# Patient Record
Sex: FEMALE | Race: WHITE | NOT HISPANIC OR LATINO | Employment: PART TIME | URBAN - METROPOLITAN AREA
[De-identification: names, ages, dates, MRNs, and addresses within clinical notes are randomized per-mention and may not be internally consistent; named-entity substitution may affect disease eponyms.]

---

## 2017-02-23 ENCOUNTER — OFFICE VISIT (OUTPATIENT)
Dept: FAMILY MEDICINE | Facility: CLINIC | Age: 32
End: 2017-02-23
Payer: COMMERCIAL

## 2017-02-23 VITALS
HEIGHT: 63 IN | BODY MASS INDEX: 29.02 KG/M2 | HEART RATE: 98 BPM | WEIGHT: 163.8 LBS | SYSTOLIC BLOOD PRESSURE: 100 MMHG | DIASTOLIC BLOOD PRESSURE: 60 MMHG | TEMPERATURE: 98.1 F | OXYGEN SATURATION: 98 %

## 2017-02-23 DIAGNOSIS — Z30.42 DEPO-PROVERA CONTRACEPTIVE STATUS: Primary | ICD-10-CM

## 2017-02-23 PROCEDURE — 96372 THER/PROPH/DIAG INJ SC/IM: CPT | Performed by: NURSE PRACTITIONER

## 2017-02-23 PROCEDURE — 99202 OFFICE O/P NEW SF 15 MIN: CPT | Mod: 25 | Performed by: NURSE PRACTITIONER

## 2017-02-23 NOTE — NURSING NOTE
"Chief Complaint   Patient presents with     Imm/Inj     Depo        Initial /60 (BP Location: Right arm, Patient Position: Chair, Cuff Size: Adult Regular)  Pulse 98  Temp 98.1  F (36.7  C) (Oral)  Ht 5' 2.5\" (1.588 m)  Wt 163 lb 12.8 oz (74.3 kg)  SpO2 98%  Breastfeeding? No  BMI 29.48 kg/m2 Estimated body mass index is 29.48 kg/(m^2) as calculated from the following:    Height as of this encounter: 5' 2.5\" (1.588 m).    Weight as of this encounter: 163 lb 12.8 oz (74.3 kg).  Medication Reconciliation: complete   KENAN Arvizu      "

## 2017-02-23 NOTE — MR AVS SNAPSHOT
"              After Visit Summary   2017    Mary Lerma    MRN: [id number]           Patient Information     Date Of Birth          1985        Visit Information        Provider Department      2017 11:00 AM Elena Mcdonough NP [facility]        Today's Diagnoses     Depo-Provera contraceptive status    -  1       Follow-ups after your visit        Who to contact     If you have questions or need follow up information about today's clinic visit or your schedule please contact [facility] directly at [phone].  Normal or non-critical lab and imaging results will be communicated to you by PingMDhart, letter or phone within 4 business days after the clinic has received the results. If you do not hear from us within 7 days, please contact the clinic through ViaViewt or phone. If you have a critical or abnormal lab result, we will notify you by phone as soon as possible.  Submit refill requests through [employer] or call your pharmacy and they will forward the refill request to us. Please allow 3 business days for your refill to be completed.          Additional Information About Your Visit        MyChart Information     [employer] lets you send messages to your doctor, view your test results, renew your prescriptions, schedule appointments and more. To sign up, go to [email or web address] . Click on \"Log in\" on the left side of the screen, which will take you to the Welcome page. Then click on \"Sign up Now\" on the right side of the page.     You will be asked to enter the access code listed below, as well as some personal information. Please follow the directions to create your username and password.     Your access code is: [email or web address]  Expires: 2017  5:22 PM     Your access code will  in 90 days. If you need help or a new code, please call your [facility] or [phone].        Care EveryWhere ID     This is your Care EveryWhere ID. This could be used by " "other organizations to access your [facility] medical records  [id number]        Your Vitals Were     Pulse Temperature Height Pulse Oximetry Breastfeeding? BMI (Body Mass Index)    98 98.1  F (36.7  C) (Oral) 5' 2.5\" (1.588 m) 98% No 29.48 kg/m2       Blood Pressure from Last 3 Encounters:   02/23/17 100/60    Weight from Last 3 Encounters:   02/23/17 163 lb 12.8 oz (74.3 kg)              We Performed the Following     INJECTION INTRAMUSCULAR OR SUB-Q     Medroxyprogesterone inj  1mg   (Depo Provera J-Code)        Primary Care Provider Office Phone # Fax #    Elena Mcdonough [phone]       [facility] [address]  [facility] [address]        Thank you!     Thank you for choosing [facility]  for your care. Our goal is always to provide you with excellent care. Hearing back from our patients is one way we can continue to improve our services. Please take a few minutes to complete the written survey that you may receive in the mail after your visit with us. Thank you!             Your Updated Medication List - Protect others around you: Learn how to safely use, store and throw away your medicines at www.disposemymeds.org.          This list is accurate as of: 2/23/17  5:22 PM.  Always use your most recent med list.                   Brand Name Dispense Instructions for use    MEDROXYPROGESTERONE ACETATE PO            "

## 2017-02-23 NOTE — PROGRESS NOTES
"  SUBJECTIVE:                                                    Mary Lerma is a 31 year old female who presents to clinic today for the following health issues:      New Patient/Transfer of Care  Mary recently moved here from [place], [place] with her  and two children. 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 Has been on Depo for the past three years since the birth of her second child. Feels like it is a good fit for her as OCP she would forget to take and does not want \"anything inside of her\". Last injection was on December 7th, 2016. Due for another injection and would like to establish care at this clinic.            Problem list and histories reviewed & adjusted, as indicated.  Additional history: none    Problem list, Medication list, Allergies, and Medical/Social/Surgical histories reviewed in EPIC and updated as appropriate.    ROS:  Constitutional, HEENT, cardiovascular, pulmonary, gi and gu systems are negative, except as otherwise noted.    OBJECTIVE:                                                    /60 (BP Location: Right arm, Patient Position: Chair, Cuff Size: Adult Regular)  Pulse 98  Temp 98.1  F (36.7  C) (Oral)  Ht 5' 2.5\" (1.588 m)  Wt 163 lb 12.8 oz (74.3 kg)  SpO2 98%  Breastfeeding? No  BMI 29.48 kg/m2  Body mass index is 29.48 kg/(m^2).  GENERAL: healthy, alert and no distress  RESP: lungs clear to auscultation - no rales, rhonchi or wheezes  CV: regular rate and rhythm, normal S1 S2, no S3 or S4, no murmur, click or rub, no peripheral edema and peripheral pulses strong  PSYCH: mentation appears normal, affect normal/bright    none      ASSESSMENT/PLAN:                                                            1. Depo-Provera contraceptive status  Due for Depo and is within her window so given today.   - Medroxyprogesterone inj  1mg   (Depo Provera J-Code)  - INJECTION INTRAMUSCULAR OR SUB-Q    Recommended that she follow up for a complete physical in the near " future. I have explained that the clinic can provide excellent care for her entire family. She will schedule her children as well.     Elena Mcdonough NP  [facility]

## 2017-05-03 ENCOUNTER — TELEPHONE (OUTPATIENT)
Dept: FAMILY MEDICINE | Facility: CLINIC | Age: 32
End: 2017-05-03

## 2017-05-03 NOTE — TELEPHONE ENCOUNTER
Panel Management Review      Patient has the following on her problem list: None      Composite cancer screening  Chart review shows that this patient is due/due soon for the following Pap Smear  Summary:    Patient is due/failing the following:   PAP    Action needed:   Patient needs office visit for pap.    Type of outreach:    Phone, spoke to patient.  pt has a few appt. at other clinics and she wants to see how those go before she makes another appointment with us.    Questions for provider review:    None                                                                                                                                    KENAN Arvizu       Chart routed to  .

## 2017-05-04 ENCOUNTER — PRE VISIT (OUTPATIENT)
Dept: OTOLARYNGOLOGY | Facility: CLINIC | Age: 32
End: 2017-05-04

## 2017-05-04 NOTE — TELEPHONE ENCOUNTER
1.  Date/reason for appt: 6/26/17 11:30AM Bilateral Hearing Loss  2.  Referring provider: Self   3.  Call to patient (Yes / No - short description): Yes, call was connected from CC  4.  Previous care at / records requested from:  [facility] E[facility] - Emailed MARGUERITE to [email or web address]

## 2017-05-07 ENCOUNTER — APPOINTMENT (OUTPATIENT)
Dept: GENERAL RADIOLOGY | Facility: CLINIC | Age: 32
End: 2017-05-07
Attending: EMERGENCY MEDICINE
Payer: OTHER MISCELLANEOUS

## 2017-05-07 ENCOUNTER — HOSPITAL ENCOUNTER (EMERGENCY)
Facility: CLINIC | Age: 32
Discharge: HOME | End: 2017-05-07
Attending: EMERGENCY MEDICINE | Admitting: EMERGENCY MEDICINE
Payer: OTHER MISCELLANEOUS

## 2017-05-07 VITALS
DIASTOLIC BLOOD PRESSURE: 92 MMHG | BODY MASS INDEX: 28.8 KG/M2 | TEMPERATURE: 98.8 F | OXYGEN SATURATION: 100 % | SYSTOLIC BLOOD PRESSURE: 153 MMHG | HEART RATE: 96 BPM | RESPIRATION RATE: 16 BRPM | WEIGHT: 160 LBS

## 2017-05-07 DIAGNOSIS — S60.221A CONTUSION OF RIGHT HAND, INITIAL ENCOUNTER: ICD-10-CM

## 2017-05-07 PROCEDURE — 99283 EMERGENCY DEPT VISIT LOW MDM: CPT

## 2017-05-07 PROCEDURE — 73130 X-RAY EXAM OF HAND: CPT | Mod: RT

## 2017-05-07 NOTE — ED AVS SNAPSHOT
[facility] Emergency Department    [address]    [facility] [address]    Phone:  [phone]    Fax:  [phone]                                       Mary Lerma   MRN: [id number]    Department:  [facility] Emergency Department   Date of Visit:  5/7/2017           Patient Information     Date Of Birth          1985        Your diagnoses for this visit were:     Contusion of right hand, initial encounter        You were seen by Charly Thomas MD.      Follow-up Information     Follow up with Elena Mcdonough NP. Schedule an appointment as soon as possible for a visit in 1 week.    Specialty:  Nurse Practitioner - Family    Why:  As needed    Contact information:    [facility]  [address]  [facility] [address]  [phone]          Discharge Instructions         Hand Contusion  You have a contusion. This is also called a bruise. There is swelling and some bleeding under the skin, but no broken bones. This injury generally takes a few days to a few weeks to heal.  During that time, the bruise will typically change in color from reddish, to purple-blue, to greenish-yellow, then to yellow-brown.  Home care    Elevate the hand to reduce pain and swelling. As much as possible, sit or lie down with the hand raised about the level of your heart. This is especially important during the first 48 hours.    Ice the hand to help reduce pain and swelling. Wrap a cold source (ice pack or ice cubes in a plastic bag) in a thin towel. Apply to the bruised area for 20 minutes every 1 to 2 hours the first day. Continue this 3 to 4 times a day until the pain and swelling goes away.    Unless another medication was prescribed, you can take acetaminophen, ibuprofen, or naproxen to control pain. (If you have chronic liver or kidney disease or ever had a stomach ulcer or GI bleeding, talk with your doctor before using these medicines.)  Follow up  Follow up with  your health care provider or our staff as advised. Call if you are not improving within 1 to 2 weeks.  When to seek medical advice   Call your health care provider right away if you have any of the following:    Increased pain or swelling    Arm becomes cold, blue, numb or tingly    Signs of infection: Warmth, drainage, or increased redness or pain around the bruise    Inability to move the injured hand     Frequent bruising for unknown reasons    6221-8123 [employer]. [address] [place] [address]. All rights reserved. This information is not intended as a substitute for professional medical care. Always follow your healthcare professional's instructions.          Future Appointments        Provider Department Dept Phone Center    6/26/2017 10:30 AM Kate Valverde [facility] Audiology [phone] [facility]    6/26/2017 11:30 AM MD KARL Espinosa [facility] Ear Nose and Throat [phone] [facility]      24 Hour Appointment Hotline       To make an appointment at any [facility] call [phone] ([phone]). If you don't have a family doctor or clinic, we will help you find one. [facility] are conveniently located to serve the needs of you and your family.             Review of your medicines      Our records show that you are taking the medicines listed below. If these are incorrect, please call your family doctor or clinic.        Dose / Directions Last dose taken    MEDROXYPROGESTERONE ACETATE PO        Refills:  0                Procedures and tests performed during your visit     Hand XR, G/E 3 views, right      Orders Needing Specimen Collection     None      Pending Results     No orders found from 5/5/2017 to 5/8/2017.            Pending Culture Results     No orders found from 5/5/2017 to 5/8/2017.            Pending Results Instructions     If you had any lab results that were not finalized at the time of your Discharge, you can call the ED Lab Result RN at  [phone]. You will be contacted by this team for any positive Lab results or changes in treatment. The nurses are available 7 days a week from 10A to 6:30P.  You can leave a message 24 hours per day and they will return your call.        Test Results From Your Hospital Stay        5/7/2017 10:14 PM      Narrative     HAND THREE  VIEWS RIGHT 5/7/2017 9:49 PM     HISTORY: Blunt trauma to 3rd metacarpal.    COMPARISON: None.    FINDINGS: There is no significant degenerative change. There is no  acute fracture or dislocation. There are no worrisome bony lesions.        Impression     IMPRESSION:  No acute osseous abnormality demonstrated.    OLIVIA AWAN MD                Clinical Quality Measure: Blood Pressure Screening     Your blood pressure was checked while you were in the emergency department today. The last reading we obtained was  BP: (!) 153/92 . Please read the guidelines below about what these numbers mean and what you should do about them.  If your systolic blood pressure (the top number) is less than 120 and your diastolic blood pressure (the bottom number) is less than 80, then your blood pressure is normal. There is nothing more that you need to do about it.  If your systolic blood pressure (the top number) is 120-139 or your diastolic blood pressure (the bottom number) is 80-89, your blood pressure may be higher than it should be. You should have your blood pressure rechecked within a year by a primary care provider.  If your systolic blood pressure (the top number) is 140 or greater or your diastolic blood pressure (the bottom number) is 90 or greater, you may have high blood pressure. High blood pressure is treatable, but if left untreated over time it can put you at risk for heart attack, stroke, or kidney failure. You should have your blood pressure rechecked by a primary care provider within the next 4 weeks.  If your provider in the emergency department today gave you specific instructions to  "follow-up with your doctor or provider even sooner than that, you should follow that instruction and not wait for up to 4 weeks for your follow-up visit.        Thank you for choosing [facility]       Thank you for choosing [facility] for your care. Our goal is always to provide you with excellent care. Hearing back from our patients is one way we can continue to improve our services. Please take a few minutes to complete the written survey that you may receive in the mail after you visit with us. Thank you!        Cartilixhart Information     [employer] lets you send messages to your doctor, view your test results, renew your prescriptions, schedule appointments and more. To sign up, go to [email or web address] . Click on \"Log in\" on the left side of the screen, which will take you to the Welcome page. Then click on \"Sign up Now\" on the right side of the page.     You will be asked to enter the access code listed below, as well as some personal information. Please follow the directions to create your username and password.     Your access code is: [email or web address]  Expires: 2017  6:22 PM     Your access code will  in 90 days. If you need help or a new code, please call your [facility] clinic or [phone].        Care EveryWhere ID     This is your Care EveryWhere ID. This could be used by other organizations to access your [facility] medical records  [id number]        After Visit Summary       This is your record. Keep this with you and show to your community pharmacist(s) and doctor(s) at your next visit.                  "

## 2017-05-07 NOTE — ED AVS SNAPSHOT
[facility] Emergency Department    [address]    [facility] [address]    Phone:  [phone]    Fax:  [phone]                                       Mary Lerma   MRN: [id number]    Department:  [facility] Emergency Department   Date of Visit:  5/7/2017           After Visit Summary Signature Page     I have received my discharge instructions, and my questions have been answered. I have discussed any challenges I see with this plan with the nurse or doctor.    ..........................................................................................................................................  Patient/Patient Representative Signature      ..........................................................................................................................................  Patient Representative Print Name and Relationship to Patient    ..................................................               ................................................  Date                                            Time    ..........................................................................................................................................  Reviewed by Signature/Title    ...................................................              ..............................................  Date                                                            Time

## 2017-05-08 NOTE — ED PROVIDER NOTES
CHIEF COMPLAINT:  A 31-year-old female with right hand pain.      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  She was transferring a patient when she struck her hand between another cart.  She had immediate onset of pain over the dorsal aspect of the third metacarpal.  The pain is constant, nonradiating and relatively mild at this time.  She denies any other traumatic injury.  No associated numbness or weakness.      ALLERGIES:  Strattera.       HOME MEDICATIONS:  Medroxyprogesterone acetate.      PAST MEDICAL HISTORY:  None.      SOCIAL HISTORY:  The patient is a former tobacco user.      REVIEW OF SYSTEMS:    MUSCULOSKELETAL:  Positive for right hand pain.   NEUROLOGIC:  Negative for numbness or weakness.   SKIN:  No new rash.      PHYSICAL EXAMINATION:   VITAL SIGNS:  Blood pressure 153/92, pulse 96, respirations 16, O2 saturation 100% on room air, temperature 98.8.     General:   Pleasant, age appropriate.  EYES:   Conjunctiva normal.  NECK:    Supple, no meningismus.   CV:     2+ radial pulses bilateral.  MSK:     Right upper extremity:      No pain with palpation or ROM of the wrist.      No scaphoid tenderness.      Tenderness to proximal 3rd metacarpal       Mild overlying edema.       No overlying laceration or tenting of the skin.  NEURO:   Right upper extremity:      Median, radial and ulnar nerve intact to motor and sensation.  SKIN:    Warm, dry and intact.    PSYCH:    Mood is good and affect is appropriate.         RADIOGRAPHIC STUDIES:  X-ray right hand, no acute osseous abnormality demonstrated.      MEDICAL DECISION MAKING:  A 31-year-old female seen in the ED with acute traumatic right hand pain.  Her pain is isolated to the third metacarpal with no tenderness over the scaphoid.  Plain films are unremarkable.  Findings are consistent with soft tissue contusion.  The patient was given a cock-up wrist  splint for comfort.  Ibuprofen and Tylenol as needed for pain.      DIAGNOSIS:  Right hand contusion.         EASTON ALICIA MD             D: 2017 23:28   T: 2017 11:12   MT: [id number]      Name:     CHELA WOMACK   MRN:      [id number]        Account:      [id number]   :      1985           Visit Date:   2017      Document: [id number]

## 2017-05-08 NOTE — DISCHARGE INSTRUCTIONS
Hand Contusion  You have a contusion. This is also called a bruise. There is swelling and some bleeding under the skin, but no broken bones. This injury generally takes a few days to a few weeks to heal.  During that time, the bruise will typically change in color from reddish, to purple-blue, to greenish-yellow, then to yellow-brown.  Home care    Elevate the hand to reduce pain and swelling. As much as possible, sit or lie down with the hand raised about the level of your heart. This is especially important during the first 48 hours.    Ice the hand to help reduce pain and swelling. Wrap a cold source (ice pack or ice cubes in a plastic bag) in a thin towel. Apply to the bruised area for 20 minutes every 1 to 2 hours the first day. Continue this 3 to 4 times a day until the pain and swelling goes away.    Unless another medication was prescribed, you can take acetaminophen, ibuprofen, or naproxen to control pain. (If you have chronic liver or kidney disease or ever had a stomach ulcer or GI bleeding, talk with your doctor before using these medicines.)  Follow up  Follow up with your health care provider or our staff as advised. Call if you are not improving within 1 to 2 weeks.  When to seek medical advice   Call your health care provider right away if you have any of the following:    Increased pain or swelling    Arm becomes cold, blue, numb or tingly    Signs of infection: Warmth, drainage, or increased redness or pain around the bruise    Inability to move the injured hand     Frequent bruising for unknown reasons    5663-9845 [employer]. [address] [place] [address], [place] [address]. All rights reserved. This information is not intended as a substitute for professional medical care. Always follow your healthcare professional's instructions.

## 2017-05-08 NOTE — ED NOTES
Pt injured right hand while at work, states hand was caught and smashed in metal linen cart, swelling noted to hand. ABC's intact, alert and oriented X3.

## 2017-05-11 ENCOUNTER — OFFICE VISIT (OUTPATIENT)
Dept: URGENT CARE | Facility: URGENT CARE | Age: 32
End: 2017-05-11
Payer: COMMERCIAL

## 2017-05-11 ENCOUNTER — RADIANT APPOINTMENT (OUTPATIENT)
Dept: GENERAL RADIOLOGY | Facility: CLINIC | Age: 32
End: 2017-05-11
Attending: FAMILY MEDICINE
Payer: OTHER MISCELLANEOUS

## 2017-05-11 VITALS
DIASTOLIC BLOOD PRESSURE: 60 MMHG | HEART RATE: 87 BPM | TEMPERATURE: 98.4 F | SYSTOLIC BLOOD PRESSURE: 110 MMHG | OXYGEN SATURATION: 100 %

## 2017-05-11 DIAGNOSIS — M54.6 PAIN IN THORACIC SPINE: ICD-10-CM

## 2017-05-11 DIAGNOSIS — M54.6 PAIN IN THORACIC SPINE: Primary | ICD-10-CM

## 2017-05-11 PROCEDURE — 99214 OFFICE O/P EST MOD 30 MIN: CPT | Performed by: FAMILY MEDICINE

## 2017-05-11 PROCEDURE — 72072 X-RAY EXAM THORAC SPINE 3VWS: CPT

## 2017-05-11 RX ORDER — CYCLOBENZAPRINE HCL 5 MG
5 TABLET ORAL 3 TIMES DAILY PRN
Qty: 30 TABLET | Refills: 0 | Status: SHIPPED | OUTPATIENT
Start: 2017-05-11 | End: 2017-07-06

## 2017-05-11 RX ORDER — TRAMADOL HYDROCHLORIDE 50 MG/1
50-100 TABLET ORAL EVERY 6 HOURS PRN
Qty: 12 TABLET | Refills: 0 | Status: SHIPPED | OUTPATIENT
Start: 2017-05-11 | End: 2017-05-26

## 2017-05-11 NOTE — NURSING NOTE
"Chief Complaint   Patient presents with     Urgent Care     Musculoskeletal Problem       Initial /60 (BP Location: Right arm, Patient Position: Chair, Cuff Size: Adult Regular)  Pulse 87  Temp 98.4  F (36.9  C) (Oral)  SpO2 100% Estimated body mass index is 28.8 kg/(m^2) as calculated from the following:    Height as of 2/23/17: 5' 2.5\" (1.588 m).    Weight as of 5/7/17: 160 lb (72.6 kg).  Medication Reconciliation: complete     Diana Del Castillo CMA (AAMA)        "

## 2017-05-11 NOTE — PROGRESS NOTES
SUBJECTIVE:                                                    Mary Lerma is a 31 year old female who presents to clinic today for the following health issues:      Back Pain      Duration: x1 day        Specific cause: fall, slipped on dog leash and fell down stairs last night     Description:   Location of pain: low back left and upper back middle area  Character of pain: sharp and constant  Pain radiation:none  New numbness or weakness in legs, not attributed to pain:  no     Intensity: Currently 9/10, worst when moving and breathing    History:   Pain interferes with job: YES  History of back problems: recurrent self limited episodes of low back pain in the past  Any previous MRI or X-rays: None  Sees a specialist for back pain:  No  Therapies tried without relief: Heat and Ice, tylenol     Alleviating factors:   Improved by: nothing helps      Precipitating factors:  Worsened by: activities and breathing    Functional and Psychosocial Screen (Dinorah STarT Back):      Not performed today       Accompanying Signs & Symptoms:  Risk of Fracture:  Recent history of trauma or blunt force  Risk of Cauda Equina:  None  Risk of Infection:  None  Risk of Cancer:  Unknown   Risk of Ankylosing Spondylitis:  Onset at age <35, male, AND morning back stiffness. no          ROS:  C: NEGATIVE for fever, chills, change in weight  INTEGUMENTARY/SKIN: NEGATIVE for worrisome rashes, moles or lesions and NEGATIVE for bruising on back   R: NEGATIVE for significant cough or SOB  CV: NEGATIVE for chest pain, palpitations or peripheral edema  MUSCULOSKELETAL: POSITIVE  for back pain thoracic, NEGATIVE for other muscle pain   NEURO: NEGATIVE for weakness, dizziness or paresthesias and NEGATIVE for dizziness/lightheadedness, loss of consciousness, numbness or tingling, visual change and bowel or bladder incontinence     OBJECTIVE:                                                    /60 (BP Location: Right arm, Patient Position:  Chair, Cuff Size: Adult Regular)  Pulse 87  Temp 98.4  F (36.9  C) (Oral)  SpO2 100%  There is no height or weight on file to calculate BMI.  GENERAL: healthy, alert and moderate distress  MS: decreased range of motion secondary to pain, no edema, tenderness to palpation over thoracic spine  SKIN: no suspicious lesions or rashes  NEURO: Normal strength and tone, mentation intact and sensation grossly intact     Diagnostic Test Results:  Xray - negative      ASSESSMENT/PLAN:                                                          ICD-10-CM    1. Pain in thoracic spine M54.6 XR Thoracic Spine 3 Views     cyclobenzaprine (FLEXERIL) 5 MG tablet     traMADol (ULTRAM) 50 MG tablet       Plan:   1) will treat muscle pain with above medications  2) follow up next week to check on improvement in symptoms     25 minutes Spent inexam and counseling. 50% of time in counseling in regards to back pain, selection of medication and follow-up    Luis Hardin MD  [facility]

## 2017-05-12 ENCOUNTER — TELEPHONE (OUTPATIENT)
Dept: NURSING | Facility: CLINIC | Age: 32
End: 2017-05-12

## 2017-05-13 NOTE — TELEPHONE ENCOUNTER
"Call Type: Triage Call    Presenting Problem: \"I was in for back injury, and my Tramadol isn't  working at all. Pain 8-9/10.\" Requesting stromger pain meds.  Triage Note:  Guideline Title: Back Symptoms ; Back Symptoms  Recommended Disposition: See Provider within 24 hours  Original Inclination: Wanted to speak with a nurse  Override Disposition: See ED Immediately  Intended Action: Patient does not know  Physician Contacted: No  Pain intensifies with coughing, sneezing or straining ?  YES  New or worsening signs and symptoms that may indicate shock ? NO  Pregnant,  less than 20 weeks gestation ? NO  Severe back pain AND history of IV drug use, alcoholism, or previous spinal trauma  ? NO  Pregnant and gestation greater than 37 weeks AND low backache/pain that is  constant or increasing in intensity ? NO  Pregnant and gestation 20-37 weeks AND low backache/pain that is constant or  increasing in intensity ? NO  Following significant trauma or injury to neck or back AND immobile since event ?  NO  Pregnant, back symptoms AND no signs of labor ? NO  Burning or tingling feeling, itchiness or stabbing pain in a localized area on one  side of body followed by reddened fluid-filled blisters that break and crust ? NO  Back pain associated with any breathing symptoms (such as noisy breathing,  struggling to breathe, sudden change in respiratory rate) ? NO  New paralysis (unable to move); new weakness (not due to pain); new loss of  coordination (purposeful action) OR new unexplained numbness/tingling involving  arm and leg on same side of body ? NO  Following significant trauma AND has been mobile since injury but is now having  back or neck pain ? NO  Age 50 years or older with sudden onset of deep boring or tearing pain in back OR  abdomen; may radiate to groin, hips or lower extremities ? NO  Pain predominately in flank area ? NO  Urinary tract symptoms are primary symptoms ? NO  Urinary tract symptoms are primary " symptoms ? NO  New onset back pain associated with numbness or weakness in both legs ? NO  Fever of 100.5 F (38.1 C) or higher associated with back symptoms ? NO  Low back pain AND urinary tract symptoms ? NO  Back pain radiates into thigh or below knee ? NO  New onset or unexplained change in bowel or bladder control (unable to urinate and  full feeling or loss of control of bowel or bladder) ? NO  Numbness in the groin and saddle area of pelvis AND lower extremity weakness OR  change in bowel or bladder control (loss of control, retention, overflow) ? NO  Painful spasms or cramping of large muscle groups (back, legs or abdomen) AND  recent heat exposure ? NO  Any other cardiac signs/symptoms for more than 5 minutes, now or within last hour.  Pain is NOT associated with taking a deep breath or a productive cough, movement,  or touch to a localized area on the chest or upper body. ? NO  Pain predominately in the neck ? NO  New onset of severe disabling back pain (unable to stand upright; pain wakens you  from sleep; constant or intense pain when lying down) ? NO  Any temperature elevation in an immunocompromised individual OR frail elderly with  signs of dehydration ? NO  Physician Instructions:  Care Advice: Call provider if symptoms worsen or new symptoms develop.  Go to the ED if you have worsening pain, numbness or weakness of arms or  legs, cannot walk, or have new unexplained changes in bladder or bowel  function. Another adult should drive.

## 2017-05-17 ENCOUNTER — OFFICE VISIT (OUTPATIENT)
Dept: FAMILY MEDICINE | Facility: CLINIC | Age: 32
End: 2017-05-17
Payer: COMMERCIAL

## 2017-05-17 VITALS
HEART RATE: 91 BPM | OXYGEN SATURATION: 98 % | TEMPERATURE: 99 F | BODY MASS INDEX: 29.48 KG/M2 | WEIGHT: 166.4 LBS | DIASTOLIC BLOOD PRESSURE: 60 MMHG | HEIGHT: 63 IN | SYSTOLIC BLOOD PRESSURE: 110 MMHG

## 2017-05-17 DIAGNOSIS — Z98.84 BARIATRIC SURGERY STATUS: ICD-10-CM

## 2017-05-17 DIAGNOSIS — R50.9 FEVER, UNSPECIFIED: Primary | ICD-10-CM

## 2017-05-17 DIAGNOSIS — Z30.013 ENCOUNTER FOR INITIAL PRESCRIPTION OF INJECTABLE CONTRACEPTIVE: ICD-10-CM

## 2017-05-17 DIAGNOSIS — M54.6 ACUTE THORACIC BACK PAIN, UNSPECIFIED BACK PAIN LATERALITY: ICD-10-CM

## 2017-05-17 DIAGNOSIS — Z12.4 SCREENING FOR MALIGNANT NEOPLASM OF CERVIX: ICD-10-CM

## 2017-05-17 DIAGNOSIS — Z23 NEED FOR PROPHYLACTIC VACCINATION WITH TETANUS-DIPHTHERIA (TD): ICD-10-CM

## 2017-05-17 DIAGNOSIS — Z00.00 ENCOUNTER FOR ROUTINE ADULT HEALTH EXAMINATION WITHOUT ABNORMAL FINDINGS: ICD-10-CM

## 2017-05-17 DIAGNOSIS — Z23 ENCOUNTER FOR IMMUNIZATION: ICD-10-CM

## 2017-05-17 PROBLEM — E66.3 OVERWEIGHT: Status: ACTIVE | Noted: 2017-05-17

## 2017-05-17 LAB
DEPRECATED S PYO AG THROAT QL EIA: NORMAL
ERYTHROCYTE [DISTWIDTH] IN BLOOD BY AUTOMATED COUNT: 13 % (ref 10–15)
HCT VFR BLD AUTO: 43.2 % (ref 35–47)
HGB BLD-MCNC: 14.6 G/DL (ref 11.7–15.7)
MCH RBC QN AUTO: 30.2 PG (ref 26.5–33)
MCHC RBC AUTO-ENTMCNC: 33.8 G/DL (ref 31.5–36.5)
MCV RBC AUTO: 89 FL (ref 78–100)
MICRO REPORT STATUS: NORMAL
PLATELET # BLD AUTO: 254 10E9/L (ref 150–450)
RBC # BLD AUTO: 4.83 10E12/L (ref 3.8–5.2)
SPECIMEN SOURCE: NORMAL
VIT B12 SERPL-MCNC: 427 PG/ML (ref 193–986)
WBC # BLD AUTO: 8.4 10E9/L (ref 4–11)

## 2017-05-17 PROCEDURE — 87081 CULTURE SCREEN ONLY: CPT | Performed by: NURSE PRACTITIONER

## 2017-05-17 PROCEDURE — 82728 ASSAY OF FERRITIN: CPT | Performed by: NURSE PRACTITIONER

## 2017-05-17 PROCEDURE — 82607 VITAMIN B-12: CPT | Performed by: NURSE PRACTITIONER

## 2017-05-17 PROCEDURE — 80053 COMPREHEN METABOLIC PANEL: CPT | Performed by: NURSE PRACTITIONER

## 2017-05-17 PROCEDURE — G0145 SCR C/V CYTO,THINLAYER,RESCR: HCPCS | Performed by: NURSE PRACTITIONER

## 2017-05-17 PROCEDURE — 80061 LIPID PANEL: CPT | Performed by: NURSE PRACTITIONER

## 2017-05-17 PROCEDURE — 99395 PREV VISIT EST AGE 18-39: CPT | Mod: 25 | Performed by: NURSE PRACTITIONER

## 2017-05-17 PROCEDURE — 87880 STREP A ASSAY W/OPTIC: CPT | Performed by: NURSE PRACTITIONER

## 2017-05-17 PROCEDURE — 85027 COMPLETE CBC AUTOMATED: CPT | Performed by: NURSE PRACTITIONER

## 2017-05-17 PROCEDURE — 90471 IMMUNIZATION ADMIN: CPT | Performed by: NURSE PRACTITIONER

## 2017-05-17 PROCEDURE — 96372 THER/PROPH/DIAG INJ SC/IM: CPT | Performed by: NURSE PRACTITIONER

## 2017-05-17 PROCEDURE — 90715 TDAP VACCINE 7 YRS/> IM: CPT | Performed by: NURSE PRACTITIONER

## 2017-05-17 PROCEDURE — 99213 OFFICE O/P EST LOW 20 MIN: CPT | Mod: 25 | Performed by: NURSE PRACTITIONER

## 2017-05-17 PROCEDURE — 36415 COLL VENOUS BLD VENIPUNCTURE: CPT | Performed by: NURSE PRACTITIONER

## 2017-05-17 RX ORDER — HYDROCODONE BITARTRATE AND ACETAMINOPHEN 5; 325 MG/1; MG/1
1 TABLET ORAL EVERY 4 HOURS PRN
Qty: 10 TABLET | Refills: 0 | Status: SHIPPED | OUTPATIENT
Start: 2017-05-17 | End: 2017-05-26

## 2017-05-17 RX ORDER — MEDROXYPROGESTERONE ACETATE 150 MG/ML
150 INJECTION, SUSPENSION INTRAMUSCULAR
Qty: 1 ML | Refills: 3 | OUTPATIENT
Start: 2017-05-17

## 2017-05-17 NOTE — NURSING NOTE
"Chief Complaint   Patient presents with     Physical       Initial /60 (BP Location: Right arm, Patient Position: Chair, Cuff Size: Adult Regular)  Pulse 91  Temp 99  F (37.2  C) (Oral)  Ht 5' 2.5\" (1.588 m)  Wt 166 lb 6.4 oz (75.5 kg)  SpO2 98%  Breastfeeding? No  BMI 29.95 kg/m2 Estimated body mass index is 29.95 kg/(m^2) as calculated from the following:    Height as of this encounter: 5' 2.5\" (1.588 m).    Weight as of this encounter: 166 lb 6.4 oz (75.5 kg).  Medication Reconciliation: complete   KENAN Arvizu      "

## 2017-05-17 NOTE — PROGRESS NOTES
SUBJECTIVE:     CC: Mary Lerma is an 31 year old woman who presents for preventive health visit.     Healthy Habits:    Do you get at least three servings of calcium containing foods daily (dairy, green leafy vegetables, etc.)? yes    Amount of exercise or daily activities, outside of work: walks 30,000 a day at work     Problems taking medications regularly No    Medication side effects: No    Have you had an eye exam in the past two years? no    Do you see a dentist twice per year? no  Do you have sleep apnea, excessive snoring or daytime drowsiness?no, but feels fatigued a lot       Patient is here for complete physical exam including a Pap.  with 2 children. Currently using Depo-Provera for birth control and feels it's a good fit based on her lifestyle. History includes gastric bypass surgery and is interested in having labs drawn today. Patient relocated from [place] and has been without health insurance for some time.    In addition, has concerns about possible strep throat. Sore throat for the past 2 days. Exposure at work since working at [employer]    Complaints of back pain after recent fall while at home. Patient was seen in the urgent care by Dr. Hamlin's are given Ultram and Flexeril. Patient is still concerned about level of muscular soreness. Patient does not feel that after one week she should be sore.      Today's PHQ-2 Score:   PHQ-2 ( 1999 Pfizer) 2/23/2017   Q1: Little interest or pleasure in doing things 0   Q2: Feeling down, depressed or hopeless 0   PHQ-2 Score 0       Abuse: Current or Past(Physical, Sexual or Emotional)- No  Do you feel safe in your environment - Yes    Social History   Substance Use Topics     Smoking status: Former Smoker     Types: Cigarettes     Quit date: 2/9/2017     Smokeless tobacco: Never Used     Alcohol use No     The patient does not drink >3 drinks per day nor >7 drinks per week.    No results for input(s): CHOL, HDL, LDL, TRIG,  "KRIS DOMINGUEZ in the last [id number] hours.    Reviewed orders with patient.  Reviewed health maintenance and updated orders accordingly - Yes    Mammo Decision Support:  Mammogram not appropriate for this patient based on age.    Pertinent mammograms are reviewed under the imaging tab.  History of abnormal Pap smear: NO - age 21-29 PAP every 3 years recommended    Reviewed and updated as needed this visit by clinical staff  Tobacco  Allergies  Meds  Problems  Med Hx  Surg Hx  Fam Hx  Soc Hx          Reviewed and updated as needed this visit by Provider  Allergies  Meds  Problems            ROS:  C: NEGATIVE for fever, chills, change in weight  I: NEGATIVE for worrisome rashes, moles or lesions  E: NEGATIVE for vision changes or irritation  ENT: NEGATIVE for ear, mouth and throat problems  R: NEGATIVE for significant cough or SOB  B: NEGATIVE for masses, tenderness or discharge  CV: NEGATIVE for chest pain, palpitations or peripheral edema  GI: NEGATIVE for nausea, abdominal pain, heartburn, or change in bowel habits  : NEGATIVE for unusual urinary or vaginal symptoms. Periods are regular.  M: NEGATIVE for significant arthralgias or myalgia  N: NEGATIVE for weakness, dizziness or paresthesias  P: NEGATIVE for changes in mood or affect    Problem list, Medication list, Allergies, and Medical/Social/Surgical histories reviewed in [facility] and updated as appropriate.  OBJECTIVE:     /60 (BP Location: Right arm, Patient Position: Chair, Cuff Size: Adult Regular)  Pulse 91  Temp 99  F (37.2  C) (Oral)  Ht 5' 2.5\" (1.588 m)  Wt 166 lb 6.4 oz (75.5 kg)  SpO2 98%  Breastfeeding? No  BMI 29.95 kg/m2  EXAM:  GENERAL: healthy, alert and over weight  EYES: Eyes grossly normal to inspection, PERRL and conjunctivae and sclerae normal  HENT: ear canals and TM's normal, nose and mouth without ulcers or lesions  NECK: no adenopathy, no asymmetry, masses, or scars and thyroid normal to palpation  RESP: lungs " clear to auscultation - no rales, rhonchi or wheezes  BREAST: normal without masses, tenderness or nipple discharge and no palpable axillary masses or adenopathy  CV: regular rate and rhythm, normal S1 S2, no S3 or S4, no murmur, click or rub, no peripheral edema and peripheral pulses strong  ABDOMEN: soft, nontender, no hepatosplenomegaly, no masses and bowel sounds normal   (male): normal male genitalia without lesions or urethral discharge, no hernia  SKIN: Multiple tattoos present,  PSYCH: mentation appears normal, affect normal/bright    ASSESSMENT/PLAN:     1. Encounter for routine adult health examination without abnormal findings  Clinical breast exam is within normal limits. Fasting labs drawn today.  - Comprehensive metabolic panel  - Lipid panel reflex to direct LDL    2. Screening for malignant neoplasm of cervix   Pap collected and Chlamydia swab collected since sexually active.   - Pap imaged thin layer screen reflex to HPV if ASCUS - recommend age 25 - 29    3. Need for prophylactic vaccination with tetanus-diphtheria (TD)   TDap given today.     4. Fever, unspecified   Rapid strep is negative. Encourage fluids and rest.   - Strep, Rapid Screen  - Beta strep group A culture    5. Bariatric surgery status   History of bariatric surgery including gastric bypass. No recent labs drawn. CBC, ferritin and vitamin B12 levels collected today.  - Lipid panel reflex to direct LDL  - CBC with platelets  - Ferritin  - Vitamin B12    6. Encounter for initial prescription of injectable contraceptive   Depo-Provera injection given today and ordered for 1 year  - medroxyPROGESTERone (DEPO-PROVERA) 150 MG/ML injection; Inject 1 mL (150 mg) into the muscle every 3 months  Dispense: 1 mL; Refill: 3    7. Acute thoracic back pain, unspecified back pain laterality   Reviewed with patient treatment for ongoing back pain including ibuprofen. Offered tramadol patient declined. Emphasis on good body mechanics, weight loss  "and regular exercise routine to prevent further complications. No refills on narcotics.   - HYDROcodone-acetaminophen (NORCO) 5-325 MG per tablet; Take 1 tablet by mouth every 4 hours as needed for pain maximum 2 tablet(s) per day  Dispense: 10 tablet; Refill: 0    COUNSELING:   Reviewed preventive health counseling, as reflected in patient instructions       Regular exercise       Healthy diet/nutrition       Contraception         reports that she quit smoking about 3 months ago. Her smoking use included Cigarettes. She has never used smokeless tobacco.    Estimated body mass index is 29.95 kg/(m^2) as calculated from the following:    Height as of this encounter: 5' 2.5\" (1.588 m).    Weight as of this encounter: 166 lb 6.4 oz (75.5 kg).   Weight management plan: Discussed healthy diet and exercise guidelines and patient will follow up in 12 months in clinic to re-evaluate.    Counseling Resources:  ATP IV Guidelines  Pooled Cohorts Equation Calculator  Breast Cancer Risk Calculator  FRAX Risk Assessment  ICSI Preventive Guidelines  Dietary Guidelines for Americans, 2010  USDA's MyPlate  ASA Prophylaxis  Lung CA Screening    Elena Mcdonough NP  [facility]  "

## 2017-05-17 NOTE — MR AVS SNAPSHOT
After Visit Summary   5/17/2017    Mary Lerma    MRN: [id number]           Patient Information     Date Of Birth          1985        Visit Information        Provider Department      5/17/2017 11:00 AM Elena Mcdonough NP [facility]        Today's Diagnoses     Fever, unspecified    -  1    Screening for malignant neoplasm of cervix        Need for prophylactic vaccination with tetanus-diphtheria (TD)        Encounter for routine adult health examination without abnormal findings        Bariatric surgery status        Encounter for initial prescription of injectable contraceptive        Acute thoracic back pain, unspecified back pain laterality          Care Instructions      Preventive Health Recommendations  Female Ages 26 - 39  Yearly exam:   See your health care provider every year in order to    Review health changes.     Discuss preventive care.      Review your medicines if you your doctor has prescribed any.    Until age 30: Get a Pap test every three years (more often if you have had an abnormal result).    After age 30: Talk to your doctor about whether you should have a Pap test every 3 years or have a Pap test with HPV screening every 5 years.   You do not need a Pap test if your uterus was removed (hysterectomy) and you have not had cancer.  You should be tested each year for STDs (sexually transmitted diseases), if you're at risk.   Talk to your provider about how often to have your cholesterol checked.  If you are at risk for diabetes, you should have a diabetes test (fasting glucose).  Shots: Get a flu shot each year. Get a tetanus shot every 10 years.   Nutrition:     Eat at least 5 servings of fruits and vegetables each day.    Eat whole-grain bread, whole-wheat pasta and brown rice instead of white grains and rice.    Talk to your provider about Calcium and Vitamin D.     Lifestyle    Exercise at least 150 minutes a week (30 minutes a day, 5 days of the  "week). This will help you control your weight and prevent disease.    Limit alcohol to one drink per day.    No smoking.     Wear sunscreen to prevent skin cancer.    See your dentist every six months for an exam and cleaning.          Follow-ups after your visit        Your next 10 appointments already scheduled     Jun 26, 2017 10:30 AM CDT   Walk In From ENT with Kate Mitchell [facility] ([facility])    [address] [place] [id number] [phone]            Jun 26, 2017 11:30 AM CDT   (Arrive by 11:15 AM)   New Patient Visit with MD KARL Kaplan [facility]ose [facility] ([facility])    [address] [place] [id number] [phone]              Who to contact     If you have questions or need follow up information about today's clinic visit or your schedule please contact [facility] directly at [phone].  Normal or non-critical lab and imaging results will be communicated to you by [employer], letter or phone within 4 business days after the clinic has received the results. If you do not hear from us within 7 days, please contact the clinic through [employer]t or phone. If you have a critical or abnormal lab result, we will notify you by phone as soon as possible.  Submit refill requests through Testt or call your pharmacy and they will forward the refill request to us. Please allow 3 business days for your refill to be completed.          Additional Information About Your Visit        Testt Information     Testt lets you send messages to your doctor, view your test results, renew your prescriptions, schedule appointments and more. To sign up, go to [email or web address] . Click on \"Log in\" on the left side of the screen, which will take you to the Welcome page. Then click on \"Sign up Now\" on the right side of the page.     You will be asked to enter the access " "code listed below, as well as some personal information. Please follow the directions to create your username and password.     Your access code is: [email or web address]  Expires: 2017  6:22 PM     Your access code will  in 90 days. If you need help or a new code, please call your [facility] clinic or [phone].        Care EveryWhere ID     This is your Care EveryWhere ID. This could be used by other organizations to access your [facility] medical records  [id number]        Your Vitals Were     Pulse Temperature Height Pulse Oximetry Breastfeeding? BMI (Body Mass Index)    91 99  F (37.2  C) (Oral) 5' 2.5\" (1.588 m) 98% No 29.95 kg/m2       Blood Pressure from Last 3 Encounters:   17 110/60   17 110/60   17 (!) 153/92    Weight from Last 3 Encounters:   17 166 lb 6.4 oz (75.5 kg)   17 160 lb (72.6 kg)   17 163 lb 12.8 oz (74.3 kg)              We Performed the Following     Beta strep group A culture     CBC with platelets     Comprehensive metabolic panel     Ferritin     Lipid panel reflex to direct LDL     Pap imaged thin layer screen reflex to HPV if ASCUS - recommend age 25 - 29     Strep, Rapid Screen     Vitamin B12          Today's Medication Changes          These changes are accurate as of: 17 11:43 AM.  If you have any questions, ask your nurse or doctor.               Start taking these medicines.        Dose/Directions    HYDROcodone-acetaminophen 5-325 MG per tablet   Commonly known as:  NORCO   Used for:  Acute thoracic back pain, unspecified back pain laterality   Started by:  Elena Mcdonough NP        Dose:  1 tablet   Take 1 tablet by mouth every 4 hours as needed for pain maximum 2 tablet(s) per day   Quantity:  10 tablet   Refills:  0       medroxyPROGESTERone 150 MG/ML injection   Commonly known as:  DEPO-PROVERA   Used for:  Encounter for initial prescription of injectable contraceptive   Started by:  Elena Mcdonough NP        Dose:  150 mg "   Inject 1 mL (150 mg) into the muscle every 3 months   Quantity:  1 mL   Refills:  3            Where to get your medicines      Some of these will need a paper prescription and others can be bought over the counter.  Ask your nurse if you have questions.     Bring a paper prescription for each of these medications     HYDROcodone-acetaminophen 5-325 MG per tablet       You don't need a prescription for these medications     medroxyPROGESTERone 150 MG/ML injection                Primary Care Provider Office Phone # Fax #    Elena Mcdonough [phone]       [facility] [address] [address] [address]        Thank you!     Thank you for choosing [facility]  for your care. Our goal is always to provide you with excellent care. Hearing back from our patients is one way we can continue to improve our services. Please take a few minutes to complete the written survey that you may receive in the mail after your visit with us. Thank you!             Your Updated Medication List - Protect others around you: Learn how to safely use, store and throw away your medicines at www.disposemymeds.org.          This list is accurate as of: 5/17/17 11:43 AM.  Always use your most recent med list.                   Brand Name Dispense Instructions for use    cyclobenzaprine 5 MG tablet    FLEXERIL    30 tablet    Take 1 tablet (5 mg) by mouth 3 times daily as needed for muscle spasms       HYDROcodone-acetaminophen 5-325 MG per tablet    NORCO    10 tablet    Take 1 tablet by mouth every 4 hours as needed for pain maximum 2 tablet(s) per day       medroxyPROGESTERone 150 MG/ML injection    DEPO-PROVERA    1 mL    Inject 1 mL (150 mg) into the muscle every 3 months       MEDROXYPROGESTERONE ACETATE PO          traMADol 50 MG tablet    ULTRAM    12 tablet    Take 1-2 tablets ( mg) by mouth every 6 hours as needed for pain maximum 3 tablet(s) per day

## 2017-05-18 LAB
ALBUMIN SERPL-MCNC: 3.8 G/DL (ref 3.4–5)
ALP SERPL-CCNC: 66 U/L (ref 40–150)
ALT SERPL W P-5'-P-CCNC: 18 U/L (ref 0–50)
ANION GAP SERPL CALCULATED.3IONS-SCNC: 7 MMOL/L (ref 3–14)
AST SERPL W P-5'-P-CCNC: 14 U/L (ref 0–45)
BACTERIA SPEC CULT: NORMAL
BILIRUB SERPL-MCNC: 0.2 MG/DL (ref 0.2–1.3)
BUN SERPL-MCNC: 12 MG/DL (ref 7–30)
CALCIUM SERPL-MCNC: 8.7 MG/DL (ref 8.5–10.1)
CHLORIDE SERPL-SCNC: 110 MMOL/L (ref 94–109)
CHOLEST SERPL-MCNC: 130 MG/DL
CO2 SERPL-SCNC: 26 MMOL/L (ref 20–32)
CREAT SERPL-MCNC: 0.6 MG/DL (ref 0.52–1.04)
FERRITIN SERPL-MCNC: 57 NG/ML (ref 12–150)
GFR SERPL CREATININE-BSD FRML MDRD: ABNORMAL ML/MIN/1.7M2
GLUCOSE SERPL-MCNC: 125 MG/DL (ref 70–99)
HDLC SERPL-MCNC: 34 MG/DL
LDLC SERPL CALC-MCNC: 76 MG/DL
MICRO REPORT STATUS: NORMAL
NONHDLC SERPL-MCNC: 96 MG/DL
POTASSIUM SERPL-SCNC: 4.2 MMOL/L (ref 3.4–5.3)
PROT SERPL-MCNC: 7.1 G/DL (ref 6.8–8.8)
SODIUM SERPL-SCNC: 143 MMOL/L (ref 133–144)
SPECIMEN SOURCE: NORMAL
TRIGL SERPL-MCNC: 100 MG/DL

## 2017-05-19 LAB
COPATH REPORT: NORMAL
PAP: NORMAL

## 2017-05-26 ENCOUNTER — OFFICE VISIT (OUTPATIENT)
Dept: FAMILY MEDICINE | Facility: CLINIC | Age: 32
End: 2017-05-26
Payer: COMMERCIAL

## 2017-05-26 VITALS
RESPIRATION RATE: 16 BRPM | BODY MASS INDEX: 29.41 KG/M2 | HEIGHT: 63 IN | HEART RATE: 87 BPM | OXYGEN SATURATION: 98 % | WEIGHT: 166 LBS | DIASTOLIC BLOOD PRESSURE: 66 MMHG | TEMPERATURE: 98.7 F | SYSTOLIC BLOOD PRESSURE: 118 MMHG

## 2017-05-26 DIAGNOSIS — M54.6 ACUTE THORACIC BACK PAIN, UNSPECIFIED BACK PAIN LATERALITY: ICD-10-CM

## 2017-05-26 PROCEDURE — 99213 OFFICE O/P EST LOW 20 MIN: CPT | Performed by: NURSE PRACTITIONER

## 2017-05-26 RX ORDER — HYDROCODONE BITARTRATE AND ACETAMINOPHEN 5; 325 MG/1; MG/1
1 TABLET ORAL EVERY 4 HOURS PRN
Qty: 15 TABLET | Refills: 0 | Status: SHIPPED | OUTPATIENT
Start: 2017-05-26 | End: 2017-06-26

## 2017-05-26 NOTE — MR AVS SNAPSHOT
After Visit Summary   5/26/2017    Mary Lerma    MRN: [id number]           Patient Information     Date Of Birth          1985        Visit Information        Provider Department      5/26/2017 8:45 AM Elena Mcdonough NP [facility]        Today's Diagnoses     Acute thoracic back pain, unspecified back pain laterality           Follow-ups after your visit        Additional Services     ORTHOPEDICS ADULT REFERRAL       Your provider has referred you to: FM[facility]: [facility]  [phone]   [email or web address]    Please be aware that coverage of these services is subject to the terms and limitations of your health insurance plan.  Call member services at your health plan with any benefit or coverage questions.      Please bring the following to your appointment:    >>   Any x-rays, CTs or MRIs which have been performed.  Contact the facility where they were done to arrange for  prior to your scheduled appointment.    >>   List of current medications   >>   This referral request   >>   Any documents/labs given to you for this referral                  Your next 10 appointments already scheduled     Jun 26, 2017 10:30 AM CDT   Walk In From ENT with Kate Mitchell   [facility] ([facility])    [address] [place] [id number] [phone]            Jun 26, 2017 11:30 AM CDT   (Arrive by 11:15 AM)   New Patient Visit with Jacques Lawson MD   [facility] ([facility])    [address] [place] [address] [phone]              Who to contact     If you have questions or need follow up information about today's clinic visit or your schedule please contact [facility] directly at [phone].  Normal  "or non-critical lab and imaging results will be communicated to you by MyChart, letter or phone within 4 business days after the clinic has received the results. If you do not hear from us within 7 days, please contact the clinic through Cumulocity or phone. If you have a critical or abnormal lab result, we will notify you by phone as soon as possible.  Submit refill requests through Cumulocity or call your pharmacy and they will forward the refill request to us. Please allow 3 business days for your refill to be completed.          Additional Information About Your Visit        CostPrizeharAppota Information     Cumulocity gives you secure access to your electronic health record. If you see a primary care provider, you can also send messages to your care team and make appointments. If you have questions, please call your primary care clinic.  If you do not have a primary care provider, please call [phone] and they will assist you.        Care EveryWhere ID     This is your Care EveryWhere ID. This could be used by other organizations to access your [place] medical records  [id number]        Your Vitals Were     Pulse Temperature Respirations Height Pulse Oximetry Breastfeeding?    87 98.7  F (37.1  C) (Oral) 16 5' 2.5\" (1.588 m) 98% No    BMI (Body Mass Index)                   29.88 kg/m2            Blood Pressure from Last 3 Encounters:   05/26/17 118/66   05/17/17 110/60   05/11/17 110/60    Weight from Last 3 Encounters:   05/26/17 166 lb (75.3 kg)   05/17/17 166 lb 6.4 oz (75.5 kg)   05/07/17 160 lb (72.6 kg)              We Performed the Following     ORTHOPEDICS ADULT REFERRAL          Where to get your medicines      Some of these will need a paper prescription and others can be bought over the counter.  Ask your nurse if you have questions.     Bring a paper prescription for each of these medications     HYDROcodone-acetaminophen 5-325 MG per tablet          Primary Care Provider Office Phone # Fax #    Elena BARAJAS " EDENILSON Mcdonough [phone]       [facility] [address]  [facility] [address]        Thank you!     Thank you for choosing [facility]  for your care. Our goal is always to provide you with excellent care. Hearing back from our patients is one way we can continue to improve our services. Please take a few minutes to complete the written survey that you may receive in the mail after your visit with us. Thank you!             Your Updated Medication List - Protect others around you: Learn how to safely use, store and throw away your medicines at www.disposemymeds.org.          This list is accurate as of: 5/26/17  9:17 AM.  Always use your most recent med list.                   Brand Name Dispense Instructions for use    cyclobenzaprine 5 MG tablet    FLEXERIL    30 tablet    Take 1 tablet (5 mg) by mouth 3 times daily as needed for muscle spasms       HYDROcodone-acetaminophen 5-325 MG per tablet    NORCO    15 tablet    Take 1 tablet by mouth every 4 hours as needed for pain maximum 2 tablet(s) per day       medroxyPROGESTERone 150 MG/ML injection    DEPO-PROVERA    1 mL    Inject 1 mL (150 mg) into the muscle every 3 months

## 2017-05-26 NOTE — NURSING NOTE
"Chief Complaint   Patient presents with     Back Pain       Initial /66 (BP Location: Right arm, Patient Position: Chair, Cuff Size: Adult Regular)  Pulse 87  Temp 98.7  F (37.1  C) (Oral)  Resp 16  Ht 5' 2.5\" (1.588 m)  Wt 166 lb (75.3 kg)  SpO2 98%  Breastfeeding? No  BMI 29.88 kg/m2 Estimated body mass index is 29.88 kg/(m^2) as calculated from the following:    Height as of this encounter: 5' 2.5\" (1.588 m).    Weight as of this encounter: 166 lb (75.3 kg).  Medication Reconciliation: complete     Wilber Hyatt CMA      "

## 2017-05-26 NOTE — PROGRESS NOTES
SUBJECTIVE:                                                    Mary Lerma is a 31 year old female who presents to clinic today for the following health issues:      Back Pain -tingling in toes and fingers     Duration: x ongoing for several weeks        Specific cause: fall     Description:   Location of pain: low and mid back pain  Character of pain: sharp  Pain radiation:none  New numbness or weakness in legs, not attributed to pain:  no     Intensity: Currently 5/10, moderate, varies    History:   Pain interferes with job: YES,   History of back problems: low back is from 10 years ago  Any previous MRI or X-rays: was done here at urgent care  Sees a specialist for back pain:  No  Therapies tried without relief: heat    Alleviating factors:   Improved by: heat      Precipitating factors:  Worsened by: Lifting, Bending, Standing, Sitting, Lying Flat and Walking     Accompanying Signs & Symptoms:  Risk of Fracture:  None  Risk of Cauda Equina:  None  Risk of Infection:  None  Risk of Cancer:  None  Risk of Ankylosing Spondylitis:  Onset at age <35, male, AND morning back stiffness. no            Patient is here for thoracic back pain that is worsening over the past month. Fell directly onto her back and has had pain. Issues with numbness and tingling in bilateral upper extremities. Xrays taken when she was originally seen in the ER and negative. Given pain medications at that time and told to follow up.           Problem list and histories reviewed & adjusted, as indicated.  Additional history: none    Patient Active Problem List   Diagnosis     Overweight     Encounter for routine adult health examination without abnormal findings     Acute thoracic back pain, unspecified back pain laterality     Past Surgical History:   Procedure Laterality Date     CHOLECYSTECTOMY       GI SURGERY  2015    gastric sleeve     ORTHOPEDIC SURGERY         Social History   Substance Use Topics     Smoking status: Former Smoker  "    Types: Cigarettes     Quit date: 2/9/2017     Smokeless tobacco: Never Used     Alcohol use No     Family History   Problem Relation Age of Onset     DIABETES Father      Hypertension Father            Reviewed and updated as needed this visit by clinical staff       Reviewed and updated as needed this visit by Provider         ROS:  Constitutional, HEENT, cardiovascular, pulmonary, gi and gu systems are negative, except as otherwise noted.    OBJECTIVE:                                                    /66 (BP Location: Right arm, Patient Position: Chair, Cuff Size: Adult Regular)  Pulse 87  Temp 98.7  F (37.1  C) (Oral)  Resp 16  Ht 5' 2.5\" (1.588 m)  Wt 166 lb (75.3 kg)  SpO2 98%  Breastfeeding? No  BMI 29.88 kg/m2  Body mass index is 29.88 kg/(m^2).  GENERAL: healthy, alert and no distress  RESP: lungs clear to auscultation - no rales, rhonchi or wheezes  CV: regular rate and rhythm, normal S1 S2, no S3 or S4, no murmur, click or rub, no peripheral edema and peripheral pulses strong  MS: tenderness of the thoracic spine in two separate places. Tenderness at base of cervical spine as well. Numbness of thumb and 3rd finger on bilateral hands.          ASSESSMENT/PLAN:                                                            1. Acute thoracic back pain, unspecified back pain laterality  Pain medications renewed but only until she can be seen by orthopedic specialist. Not a work related injury. Encouraged use of ibuprofen for pain during the day.   - HYDROcodone-acetaminophen (NORCO) 5-325 MG per tablet; Take 1 tablet by mouth every 4 hours as needed for pain maximum 2 tablet(s) per day  Dispense: 15 tablet; Refill: 0  - ORTHOPEDICS ADULT REFERRAL    Follow up as needed.     Elena Mcdonough, NP  [facility]    "

## 2017-06-01 ENCOUNTER — OFFICE VISIT (OUTPATIENT)
Dept: ORTHOPEDICS | Facility: CLINIC | Age: 32
End: 2017-06-01
Payer: COMMERCIAL

## 2017-06-01 ENCOUNTER — HOSPITAL ENCOUNTER (OUTPATIENT)
Dept: MRI IMAGING | Facility: CLINIC | Age: 32
Discharge: HOME | End: 2017-06-01
Attending: FAMILY MEDICINE | Admitting: FAMILY MEDICINE
Payer: COMMERCIAL

## 2017-06-01 ENCOUNTER — RADIANT APPOINTMENT (OUTPATIENT)
Dept: GENERAL RADIOLOGY | Facility: CLINIC | Age: 32
End: 2017-06-01
Attending: FAMILY MEDICINE
Payer: COMMERCIAL

## 2017-06-01 VITALS
HEIGHT: 62 IN | SYSTOLIC BLOOD PRESSURE: 132 MMHG | BODY MASS INDEX: 30.55 KG/M2 | WEIGHT: 166 LBS | DIASTOLIC BLOOD PRESSURE: 72 MMHG

## 2017-06-01 DIAGNOSIS — M54.6 MIDLINE THORACIC BACK PAIN, UNSPECIFIED CHRONICITY: ICD-10-CM

## 2017-06-01 DIAGNOSIS — M54.41 ACUTE MIDLINE LOW BACK PAIN WITH RIGHT-SIDED SCIATICA: Primary | ICD-10-CM

## 2017-06-01 DIAGNOSIS — M54.6 ACUTE MIDLINE THORACIC BACK PAIN: ICD-10-CM

## 2017-06-01 DIAGNOSIS — M54.41 MIDLINE LOW BACK PAIN WITH BILATERAL SCIATICA, UNSPECIFIED CHRONICITY: ICD-10-CM

## 2017-06-01 DIAGNOSIS — F40.240 CLAUSTROPHOBIA: ICD-10-CM

## 2017-06-01 DIAGNOSIS — M54.42 MIDLINE LOW BACK PAIN WITH BILATERAL SCIATICA, UNSPECIFIED CHRONICITY: ICD-10-CM

## 2017-06-01 DIAGNOSIS — M54.41 ACUTE MIDLINE LOW BACK PAIN WITH RIGHT-SIDED SCIATICA: ICD-10-CM

## 2017-06-01 PROCEDURE — 72148 MRI LUMBAR SPINE W/O DYE: CPT

## 2017-06-01 PROCEDURE — 72100 X-RAY EXAM L-S SPINE 2/3 VWS: CPT

## 2017-06-01 PROCEDURE — 99204 OFFICE O/P NEW MOD 45 MIN: CPT | Performed by: FAMILY MEDICINE

## 2017-06-01 RX ORDER — DIAZEPAM 10 MG
10 TABLET ORAL ONCE
Qty: 1 TABLET | Refills: 0 | Status: SHIPPED | OUTPATIENT
Start: 2017-06-01 | End: 2017-06-01

## 2017-06-01 RX ORDER — METHYLPREDNISOLONE 4 MG
TABLET, DOSE PACK ORAL
Qty: 21 TABLET | Refills: 0 | Status: SHIPPED | OUTPATIENT
Start: 2017-06-01 | End: 2017-06-26

## 2017-06-01 NOTE — MR AVS SNAPSHOT
After Visit Summary   6/1/2017    Mary Lerma    MRN: [id number]           Patient Information     Date Of Birth          1985        Visit Information        Provider Department      6/1/2017 9:20 AM Stacy Zapata DO [facility]        Today's Diagnoses     Acute midline low back pain with right-sided sciatica    -  1    Acute midline thoracic back pain          Care Instructions    Thank you for allowing us to participate in your care today.  Please find below your visit diagnosis and the plan going forward.    1. Acute midline low back pain with right-sided sciatica    2. Acute midline thoracic back pain      Discussed xray  Rx for medrol dose theodora. Take prior to your shift and with food.  Physical therapy: [facility] - [phone]  MRI - MRI of your low back has been ordered. Check for same day, but later in the day to allow you to take Valium prior.  Schedule with [facility] ([phone]).     [facility] results of [facility]. Call direct clinic number [[phone]] at any time with questions or concerns.    Stacy Zapata DO [facility]  Website: [email or web address]  Twitter: [email or web address]            Follow-ups after your visit        Additional Services     [facility] PT, HAND, AND CHIROPRACTIC REFERRAL       **This order will print in the [facility] Scheduling Office**    Physical Therapy, Hand Therapy and Chiropractic Care are available through:    *[facility]  *[facility]  *[facility]    Call one number to schedule at any of the above locations: [phone].    Your provider has referred you to: Physical Therapy at [facility] or [facility]    Indication/Reason for Referral: Low Back Pain - R lumbar radic, L5 sensory changes  Onset of Illness: see chart  Therapy Orders: Evaluate and Treat  Special Programs: None  Special Request: MDT PT gwendolyn Ahn  Jesenia Young, Veda Graham or Jesenia Copeland      Additional Comments for the Therapist or Chiropractor: Formal physical therapy - specific exercises to include centralization with flexion/extension activities with mobilization/manipulation and core stabilization with use of modalities (ie ice, ultrasound, electrical stimulation, etc.) as needed with home exercise prescription.    Please be aware that coverage of these services is subject to the terms and limitations of your health insurance plan.  Call member services at your health plan with any benefit or coverage questions.      Please bring the following to your appointment:    *Your personal calendar for scheduling future appointments  *Comfortable clothing                  Your next 10 appointments already scheduled     Jun 26, 2017 10:30 AM CDT   Walk In From ENT with Kate Mitchell [facility] ([facility])    [address] [place] [id number] [phone]            Jun 26, 2017 11:30 AM CDT   (Arrive by 11:15 AM)   New Patient Visit with Jacques Lawson MD   [facility] ([facility])    [address] [place] [id number] [phone]              Future tests that were ordered for you today     Open Future Orders        Priority Expected Expires Ordered    MR Lumbar Spine w/o Contrast Routine  6/2/2018 6/1/2017            Who to contact     If you have questions or need follow up information about today's clinic visit or your schedule please contact [facility] directly at [phone].  Normal or non-critical lab and imaging results will be communicated to you by MyChart, letter or phone within 4 business days after the clinic has received the results. If you do not hear from us within 7 days, please contact the clinic through MyChart or phone. If you have a critical or abnormal lab  "result, we will notify you by phone as soon as possible.  Submit refill requests through Flock or call your pharmacy and they will forward the refill request to us. Please allow 3 business days for your refill to be completed.          Additional Information About Your Visit        Creabilishart Information     Flock gives you secure access to your electronic health record. If you see a primary care provider, you can also send messages to your care team and make appointments. If you have questions, please call your primary care clinic.  If you do not have a primary care provider, please call [phone] and they will assist you.        Care EveryWhere ID     This is your Care EveryWhere ID. This could be used by other organizations to access your [place] medical records  [id number]        Your Vitals Were     Height BMI (Body Mass Index)                5' 2\" (1.575 m) 30.36 kg/m2           Blood Pressure from Last 3 Encounters:   06/01/17 132/72   05/26/17 118/66   05/17/17 110/60    Weight from Last 3 Encounters:   06/01/17 166 lb (75.3 kg)   05/26/17 166 lb (75.3 kg)   05/17/17 166 lb 6.4 oz (75.5 kg)              We Performed the Following     [facility] PT, HAND, AND CHIROPRACTIC REFERRAL          Today's Medication Changes          These changes are accurate as of: 6/1/17 10:24 AM.  If you have any questions, ask your nurse or doctor.               Start taking these medicines.        Dose/Directions    diazepam 10 MG tablet   Commonly known as:  VALIUM   Used for:  Acute midline low back pain with right-sided sciatica   Started by:  Stacy Zapata DO        Dose:  10 mg   Take 1 tablet (10 mg) by mouth once for 1 dose Take 30-60 minutes before procedure.  Do not operate a vehicle after taking this medication.   Quantity:  1 tablet   Refills:  0       methylPREDNISolone 4 MG tablet   Commonly known as:  MEDROL DOSEPAK   Used for:  Acute midline low back pain with right-sided sciatica, Acute midline thoracic " back pain   Started by:  Stacy Zapata, DO        Follow package instructions   Quantity:  21 tablet   Refills:  0            Where to get your medicines      These medications were sent to [place], [place] - [address] [address]  [address] [facility] [address]     Phone:  [phone]     methylPREDNISolone 4 MG tablet         Some of these will need a paper prescription and others can be bought over the counter.  Ask your nurse if you have questions.     Bring a paper prescription for each of these medications     diazepam 10 MG tablet                Primary Care Provider Office Phone # Fax #    Elena Mcdonough -[phone]       [facility] [place] [address] [address]  [facility] [address]        Thank you!     Thank you for choosing [facility]  for your care. Our goal is always to provide you with excellent care. Hearing back from our patients is one way we can continue to improve our services. Please take a few minutes to complete the written survey that you may receive in the mail after your visit with us. Thank you!             Your Updated Medication List - Protect others around you: Learn how to safely use, store and throw away your medicines at www.disposemymeds.org.          This list is accurate as of: 6/1/17 10:24 AM.  Always use your most recent med list.                   Brand Name Dispense Instructions for use    cyclobenzaprine 5 MG tablet    FLEXERIL    30 tablet    Take 1 tablet (5 mg) by mouth 3 times daily as needed for muscle spasms       diazepam 10 MG tablet    VALIUM    1 tablet    Take 1 tablet (10 mg) by mouth once for 1 dose Take 30-60 minutes before procedure.  Do not operate a vehicle after taking this medication.       HYDROcodone-acetaminophen 5-325 MG per tablet    NORCO    15 tablet    Take 1 tablet by mouth every 4 hours as needed for pain maximum 2 tablet(s) per day       medroxyPROGESTERone 150  MG/ML injection    DEPO-PROVERA    1 mL    Inject 1 mL (150 mg) into the muscle every 3 months       methylPREDNISolone 4 MG tablet    MEDROL DOSEPAK    21 tablet    Follow package instructions

## 2017-06-01 NOTE — PROGRESS NOTES
ASSESSMENT & PLAN    ICD-10-CM    1. Acute midline low back pain with right-sided sciatica M54.41 MR Lumbar Spine w/o Contrast     XR Lumbar Spine 2/3 Views     [facility] PT, HAND, AND CHIROPRACTIC REFERRAL     methylPREDNISolone (MEDROL DOSEPAK) 4 MG tablet     diazepam (VALIUM) 10 MG tablet   2. Acute midline thoracic back pain M54.6 MR Lumbar Spine w/o Contrast     XR Lumbar Spine 2/3 Views     [facility] PT, HAND, AND CHIROPRACTIC REFERRAL     methylPREDNISolone (MEDROL DOSEPAK) 4 MG tablet   3. Claustrophobia F40.240    Reviewed previous thoracic and new lumbar xray  Low back is acute/chronic.   Sensory changes, subtle weakness but non dermatomal  Recommend MRI  Rx for valium and medrol  Referral to PT  Limit direct patient / bed transfer for last 3 shifts until transitioning to [facility]    Follow up on Mychart with MRI results. Call direct clinic number [phone] at any time with questions or concerns. Instructed to call the office if the condition evolves or worsens.    -----    SUBJECTIVE  Mary Lerma is a/an 31 year old female who is seen in consultation at the request of Elena Mcdonough for evaluation of low back and mid back pain. The patient is seen by themselves.    Onset: 4 week(s) ago for the upper back (NEW) and a flare up of lower back pain (10 years duration). Patient describes injury as she fell down some stairs after tripping on her dogs leash. She reports landing on her back against the edge of the stairs. She has had intermittent flare ups of low back pain over the last 10 years. She reports intermittent, 1-2 hour duration, feet and hand numbness/tingling bilateral.  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    REVIEW OF SYSTEMS:  10 point ROS is negative other than symptoms noted above in HPI, Past Medical History or as stated below  Constitutional: NEGATIVE for fever, chills, change in weight  Skin: NEGATIVE for worrisome rashes, moles or lesions  GI/: NEGATIVE for bowel or bladder changes  Neuro: NEGATIVE for weakness, dizziness or paresthesias    OBJECTIVE:  /72  Ht 5' 2\" (1.575 m)  Wt 166 lb (75.3 kg)  BMI 30.36 kg/m2   General: healthy, alert and in no distress  HEENT: no scleral icterus or conjunctival erythema  Skin: no suspicious lesions or rash. No jaundice.  CV: no pedal edema  Resp: normal respiratory effort without conversational dyspnea   Psych: normal mood and affect  Gait: normal steady gait with appropriate coordination and balance  Neuro: decreased sensation over right L5 dermatome, otherwise normal light touch sensory exam of the bilateral lower extremities.  Motor strength as noted below. DTR's 2+ patella and achilles bilaterally.  MSK:  THORACIC/LUMBAR SPINE  Inspection:    No gross deformity/asymmetry  Palpation:    Tender about the lumbar facet joints, left para lumbar muscles and right para lumbar muscles. Nontender over thoracic spinous, facets and parathoracic muscles. Otherwise remainder of landmarks are nontender.  Range of Motion:     Lumbar flexion limited by pain    Lumbar extension limited by pain  Strength:    able to heel walk, able to toe walk, quadriceps 5-/5 (right only), hamstrings 5/5, gastrocsoleus 5/5, tibialis anterior 5/5, extensor hallicus longus 5-/5 (right only)  Special Tests:    Negative: slump test (bilateral)    BILATERAL " HIP  Inspection:    Pelvis level  Active Range of Motion:     Flexion full, IR full, ER  full  Strength:    Flexion 5/5, adduction 5/5, abduction 5/5  Special Tests:    Negative: Logroll, anterior impingement (FADIR)    Independent visualization of the below image:  XR LUMBAR SPINE  Well preserved disc space. No spondylolithesis.  Final radiology read pending     THORACIC SPINE THREE VIEWS  5/11/2017 5:59 PM      HISTORY: Pain in thoracic spine.     COMPARISON: None.     IMPRESSION:  Minimal scoliosis. Mild multilevel degenerative disc  disease. No evidence of fracture or destructive lesion.     JUANITA PALMER MD    Patient's conditions were thoroughly discussed during today's visit with greater than 50% of the visit spent counseling the patient with total time spent face-to-face with the patient being 20 minutes.    Stacy Zapata DO [facility]

## 2017-06-01 NOTE — NURSING NOTE
"Chief Complaint   Patient presents with     Musculoskeletal Problem       Initial /72  Ht 5' 2\" (1.575 m)  Wt 166 lb (75.3 kg)  BMI 30.36 kg/m2 Estimated body mass index is 30.36 kg/(m^2) as calculated from the following:    Height as of this encounter: 5' 2\" (1.575 m).    Weight as of this encounter: 166 lb (75.3 kg).  Medication Reconciliation: complete   Rodriguez Stein ATC    "

## 2017-06-01 NOTE — PATIENT INSTRUCTIONS
Thank you for allowing us to participate in your care today.  Please find below your visit diagnosis and the plan going forward.    1. Acute midline low back pain with right-sided sciatica    2. Acute midline thoracic back pain      Discussed xray  Rx for medrol dose theodora. Take prior to your shift and with food.  Physical therapy: [facility] - [phone]  MRI - MRI of your low back has been ordered. Check for same day, but later in the day to allow you to take Valium prior.  Schedule with Sean ([phone]).     Follow [facility] over [facility] with results of MRI. Call direct clinic number [[phone]] at any time with questions or concerns.    DO LASHONDA DicksonQSM  [facility]  Website: [email or web address]  Twitter: [email or web address]

## 2017-06-01 NOTE — Clinical Note
Mary Choudhary saw me at [facility] on Jun 1, 2017.  Please refer to the visit note at your convenience and feel free to contact me should you have any questions.  Thank you for the consult. Sincerely,  Stacy Zapata DO, [facility]

## 2017-06-23 DIAGNOSIS — H91.90 HL (HEARING LOSS), UNSPECIFIED LATERALITY: Primary | ICD-10-CM

## 2017-06-26 ENCOUNTER — OFFICE VISIT (OUTPATIENT)
Dept: AUDIOLOGY | Facility: CLINIC | Age: 32
End: 2017-06-26

## 2017-06-26 ENCOUNTER — OFFICE VISIT (OUTPATIENT)
Dept: OTOLARYNGOLOGY | Facility: CLINIC | Age: 32
End: 2017-06-26

## 2017-06-26 VITALS — WEIGHT: 170 LBS | HEIGHT: 62 IN | BODY MASS INDEX: 31.28 KG/M2

## 2017-06-26 DIAGNOSIS — H65.193 ACUTE MUCOID OTITIS MEDIA OF BOTH EARS: Primary | ICD-10-CM

## 2017-06-26 DIAGNOSIS — J35.1 TONSILLAR HYPERTROPHY: ICD-10-CM

## 2017-06-26 DIAGNOSIS — H90.12 CONDUCTIVE HEARING LOSS OF LEFT EAR WITH UNRESTRICTED HEARING OF RIGHT EAR: Primary | ICD-10-CM

## 2017-06-26 ASSESSMENT — PAIN SCALES - GENERAL: PAINLEVEL: MODERATE PAIN (5)

## 2017-06-26 NOTE — PATIENT INSTRUCTIONS
Nurse teaching given on bi lateral PE tubes, tonsillectomy  and the patient expresses understanding and acceptance of instructions. Ubaldo Maguire 6/26/2017 12:10 PM

## 2017-06-26 NOTE — MR AVS SNAPSHOT
After Visit Summary   6/26/2017    Mary Lerma    MRN: [id number]           Patient Information     Date Of Birth          1985        Visit Information        Provider Department      6/26/2017 11:30 AM Jacques Lawson MD [facility] Ear Nose and Throat        Today's Diagnoses     Acute mucoid otitis media of both ears    -  1    Tonsillar hypertrophy          Care Instructions    Nurse teaching given on bi lateral PE tubes, tonsillectomy  and the patient expresses understanding and acceptance of instructions. Ubaldo KARL Andrez 6/26/2017 12:10 PM          Follow-ups after your visit        Your next 10 appointments already scheduled     Jul 06, 2017  1:00 PM CDT   (Arrive by 12:45 PM)   PAC PHONE RN ASSESSMENT with  Pac Rn   [facility] Preoperative [facility] [place] ([facility])    [address] [place], [place] [address] [phone]           Note: this is not an onsite visit; there is no need to come to the facility.            Jul 10, 2017   Procedure with Jacques Lawson MD   [facility] ([facility])    [address] [place] [address]  5th [facility] [address] [phone]           Located in the Clinics and Surgery Center at [address] [place] [address].   parking is very convenient and highly recommended.  is a $6 flat rate fee.  Both  and self parkers should enter the main arrival plaza from [facility]; parking attendants will direct you based on your parking preference.            Jul 24, 2017 12:15 PM CDT   (Arrive by 12:00 PM)   Return Visit with Jacques Lawson MD   [facility] Ear Nose and Throat ([facility])    [address] [place] [address] [phone]              Who to contact     Please call your clinic at [phone] to:    Ask questions about your health    Make or cancel appointments    Discuss  "your medicines    Learn about your test results    Speak to your doctor   If you have compliments or concerns about an experience at your clinic, or if you wish to file a complaint, please contact [facility] Patient Relations at [phone] or email us at [email or web address]         Additional Information About Your Visit        CLIPPATEhart Information     [employer] gives you secure access to your electronic health record. If you see a primary care provider, you can also send messages to your care team and make appointments. If you have questions, please call your primary care clinic.  If you do not have a primary care provider, please call [phone] and they will assist you.      [employer] is an electronic gateway that provides easy, online access to your medical records. With [employer], you can request a clinic appointment, read your test results, renew a prescription or communicate with your care team.     To access your existing account, please contact your [facility] Clinic or call [phone] for assistance.        Care EveryWhere ID     This is your Care EveryWhere ID. This could be used by other organizations to access your [place] medical records  [id number]        Your Vitals Were     Height BMI (Body Mass Index)                1.575 m (5' 2\") 31.09 kg/m2           Blood Pressure from Last 3 Encounters:   06/01/17 132/72   05/26/17 118/66   05/17/17 110/60    Weight from Last 3 Encounters:   06/26/17 77.1 kg (170 lb)   06/01/17 75.3 kg (166 lb)   05/26/17 75.3 kg (166 lb)              We Performed the Following     Leann-Operative Worksheet (Head & Neck)        Primary Care Provider Office Phone # Fax #    Elena Mcdonough [phone]       [facility] [address] [address] [facility] [address]        Equal Access to Services     ANDERSON DARNELL AH: Hadii adri kruseo Ju, waaxda luqadaha, qaybta kaalmada adeamyyada, héctor " ivette sanonamy maher'aan ah. So [facility] [phone].    ATENCIÓN: Si rudyla josue, tiene a myers disposición servicios gratuitos de asistencia lingüística. Lulu al [phone].    We comply with applicable federal civil rights laws and [place] laws. We do not discriminate on the basis of race, color, national origin, age, disability sex, sexual orientation or gender identity.            Thank you!     Thank you for choosing [facility] EAR NOSE AND THROAT  for your care. Our goal is always to provide you with excellent care. Hearing back from our patients is one way we can continue to improve our services. Please take a few minutes to complete the written survey that you may receive in the mail after your visit with us. Thank you!             Your Updated Medication List - Protect others around you: Learn how to safely use, store and throw away your medicines at www.disposemymeds.org.          This list is accurate as of: 6/26/17 11:59 PM.  Always use your most recent med list.                   Brand Name Dispense Instructions for use Diagnosis    cyclobenzaprine 5 MG tablet    FLEXERIL    30 tablet    Take 1 tablet (5 mg) by mouth 3 times daily as needed for muscle spasms    Pain in thoracic spine       medroxyPROGESTERone 150 MG/ML injection    DEPO-PROVERA    1 mL    Inject 1 mL (150 mg) into the muscle every 3 months    Encounter for initial prescription of injectable contraceptive

## 2017-06-26 NOTE — PROGRESS NOTES
HISTORY OF PRESENT ILLNESS:  Ms. Lerma is a 31-year-old woman who comes in to see us today for the first time.  She recently moved here from [place], [place].  She has a long history of recurrent tonsillitis and strep pharyngitis.  She states that she generally gets it four or five times a year requiring antibiotics.  She also reports that she gets recurrent otitis media.  She states that she has had numerous spontaneous tympanic membrane perforations in the past.  She reports this happens fairly frequently as well.  She feels at times that her hearing is not as good as it should be.  She reports that before leaving [place] she was getting set up to have a tonsillectomy and PE tubes.      PAST MEDICAL HISTORY:  Significant for cholecystectomy, gastric sleeve procedure.      FAMILY HISTORY:  Noted and reviewed in the chart.      SOCIAL HISTORY:  The patient quit smoking cigarettes recently.  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      REVIEW OF SYSTEMS:  Pertinent positives and negatives as above.  Complete review of systems noted and reviewed in the chart.      PHYSICAL EXAMINATION:  This is a 31-year-old woman in no acute distress.  Normal affect, normal ability to communicate.  Alert and appropriate.  Breathing without difficulty or stridor.  Eyes are anicteric.  Skin and neck appears normal.  Cranial nerve VII is House-Brackmann I out of VI bilaterally.      Examination of the ears shows normal external auditory canals.  Tympanic membranes have some scarring from previous infection and trauma but no retraction or effusion at this time on either side.      Oral cavity shows normal tongue, buccal mucosa and oropharynx.      Anterior nasal exam does show a left to right septal deviation.      Palpation of neck shows no masses, tenderness or lymphadenopathy.      AUDIOGRAM:  Audiogram shows essentially normal hearing bilaterally.      ASSESSMENT AND PLAN:  A 31-year-old woman with report of  recurrent tonsillitis and otitis media.  We discussed options with her including watchful waiting versus surgical intervention.  She is very interested in having her tonsils removed and would like to have PE tubes placed at the same time.  I think this is reasonable based on her history.  We will set it up at her convenience.      NHAN/steve

## 2017-06-26 NOTE — NURSING NOTE
Chief Complaint   Patient presents with     Consult     New Bilateral Hearing loss and pain Pt has outside medical records.      Pt states bilateral ear pain today of 5.    N Anthony CROUCHN

## 2017-06-26 NOTE — PROGRESS NOTES
AUDIOLOGY REPORT    SUMMARY: Audiology visit completed. See audiogram for results.      RECOMMENDATIONS: Follow-up with ENT.    Tresa Jimenez  Licensed Audiologist  [place] License #[id number]

## 2017-06-26 NOTE — MR AVS SNAPSHOT
After Visit Summary   6/26/2017    Mary Lerma    MRN: [id number]           Patient Information     Date Of Birth          1985        Visit Information        Provider Department      6/26/2017 10:30 AM [facility] Audiology        Today's Diagnoses     Conductive hearing loss of left ear with unrestricted hearing of right ear    -  1       Follow-ups after your visit        Your next 10 appointments already scheduled     Jun 26, 2017 11:30 AM CDT   (Arrive by 11:15 AM)   New Patient Visit with MD KARL Kaplan [facility] Ear Nose and Throat ([facility] [place])    [address] [place] [address] [phone]              Who to contact     Please call your clinic at [phone] to:    Ask questions about your health    Make or cancel appointments    Discuss your medicines    Learn about your test results    Speak to your doctor   If you have compliments or concerns about an experience at your clinic, or if you wish to file a complaint, please contact [facility] Patient Relations at [phone] or email us at [email or web address]         Additional Information About Your Visit        MyChart Information     [employer] gives you secure access to your electronic health record. If you see a primary care provider, you can also send messages to your care team and make appointments. If you have questions, please call your primary care clinic.  If you do not have a primary care provider, please call [phone] and they will assist you.      [employer] is an electronic gateway that provides easy, online access to your medical records. With [employer], you can request a clinic appointment, read your test results, renew a prescription or communicate with your care team.     To access your existing account, please contact your [facility] or call [phone] for assistance.         Care EveryWhere ID     This is your Care EveryWhere ID. This could be used by other organizations to access your [place] medical records  [id number]         Blood Pressure from Last 3 Encounters:   06/01/17 132/72   05/26/17 118/66   05/17/17 110/60    Weight from Last 3 Encounters:   06/01/17 75.3 kg (166 lb)   05/26/17 75.3 kg (166 lb)   05/17/17 75.5 kg (166 lb 6.4 oz)              We Performed the Following     [facility] Audiometry Thrshld Eval & Speech Recog ([address])     Tymps / Reflex   ([address])        Primary Care Provider Office Phone # Fax #    Elena KARL Mcdonough [phone]       [facility] [address] [address] [facility] [address]        Equal Access to Services     ANDERSON DARNELL : Hadii aad ku hadasho Soomaali, waaxda luqadaha, qaybta kaalmada adeegyada, waxay idiin hayaan adeamy kharanick steward . So [facility] [phone].    ATENCIÓN: Si habla español, tiene a myers disposición servicios gratuitos de asistencia lingüística. Llame al [phone].    We comply with applicable federal civil rights laws and [place] laws. We do not discriminate on the basis of race, color, national origin, age, disability sex, sexual orientation or gender identity.            Thank you!     Thank you for choosing [facility] AUDIOLOGY  for your care. Our goal is always to provide you with excellent care. Hearing back from our patients is one way we can continue to improve our services. Please take a few minutes to complete the written survey that you may receive in the mail after your visit with us. Thank you!             Your Updated Medication List - Protect others around you: Learn how to safely use, store and throw away your medicines at www.disposemymeds.org.          This list is accurate as of: 6/26/17 10:51 AM.  Always use your most recent med list.                   Brand Name Dispense Instructions for use Diagnosis    cyclobenzaprine 5 MG tablet    FLEXERIL    30 tablet    Take 1 tablet (5 mg) by  mouth 3 times daily as needed for muscle spasms    Pain in thoracic spine       HYDROcodone-acetaminophen 5-325 MG per tablet    NORCO    15 tablet    Take 1 tablet by mouth every 4 hours as needed for pain maximum 2 tablet(s) per day    Acute thoracic back pain, unspecified back pain laterality       medroxyPROGESTERone 150 MG/ML injection    DEPO-PROVERA    1 mL    Inject 1 mL (150 mg) into the muscle every 3 months    Encounter for initial prescription of injectable contraceptive       methylPREDNISolone 4 MG tablet    MEDROL DOSEPAK    21 tablet    Follow package instructions    Acute midline low back pain with right-sided sciatica, Acute midline thoracic back pain

## 2017-06-26 NOTE — LETTER
6/26/2017       RE: Mary Lerma  [address]  [place] [address]     Dear Colleague,    Thank you for referring your patient, Mary Lerma, to the [facility] EAR NOSE AND THROAT at [facility]. Please see a copy of my visit note below.    HISTORY OF PRESENT ILLNESS:  Ms. Lerma is a 31-year-old woman who comes in to see us today for the first time.  She recently moved here from [place], [place].  She has a long history of recurrent tonsillitis and strep pharyngitis.  She states that she generally gets it four or five times a year requiring antibiotics.  She also reports that she gets recurrent otitis media.  She states that she has had numerous spontaneous tympanic membrane perforations in the past.  She reports this happens fairly frequently as well.  She feels at times that her hearing is not as good as it should be.  She reports that before leaving [place] she was getting set up to have a tonsillectomy and PE tubes.      PAST MEDICAL HISTORY:  Significant for cholecystectomy, gastric sleeve procedure.      FAMILY HISTORY:  Noted and reviewed in the chart.      SOCIAL HISTORY:  The patient quit smoking cigarettes recently.  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      REVIEW OF SYSTEMS:  Pertinent positives and negatives as above.  Complete review of systems noted and reviewed in the chart.      PHYSICAL EXAMINATION:  This is a 31-year-old woman in no acute distress.  Normal affect, normal ability to communicate.  Alert and appropriate.  Breathing without difficulty or stridor.  Eyes are anicteric.  Skin and neck appears normal.  Cranial nerve VII is House-Brackmann I out of VI bilaterally.      Examination of the ears shows normal external auditory canals.  Tympanic membranes have some scarring from previous infection and trauma but no retraction or effusion at this time on either side.      Oral cavity shows normal tongue, buccal  mucosa and oropharynx.      Anterior nasal exam does show a left to right septal deviation.      Palpation of neck shows no masses, tenderness or lymphadenopathy.      AUDIOGRAM:  Audiogram shows essentially normal hearing bilaterally.      ASSESSMENT AND PLAN:  A 31-year-old woman with report of recurrent tonsillitis and otitis media.  We discussed options with her including watchful waiting versus surgical intervention.  She is very interested in having her tonsils removed and would like to have PE tubes placed at the same time.  I think this is reasonable based on her history.  We will set it up at her convenience.      SJ/lz         Again, thank you for allowing me to participate in the care of your patient.      Sincerely,    Jacques Lawson MD

## 2017-06-26 NOTE — NURSING NOTE
Relevant Diagnosis: chronic strep, otitis media   Teaching Topic: bi lateral PE tubes, tonsillectomy   Person(s) involved in teaching: Patient     Teaching Concerns Addressed:  Pre op teaching included the need for an H&P, NPO status pre op, hospital routines, expected recovery, activity  restrictions, antimicrobial scrub, s/s of infection, pain control methods and the importance of follow up appointments.  The patient voiced an understanding of all instructions and will call with questions.     Motivation Level:  Asks Questions:   Yes  Eager to Learn:   Yes  Cooperative:   Yes  Receptive (willing/able to accept information):   Yes     Patient  demonstrates understanding of the following:  Reason for the appointment, diagnosis and treatment plan:   Yes  Knowledge of proper use of medications and conditions for which they are ordered (with special attention to potential side effects or drug interactions):   Yes  Which situations necessitate calling provider and whom to contact:   Yes        Proper use and care of  (medical equip, care aids, etc.):   NA  Nutritional needs and diet plan:   Yes  Pain management techniques:   Yes  Patient instructed on hand hygiene:  Yes  How and/when to access community resources:   NA     Infection Prevention:  Patient   demonstrates understanding of the following:  Surgical procedure site care taught   Signs and symptoms of infection taught Yes  Wound care taught Yes     Instructional Materials Used/Given: Pre op booklet.

## 2017-06-29 ENCOUNTER — ANESTHESIA EVENT (OUTPATIENT)
Dept: SURGERY | Facility: AMBULATORY SURGERY CENTER | Age: 32
End: 2017-06-29

## 2017-07-06 ENCOUNTER — APPOINTMENT (OUTPATIENT)
Dept: SURGERY | Facility: CLINIC | Age: 32
End: 2017-07-06

## 2017-07-07 ENCOUNTER — OFFICE VISIT (OUTPATIENT)
Dept: FAMILY MEDICINE | Facility: CLINIC | Age: 32
End: 2017-07-07
Payer: COMMERCIAL

## 2017-07-07 VITALS
BODY MASS INDEX: 30.11 KG/M2 | SYSTOLIC BLOOD PRESSURE: 110 MMHG | HEIGHT: 63 IN | DIASTOLIC BLOOD PRESSURE: 60 MMHG | HEART RATE: 87 BPM | WEIGHT: 169.9 LBS | TEMPERATURE: 99 F | OXYGEN SATURATION: 98 %

## 2017-07-07 DIAGNOSIS — Z01.818 PREOP GENERAL PHYSICAL EXAM: Primary | ICD-10-CM

## 2017-07-07 DIAGNOSIS — J03.01 ACUTE RECURRENT STREPTOCOCCAL TONSILLITIS: ICD-10-CM

## 2017-07-07 LAB — HGB BLD-MCNC: 14.2 G/DL (ref 11.7–15.7)

## 2017-07-07 PROCEDURE — 85018 HEMOGLOBIN: CPT | Performed by: PHYSICIAN ASSISTANT

## 2017-07-07 PROCEDURE — 36415 COLL VENOUS BLD VENIPUNCTURE: CPT | Performed by: PHYSICIAN ASSISTANT

## 2017-07-07 PROCEDURE — 99214 OFFICE O/P EST MOD 30 MIN: CPT | Performed by: PHYSICIAN ASSISTANT

## 2017-07-07 NOTE — PROGRESS NOTES
[facility]  [id number] [place] [address]  [phone]  Dept: [phone]    PRE-OP EVALUATION:  Today's date: 2017    Mary Lerma (: 1985) presents for pre-operative evaluation assessment as requested by  .  She requires evaluation and anesthesia risk assessment prior to undergoing surgery/procedure for treatment of tonsilectomy / tubes in ears .  Proposed procedure: combined myringotomy insert tube bilateral, tonsilectomy    Date of Surgery/ Procedure: July 10  Time of Surgery/ Procedure: 3pm  Hospital/Surgical Facility:  OR  Fax number for surgical facility:   Primary Physician: Elena Mcdonough  Type of Anesthesia Anticipated: General    Patient has a Health Care Directive or Living Will:  NO    1. NO - Do you have a history of heart attack, stroke, stent, bypass or surgery on an artery in the head, neck, heart or legs?  2. NO - Do you ever have any pain or discomfort in your chest?  3. NO - Do you have a history of  Heart Failure?  4. NO - Are you troubled by shortness of breath when: walking on the level, up a slight hill or at night?  5. NO - Do you currently have a cold, bronchitis or other respiratory infection?  6. NO - Do you have a cough, shortness of breath or wheezing?  7. NO - Do you sometimes get pains in the calves of your legs when you walk?  8. NO - Do you or anyone in your family have previous history of blood clots?  9. NO - Do you or does anyone in your family have a serious bleeding problem such as prolonged bleeding following surgeries or cuts?  10. NO - Have you ever had problems with anemia or been told to take iron pills?  11. NO - Have you had any abnormal blood loss such as black, tarry or bloody stools, or abnormal vaginal bleeding?  12. NO - Have you ever had a blood transfusion?  13. NO - Have you or any of your relatives ever had problems with anesthesia?  14. NO - Do you have sleep apnea, excessive snoring or daytime  drowsiness?  15. NO - Do you have any prosthetic heart valves?  16. NO - Do you have prosthetic joints?  17. NO - Is there any chance that you may be pregnant?      HPI:                                                      Brief HPI related to upcoming procedure: multiple strep infections       See problem list for active medical problems.  Problems all longstanding and stable, except as noted/documented.  See ROS for pertinent symptoms related to these conditions.                                                                                                  .    MEDICAL HISTORY:                                                      Patient Active Problem List    Diagnosis Date Noted     Overweight 05/17/2017     Priority: Medium     Encounter for routine adult health examination without abnormal findings 05/17/2017     Priority: Medium     Acute thoracic back pain, unspecified back pain laterality 05/17/2017     Priority: Medium      No past medical history on file.  Past Surgical History:   Procedure Laterality Date     CHOLECYSTECTOMY       GI SURGERY  2015    gastric sleeve     ORTHOPEDIC SURGERY       Current Outpatient Prescriptions   Medication Sig Dispense Refill     medroxyPROGESTERone (DEPO-PROVERA) 150 MG/ML injection Inject 1 mL (150 mg) into the muscle every 3 months 1 mL 3     OTC products: no recent use of OTC ASA, NSAIDS or Steroids    Allergies   Allergen Reactions     Strattera [Atomoxetine] Hives      Latex Allergy: NO    Social History   Substance Use Topics     Smoking status: Former Smoker     Types: Cigarettes     Quit date: 2/9/2017     Smokeless tobacco: Never Used     Alcohol use No     History   Drug Use No       REVIEW OF SYSTEMS:                                                    Constitutional, HEENT, cardiovascular, pulmonary, gi and gu systems are negative, except as otherwise noted.    EXAM:                                                    There were no vitals taken for this  visit.    GENERAL APPEARANCE: healthy, alert and no distress     EYES: EOMI, PERRL     HENT: ear canals and TM's normal and nose and mouth without ulcers or lesions     NECK: no adenopathy, no asymmetry, masses, or scars and thyroid normal to palpation     RESP: lungs clear to auscultation - no rales, rhonchi or wheezes     CV: regular rates and rhythm, normal S1 S2, no S3 or S4 and no murmur, click or rub     ABDOMEN:  soft, nontender, no HSM or masses and bowel sounds normal     MS: extremities normal- no gross deformities noted, no evidence of inflammation in joints, FROM in all extremities.     SKIN: no suspicious lesions or rashes     NEURO: Normal strength and tone, sensory exam grossly normal, mentation intact and speech normal     PSYCH: mentation appears normal. and affect normal/bright     LYMPHATICS: No axillary, cervical, or supraclavicular nodes    DIAGNOSTICS:                                                      Labs Resulted Today:   Results for orders placed or performed in visit on 07/07/17   Hemoglobin   Result Value Ref Range    Hemoglobin 14.2 11.7 - 15.7 g/dL       Recent Labs   Lab Test  05/17/17   1202   HGB  14.6   PLT  254   NA  143   POTASSIUM  4.2   CR  0.60        IMPRESSION:                                                    Reason for surgery/procedure: combined myringotomy insert tube bilateral, tonsilectomy    Diagnosis/reason for consult:  preoperative evaluation for assessment of cardiovascular and respiratory disease as well as overall risk assessment and perioperative medical management.      The proposed surgical procedure is considered INTERMEDIATE risk.    REVISED CARDIAC RISK INDEX  The patient has the following serious cardiovascular risks for perioperative complications such as (MI, PE, VFib and 3  AV Block):  No serious cardiac risks  INTERPRETATION: 0 risks: Class I (very low risk - 0.4% complication rate)    The patient has the following additional risks for perioperative  complications:  No identified additional risks      (Z01.818) Preop general physical exam  (primary encounter diagnosis)  Comment:   Plan: Hemoglobin            (J03.01) Acute recurrent streptococcal tonsillitis  Comment:   Plan:         RECOMMENDATIONS:                                                      --Consult hospital rounder / IM to assist post-op medical management    --Patient is to take all scheduled medications on the day of surgery EXCEPT for modifications listed below.    APPROVAL GIVEN to proceed with proposed procedure, without further diagnostic evaluation       Signed Electronically by: Ramona Ann Aaseby-Aguilera, PA-C    Copy of this evaluation report is provided to requesting physician.    [facility] Preop Guidelines

## 2017-07-07 NOTE — MR AVS SNAPSHOT
After Visit Summary   7/7/2017    Mary Lerma    MRN: [id number]           Patient Information     Date Of Birth          1985        Visit Information        Provider Department      7/7/2017 8:00 AM Aaseby-Aguilera, Ramona Ann, PA-C [facility]        Today's Diagnoses     Preop general physical exam    -  1    Acute recurrent streptococcal tonsillitis          Care Instructions      Before Your Surgery      Call your surgeon if there is any change in your health. This includes signs of a cold or flu (such as a sore throat, runny nose, cough, rash or fever).    Do not smoke, drink alcohol or take over the counter medicine (unless your surgeon or primary care doctor tells you to) for the 24 hours before and after surgery.    If you take prescribed drugs: Follow your doctor s orders about which medicines to take and which to stop until after surgery.    Eating and drinking prior to surgery: follow the instructions from your surgeon    Take a shower or bath the night before surgery. Use the soap your surgeon gave you to gently clean your skin. If you do not have soap from your surgeon, use your regular soap. Do not shave or scrub the surgery site.  Wear clean pajamas and have clean sheets on your bed.           Follow-ups after your visit        Your next 10 appointments already scheduled     Jul 10, 2017   Procedure with Jacques Lawson MD   [facility] ([facility])    [address] [place], [place] [address] [phone]           Located in the Clinics and Surgery Center at [address] [place] [address].   parking is very convenient and highly recommended.  is a $6 flat rate fee.  Both  and self parkers should enter the main arrival plaza from [facility]; parking attendants will direct you based on your parking preference.            Jul 24, 2017 12:15 PM CDT   (Arrive by 12:00  "PM)   Return Visit with Jacques Lawson MD   [facility] ([facility])    [address] [facility]  4th Floor  [facility] [address] [phone]              Who to contact     If you have questions or need follow up information about today's clinic visit or your schedule please contact [facility] directly at [phone].  Normal or non-critical lab and imaging results will be communicated to you by Dynasilhart, letter or phone within 4 business days after the clinic has received the results. If you do not hear from us within 7 days, please contact the clinic through Diarizet or phone. If you have a critical or abnormal lab result, we will notify you by phone as soon as possible.  Submit refill requests through Vivorte or call your pharmacy and they will forward the refill request to us. Please allow 3 business days for your refill to be completed.          Additional Information About Your Visit        DynasilharConspire Information     Vivorte gives you secure access to your electronic health record. If you see a primary care provider, you can also send messages to your care team and make appointments. If you have questions, please call your primary care clinic.  If you do not have a primary care provider, please call [phone] and they will assist you.        Care EveryWhere ID     This is your Care EveryWhere ID. This could be used by other organizations to access your [facility] medical records  [id number]        Your Vitals Were     Pulse Temperature Height Pulse Oximetry BMI (Body Mass Index)       87 99  F (37.2  C) (Oral) 5' 2.5\" (1.588 m) 98% 30.58 kg/m2        Blood Pressure from Last 3 Encounters:   07/07/17 110/60   06/01/17 132/72   05/26/17 118/66    Weight from Last 3 Encounters:   07/07/17 169 lb 14.4 oz (77.1 kg)   06/26/17 170 lb (77.1 kg)   06/01/17 166 lb (75.3 kg)              We Performed the Following     Hemoglobin        Primary Care Provider " Office Phone # Fax #    Elena Mcdonough [phone]       [facility] [address] [address]  [facility] [address]        Equal Access to Services     ANDERSON DARNELL : Hadii adri ku hadasho Soomaali, waaxda luqadaha, qaybta kaalmada adeegyada, waxfaby trippn talitaamy dong nichelle yanez. So [facility] [phone].    ATENCIÓN: Si habla español, tiene a myers disposición servicios gratuitos de asistencia lingüística. Llame al [phone].    We comply with applicable federal civil rights laws and [place] laws. We do not discriminate on the basis of race, color, national origin, age, disability sex, sexual orientation or gender identity.            Thank you!     Thank you for choosing [facility]  for your care. Our goal is always to provide you with excellent care. Hearing back from our patients is one way we can continue to improve our services. Please take a few minutes to complete the written survey that you may receive in the mail after your visit with us. Thank you!             Your Updated Medication List - Protect others around you: Learn how to safely use, store and throw away your medicines at www.disposemymeds.org.          This list is accurate as of: 7/7/17  9:13 AM.  Always use your most recent med list.                   Brand Name Dispense Instructions for use Diagnosis    medroxyPROGESTERone 150 MG/ML injection    DEPO-PROVERA    1 mL    Inject 1 mL (150 mg) into the muscle every 3 months    Encounter for initial prescription of injectable contraceptive

## 2017-07-07 NOTE — NURSING NOTE
"Chief Complaint   Patient presents with     Pre-Op Exam       Initial /60 (BP Location: Right arm, Patient Position: Chair, Cuff Size: Adult Large)  Pulse 87  Temp 99  F (37.2  C) (Oral)  Ht 5' 2.5\" (1.588 m)  Wt 169 lb 14.4 oz (77.1 kg)  SpO2 98%  BMI 30.58 kg/m2 Estimated body mass index is 30.58 kg/(m^2) as calculated from the following:    Height as of this encounter: 5' 2.5\" (1.588 m).    Weight as of this encounter: 169 lb 14.4 oz (77.1 kg).  Medication Reconciliation: complete Mirta Lopez CMA      "

## 2017-07-10 ENCOUNTER — ANESTHESIA (OUTPATIENT)
Dept: SURGERY | Facility: AMBULATORY SURGERY CENTER | Age: 32
End: 2017-07-10

## 2017-07-10 ENCOUNTER — HOSPITAL ENCOUNTER (OUTPATIENT)
Facility: AMBULATORY SURGERY CENTER | Age: 32
End: 2017-07-10
Attending: OTOLARYNGOLOGY

## 2017-07-10 VITALS
BODY MASS INDEX: 30.55 KG/M2 | TEMPERATURE: 97.3 F | HEIGHT: 62 IN | WEIGHT: 166 LBS | DIASTOLIC BLOOD PRESSURE: 72 MMHG | OXYGEN SATURATION: 97 % | RESPIRATION RATE: 22 BRPM | HEART RATE: 79 BPM | SYSTOLIC BLOOD PRESSURE: 128 MMHG

## 2017-07-10 DIAGNOSIS — Z90.89 S/P TONSILLECTOMY: Primary | ICD-10-CM

## 2017-07-10 DIAGNOSIS — Z96.22 S/P TYMPANOSTOMY TUBE PLACEMENT: ICD-10-CM

## 2017-07-10 LAB
HCG UR QL: NEGATIVE
INTERNAL QC OK POCT: YES

## 2017-07-10 RX ORDER — LIDOCAINE 40 MG/G
CREAM TOPICAL
Status: DISCONTINUED | OUTPATIENT
Start: 2017-07-10 | End: 2017-07-10 | Stop reason: HOSPADM

## 2017-07-10 RX ORDER — LIDOCAINE HYDROCHLORIDE 20 MG/ML
INJECTION, SOLUTION INFILTRATION; PERINEURAL PRN
Status: DISCONTINUED | OUTPATIENT
Start: 2017-07-10 | End: 2017-07-10

## 2017-07-10 RX ORDER — FENTANYL CITRATE 50 UG/ML
INJECTION, SOLUTION INTRAMUSCULAR; INTRAVENOUS PRN
Status: DISCONTINUED | OUTPATIENT
Start: 2017-07-10 | End: 2017-07-10

## 2017-07-10 RX ORDER — ACETAMINOPHEN 325 MG/1
975 TABLET ORAL ONCE
Status: COMPLETED | OUTPATIENT
Start: 2017-07-10 | End: 2017-07-10

## 2017-07-10 RX ORDER — NALOXONE HYDROCHLORIDE 0.4 MG/ML
.1-.4 INJECTION, SOLUTION INTRAMUSCULAR; INTRAVENOUS; SUBCUTANEOUS
Status: DISCONTINUED | OUTPATIENT
Start: 2017-07-10 | End: 2017-07-11 | Stop reason: HOSPADM

## 2017-07-10 RX ORDER — OFLOXACIN 3 MG/ML
5 SOLUTION AURICULAR (OTIC) 2 TIMES DAILY
Qty: 5 ML | Refills: 0 | Status: SHIPPED | OUTPATIENT
Start: 2017-07-10 | End: 2017-09-06

## 2017-07-10 RX ORDER — ONDANSETRON 2 MG/ML
4 INJECTION INTRAMUSCULAR; INTRAVENOUS EVERY 30 MIN PRN
Status: DISCONTINUED | OUTPATIENT
Start: 2017-07-10 | End: 2017-07-11 | Stop reason: HOSPADM

## 2017-07-10 RX ORDER — BUPIVACAINE HYDROCHLORIDE 2.5 MG/ML
INJECTION, SOLUTION INFILTRATION; PERINEURAL PRN
Status: DISCONTINUED | OUTPATIENT
Start: 2017-07-10 | End: 2017-07-10 | Stop reason: HOSPADM

## 2017-07-10 RX ORDER — MEPERIDINE HYDROCHLORIDE 25 MG/ML
12.5 INJECTION INTRAMUSCULAR; INTRAVENOUS; SUBCUTANEOUS
Status: DISCONTINUED | OUTPATIENT
Start: 2017-07-10 | End: 2017-07-11 | Stop reason: HOSPADM

## 2017-07-10 RX ORDER — DEXAMETHASONE SODIUM PHOSPHATE 10 MG/ML
INJECTION, SOLUTION INTRAMUSCULAR; INTRAVENOUS PRN
Status: DISCONTINUED | OUTPATIENT
Start: 2017-07-10 | End: 2017-07-10

## 2017-07-10 RX ORDER — OFLOXACIN 3 MG/ML
10 SOLUTION AURICULAR (OTIC) 2 TIMES DAILY
Qty: 5 ML | Refills: 0 | Status: SHIPPED | OUTPATIENT
Start: 2017-07-10 | End: 2017-07-10

## 2017-07-10 RX ORDER — MAGNESIUM HYDROXIDE 1200 MG/15ML
LIQUID ORAL PRN
Status: DISCONTINUED | OUTPATIENT
Start: 2017-07-10 | End: 2017-07-10 | Stop reason: HOSPADM

## 2017-07-10 RX ORDER — HYDROCODONE BITARTRATE AND ACETAMINOPHEN 7.5; 325 MG/15ML; MG/15ML
10 SOLUTION ORAL EVERY 4 HOURS PRN
Qty: 300 ML | Refills: 0 | Status: SHIPPED | OUTPATIENT
Start: 2017-07-10 | End: 2017-07-10

## 2017-07-10 RX ORDER — HYDROCODONE BITARTRATE AND ACETAMINOPHEN 7.5; 325 MG/15ML; MG/15ML
10 SOLUTION ORAL EVERY 4 HOURS PRN
Status: DISCONTINUED | OUTPATIENT
Start: 2017-07-10 | End: 2017-07-11 | Stop reason: HOSPADM

## 2017-07-10 RX ORDER — PROPOFOL 10 MG/ML
INJECTION, EMULSION INTRAVENOUS PRN
Status: DISCONTINUED | OUTPATIENT
Start: 2017-07-10 | End: 2017-07-10

## 2017-07-10 RX ORDER — HYDROCODONE BITARTRATE AND ACETAMINOPHEN 7.5; 325 MG/15ML; MG/15ML
10 SOLUTION ORAL EVERY 4 HOURS PRN
Qty: 600 ML | Refills: 0 | Status: SHIPPED | OUTPATIENT
Start: 2017-07-10 | End: 2017-07-24

## 2017-07-10 RX ORDER — ONDANSETRON 4 MG/1
4 TABLET, ORALLY DISINTEGRATING ORAL EVERY 30 MIN PRN
Status: DISCONTINUED | OUTPATIENT
Start: 2017-07-10 | End: 2017-07-11 | Stop reason: HOSPADM

## 2017-07-10 RX ORDER — SODIUM CHLORIDE, SODIUM LACTATE, POTASSIUM CHLORIDE, CALCIUM CHLORIDE 600; 310; 30; 20 MG/100ML; MG/100ML; MG/100ML; MG/100ML
INJECTION, SOLUTION INTRAVENOUS CONTINUOUS PRN
Status: DISCONTINUED | OUTPATIENT
Start: 2017-07-10 | End: 2017-07-10

## 2017-07-10 RX ORDER — SODIUM CHLORIDE, SODIUM LACTATE, POTASSIUM CHLORIDE, CALCIUM CHLORIDE 600; 310; 30; 20 MG/100ML; MG/100ML; MG/100ML; MG/100ML
INJECTION, SOLUTION INTRAVENOUS CONTINUOUS
Status: DISCONTINUED | OUTPATIENT
Start: 2017-07-10 | End: 2017-07-11 | Stop reason: HOSPADM

## 2017-07-10 RX ORDER — KETOROLAC TROMETHAMINE 30 MG/ML
30 INJECTION, SOLUTION INTRAMUSCULAR; INTRAVENOUS EVERY 6 HOURS PRN
Status: DISCONTINUED | OUTPATIENT
Start: 2017-07-10 | End: 2017-07-11 | Stop reason: HOSPADM

## 2017-07-10 RX ORDER — FENTANYL CITRATE 50 UG/ML
25-50 INJECTION, SOLUTION INTRAMUSCULAR; INTRAVENOUS
Status: DISCONTINUED | OUTPATIENT
Start: 2017-07-10 | End: 2017-07-10 | Stop reason: HOSPADM

## 2017-07-10 RX ORDER — OFLOXACIN 3 MG/ML
SOLUTION/ DROPS OPHTHALMIC PRN
Status: DISCONTINUED | OUTPATIENT
Start: 2017-07-10 | End: 2017-07-10 | Stop reason: HOSPADM

## 2017-07-10 RX ORDER — SODIUM CHLORIDE, SODIUM LACTATE, POTASSIUM CHLORIDE, CALCIUM CHLORIDE 600; 310; 30; 20 MG/100ML; MG/100ML; MG/100ML; MG/100ML
INJECTION, SOLUTION INTRAVENOUS CONTINUOUS
Status: DISCONTINUED | OUTPATIENT
Start: 2017-07-10 | End: 2017-07-10 | Stop reason: HOSPADM

## 2017-07-10 RX ORDER — ONDANSETRON 2 MG/ML
INJECTION INTRAMUSCULAR; INTRAVENOUS PRN
Status: DISCONTINUED | OUTPATIENT
Start: 2017-07-10 | End: 2017-07-10

## 2017-07-10 RX ADMIN — DEXAMETHASONE SODIUM PHOSPHATE 10 MG: 10 INJECTION, SOLUTION INTRAMUSCULAR; INTRAVENOUS at 15:01

## 2017-07-10 RX ADMIN — FENTANYL CITRATE 50 MCG: 50 INJECTION, SOLUTION INTRAMUSCULAR; INTRAVENOUS at 14:58

## 2017-07-10 RX ADMIN — ONDANSETRON 4 MG: 2 INJECTION INTRAMUSCULAR; INTRAVENOUS at 15:01

## 2017-07-10 RX ADMIN — FENTANYL CITRATE 50 MCG: 50 INJECTION, SOLUTION INTRAMUSCULAR; INTRAVENOUS at 15:44

## 2017-07-10 RX ADMIN — SODIUM CHLORIDE, SODIUM LACTATE, POTASSIUM CHLORIDE, CALCIUM CHLORIDE: 600; 310; 30; 20 INJECTION, SOLUTION INTRAVENOUS at 14:52

## 2017-07-10 RX ADMIN — LIDOCAINE HYDROCHLORIDE 80 MG: 20 INJECTION, SOLUTION INFILTRATION; PERINEURAL at 14:58

## 2017-07-10 RX ADMIN — FENTANYL CITRATE 50 MCG: 50 INJECTION, SOLUTION INTRAMUSCULAR; INTRAVENOUS at 15:48

## 2017-07-10 RX ADMIN — SODIUM CHLORIDE, SODIUM LACTATE, POTASSIUM CHLORIDE, CALCIUM CHLORIDE: 600; 310; 30; 20 INJECTION, SOLUTION INTRAVENOUS at 13:49

## 2017-07-10 RX ADMIN — Medication 1 MG: at 15:13

## 2017-07-10 RX ADMIN — FENTANYL CITRATE 50 MCG: 50 INJECTION, SOLUTION INTRAMUSCULAR; INTRAVENOUS at 15:50

## 2017-07-10 RX ADMIN — PROPOFOL 200 MG: 10 INJECTION, EMULSION INTRAVENOUS at 14:58

## 2017-07-10 RX ADMIN — ACETAMINOPHEN 975 MG: 325 TABLET ORAL at 13:49

## 2017-07-10 RX ADMIN — HYDROCODONE BITARTRATE AND ACETAMINOPHEN 10 ML: 7.5; 325 SOLUTION ORAL at 16:02

## 2017-07-10 NOTE — IP AVS SNAPSHOT
[facility]    [address] [place] [address]    Phone:  [phone]    Fax:  [phone]                                       After Visit Summary   7/10/2017    Mary Lerma    MRN: [id number]           After Visit Summary Signature Page     I have received my discharge instructions, and my questions have been answered. I have discussed any challenges I see with this plan with the nurse or doctor.    ..........................................................................................................................................  Patient/Patient Representative Signature      ..........................................................................................................................................  Patient Representative Print Name and Relationship to Patient    ..................................................               ................................................  Date                                            Time    ..........................................................................................................................................  Reviewed by Signature/Title    ...................................................              ..............................................  Date                                                            Time

## 2017-07-10 NOTE — IP AVS SNAPSHOT
MRN:[id number]                      After Visit Summary   7/10/2017    Mary Lerma    MRN: [id number]           Thank you!     Thank you for choosing [facility] for your care. Our goal is always to provide you with excellent care. Hearing back from our patients is one way we can continue to improve our services. Please take a few minutes to complete the written survey that you may receive in the mail after you visit with us. Thank you!        Patient Information     Date Of Birth          1985        About your hospital stay     You were admitted on:  July 10, 2017 You last received care in the:  [facility]    You were discharged on:  July 10, 2017       Who to Call     For medical emergencies, please call 911.  For non-urgent questions about your medical care, please call your primary care provider or clinic, [phone]  For questions related to your surgery, please call your surgery clinic        Attending Provider     Provider Specialty    Jacques Lawson MD Otolaryngology       Primary Care Provider Office Phone # Fax #    Elena Mcdonough [phone]      After Care Instructions     Diet Instructions       Soft diet, no sharp food            Discharge Instructions       Patient to follow up with Dr. Lawson on 7/24/17 as previously scheduled            Discharge Instructions - Lifting restrictions       Lifting Restrictions 15 pounds until seen at Post-op follow up appointment                  Your next 10 appointments already scheduled     Jul 24, 2017 12:15 PM CDT   (Arrive by 12:00 PM)   Return Visit with Jacques Lawson MD   [facility] ([facility])    [address] [place] [address] [phone]              Further instructions from your care team       [facility]  Home Care Following Anesthesia  For 24 hours after surgery:  1. Get  plenty of rest.  A responsible adult must stay with you for at least 24 hours after you leave the surgery center.  2. Do not drive or use heavy equipment.  If you have weakness or tingling, don't drive or use heavy equipment until this feeling goes away.   3. Do not drink alcohol.   4. Avoid strenuous or risky activities.  Ask for help when climbing stairs.  5. You may feel lightheaded.  IF so, sit for a few minutes before standing.  Have someone help you get up.   6. If you have nausea (feel sick to your stomach): Drink only clear liquids such as apple juice, ginger ale, broth or 7-Up.  Rest may also help.  Be sure to drink enough fluids.  Move to a regular diet as you feel able.   7. You may have a slight fever.  Call the doctor if your fever is over 100 F (37.7 C) (taken under the tongue) or lasts longer than 24 hours.  8. You may have a dry mouth, a sore throat, muscle aches or trouble sleeping. These should go away after 24 hours.  9. Do not make important or legal decisions.          Tips for taking pain medications  To get the best pain relief possible, remember these points:    Take pain medications as directed, before pain becomes severe.    Pain medication can upset your stomach: taking it with food may help.    Constipation is a common side effect of pain medication. Drink plenty of  fluids.    Eat foods high in fiber. Take a stool softener if recommended by your doctor or pharmacist.    Do not drink alcohol, drive or operate machinery while taking pain medications.    Ask about other ways to control pain, such as with heat, ice or relaxation.    Call a doctor for any of the followin. Signs of infection (fever, growing tenderness at the surgery site, a large amount of drainage or bleeding, severe pain, foul-smelling drainage, redness, swelling).  2. It has been over 8 to 10 hours since surgery and you are still not able to urinate (pass water).  3. Headache for over 24 hours.  4. Numbness, tingling  "or weakness the day after surgery (if you had spinal anesthesia).    Your doctor is:  Dr. Jacques Lawson, ENT Otolaryngology: [phone]  Or dial [phone] and ask for the resident on call for:  ENT Otolaryngology  For emergency care, call the:  [facility]:  [phone] (TTY for hearing impaired: [phone])                Pending Results     No orders found from 7/8/2017 to 7/11/2017.            Admission Information     Date & Time Provider Department Dept. Phone    7/10/2017 Jacques Lawson MD [facility] [phone]      Your Vitals Were     Blood Pressure Pulse Temperature Respirations Height Weight    126/74 79 98.2  F (36.8  C) (Oral) 16 1.575 m (5' 2\") 75.3 kg (166 lb)    Pulse Oximetry BMI (Body Mass Index)                97% 30.36 kg/m2          SensingStrip Information     SensingStrip gives you secure access to your electronic health record. If you see a primary care provider, you can also send messages to your care team and make appointments. If you have questions, please call your primary care clinic.  If you do not have a primary care provider, please call [phone] and they will assist you.      SensingStrip is an electronic gateway that provides easy, online access to your medical records. With SensingStrip, you can request a clinic appointment, read your test results, renew a prescription or communicate with your care team.     To access your existing account, please contact your [facility] or call [phone] for assistance.        Care EveryWhere ID     This is your Care EveryWhere ID. This could be used by other organizations to access your [facility] medical records  [id number]        Equal Access to Services     ANDERSON DARNELL : Tonny Dodd, hilda may, héctor begum. So [facility] [phone].    ATENCIÓN: Si habla español, tiene a myers disposición servicios " olga de asistencia lingüística. Lulu serra [phone].    We comply with applicable federal civil rights laws and [place] laws. We do not discriminate on the basis of race, color, national origin, age, disability sex, sexual orientation or gender identity.               Review of your medicines      START taking        Dose / Directions    HYDROcodone-acetaminophen 7.5-325 MG/15ML solution   Commonly known as:  LORTAB   Used for:  S/P tonsillectomy        Dose:  10 mL   Take 10 mLs by mouth every 4 hours as needed for pain (moderate to severe)   Quantity:  300 mL   Refills:  0       ofloxacin 0.3 % otic solution   Commonly known as:  FLOXIN   Used for:  S/P tympanostomy tube placement        Dose:  10 drop   Place 10 drops into both ears 2 times daily for 5 days   Quantity:  5 mL   Refills:  0         CONTINUE these medicines which have NOT CHANGED        Dose / Directions    medroxyPROGESTERone 150 MG/ML injection   Commonly known as:  DEPO-PROVERA   Used for:  Encounter for initial prescription of injectable contraceptive        Dose:  150 mg   Inject 1 mL (150 mg) into the muscle every 3 months   Quantity:  1 mL   Refills:  3            Where to get your medicines      These medications were sent to [place], [place] - [address] [facility] [address] [place] [address]    Hours:  TRANSPLANT PHONE NUMBER [phone] Phone:  [phone]     ofloxacin 0.3 % otic solution         Some of these will need a paper prescription and others can be bought over the counter. Ask your nurse if you have questions.     Bring a paper prescription for each of these medications     HYDROcodone-acetaminophen 7.5-325 MG/15ML solution                Protect others around you: Learn how to safely use, store and throw away your medicines at www.disposemymeds.org.             Medication List: This is a list of all your medications and when to take them. Check marks  below indicate your daily home schedule. Keep this list as a reference.      Medications           Morning Afternoon Evening Bedtime As Needed    HYDROcodone-acetaminophen 7.5-325 MG/15ML solution   Commonly known as:  LORTAB   Take 10 mLs by mouth every 4 hours as needed for pain (moderate to severe)                                medroxyPROGESTERone 150 MG/ML injection   Commonly known as:  DEPO-PROVERA   Inject 1 mL (150 mg) into the muscle every 3 months                                ofloxacin 0.3 % otic solution   Commonly known as:  FLOXIN   Place 10 drops into both ears 2 times daily for 5 days

## 2017-07-10 NOTE — ANESTHESIA CARE TRANSFER NOTE
Patient: Mary Lerma    Procedure(s):  Bilateral Pressure Equalization Tubes placement and Bilateral Tonsillectomy - Wound Class: II-Clean Contaminated   - Wound Class: II-Clean Contaminated    Diagnosis: Tonsil Hypertrophy, Chronic Strep, Otitis Media  Diagnosis Additional Information: No value filed.    Anesthesia Type:   General, RSI     Note:  Airway :Room Air  Patient transferred to:PACU  Comments: Arrive PACU, Stable, Airway Intact  135/74, 90,16,96,98.9  All questions answered.      Vitals: (Last set prior to Anesthesia Care Transfer)    CRNA VITALS  7/10/2017 1513 - 7/10/2017 1548      7/10/2017             EKG: Sinus tachycardia                Electronically Signed By: SUSAN Barba CRNA  July 10, 2017  3:48 PM

## 2017-07-10 NOTE — ANESTHESIA PREPROCEDURE EVALUATION
Physical Exam  Normal systems: cardiovascular and pulmonary    Airway   Mallampati: II  Neck ROM: full    Dental   (+) chipped    Cardiovascular   Rhythm and rate: regular and normal      Pulmonary    breath sounds clear to auscultation                    Anesthesia Plan      History & Physical Review  History and physical reviewed and following examination; no interval change.    ASA Status:  2 .    NPO Status:  > 6 hours    Plan for General and RSI with Intravenous and Propofol induction. Maintenance will be Balanced.    PONV prophylaxis:  Ondansetron (or other 5HT-3) and Dexamethasone or Solumedrol       Postoperative Care  Postoperative pain management:  IV analgesics, Oral pain medications and Multi-modal analgesia.      Consents  Anesthetic plan, risks, benefits and alternatives discussed with:  Patient..          ANESTHESIA PREOP EVALUATION    PROCEDURE: Procedure(s):  Bilateral Pressure Equalization Tubes and Tonsillectomy - Wound Class: II-Clean Contaminated   - Wound Class: II-Clean Contaminated    HPI: Mary Lerma is a 31 year old female who presents for above procedure.    PAST MEDICAL HISTORY:    History reviewed. No pertinent past medical history.    PAST SURGICAL HISTORY:    Past Surgical History:   Procedure Laterality Date     CHOLECYSTECTOMY       GI SURGERY  2015    gastric sleeve     ORTHOPEDIC SURGERY         PAST ANESTHESIA HISTORY:     No personal or family h/o anesthesia problems    SOCIAL HISTORY:       Social History   Substance Use Topics     Smoking status: Former Smoker     Types: Cigarettes     Quit date: 2/9/2017     Smokeless tobacco: Never Used     Alcohol use No       ALLERGIES:     Allergies   Allergen Reactions     Strattera [Atomoxetine] Hives       MEDICATIONS:       (Not in a hospital admission)    Current Outpatient Prescriptions   Medication Sig Dispense Refill     HYDROcodone-acetaminophen (LORTAB) 7.5-325 MG/15ML solution Take 10 mLs by mouth every 4 hours as  needed for pain (moderate to severe) 300 mL 0     ofloxacin (FLOXIN) 0.3 % otic solution Place 10 drops into both ears 2 times daily for 5 days 5 mL 0     medroxyPROGESTERone (DEPO-PROVERA) 150 MG/ML injection Inject 1 mL (150 mg) into the muscle every 3 months 1 mL 3       Current Outpatient Prescriptions Ordered in [facility]   Medication Sig Dispense Refill     HYDROcodone-acetaminophen (LORTAB) 7.5-325 MG/15ML solution Take 10 mLs by mouth every 4 hours as needed for pain (moderate to severe) 300 mL 0     ofloxacin (FLOXIN) 0.3 % otic solution Place 10 drops into both ears 2 times daily for 5 days 5 mL 0     medroxyPROGESTERone (DEPO-PROVERA) 150 MG/ML injection Inject 1 mL (150 mg) into the muscle every 3 months 1 mL 3     Current Facility-Administered Medications Ordered in Epic   Medication Dose Route Frequency Provider Last Rate Last Dose     ceFAZolin (ANCEF) intermittent infusion 2 g in dextrose PREMIX  2 g Intravenous Pre-Op/Pre-procedure x 1 dose Jacques Lawson MD         lidocaine 1 % 1 mL  1 mL Other Q1H PRN Jonh Hong MD         lidocaine (LMX4) kit   Topical Q1H PRN Jonh Hong MD         sodium chloride (PF) 0.9% PF flush 3 mL  3 mL Intracatheter Q1H PRN Jonh Hong MD         lactated ringers infusion   Intravenous Continuous Jonh Hong MD 25 mL/hr at 07/10/17 1349         PHYSICAL EXAM:    Vitals: T 98.2, P 79, /74, R 16, SpO2 97%, Weight   Wt Readings from Last 2 Encounters:   07/10/17 75.3 kg (166 lb)   17 77.1 kg (169 lb 14.4 oz)       See doc flowsheet    Temp: 36.8  C (98.2  F) Temp  Min: 36.8  C (98.2  F)  Max: 36.8  C (98.2  F)  Resp: 16 Resp  Min: 16  Max: 16  SpO2: 97 % SpO2  Min: 97 %  Max: 97 %  Pulse: 79 Pulse  Min: 79  Max: 79    No Data Recorded  BP: 126/74 Systolic (24hrs), Av , Min:126 , Max:126   Diastolic (24hrs), Av, Min:74, Max:74      NPO STATUS: see doc flowsheet    LABS:    BMP:  Recent Labs   Lab Test  17   1202   NA  143   POTASSIUM  4.2    CHLORIDE  110*   CO2  26   BUN  12   CR  0.60   GLC  125*   RONNY  8.7       LFTs:   Recent Labs   Lab Test  05/17/17   1202   PROTTOTAL  7.1   ALBUMIN  3.8   BILITOTAL  0.2   ALKPHOS  66   AST  14   ALT  18       CBC:   Recent Labs   Lab Test  07/07/17   0822  05/17/17   1202   WBC   --   8.4   RBC   --   4.83   HGB  14.2  14.6   HCT   --   43.2   MCV   --   89   MCH   --   30.2   MCHC   --   33.8   RDW   --   13.0   PLT   --   254       Coags:  No results for input(s): INR, PTT, FIBR in the last 10484 hours.    Imaging:  No orders to display       Jonh Hong MD  Anesthesiology Staff  Pager [phone]    7/10/2017  2:37 PM                  .

## 2017-07-10 NOTE — BRIEF OP NOTE
[facility]    Brief Operative Note    Pre-operative diagnosis: Tonsil Hypertrophy, Chronic Strep, Otitis Media  Post-operative diagnosis Tonsil Hypertrophy, Chronic Strep, Otitis Media  Procedure: Procedure(s):  Bilateral Pressure Equalization Tubes placement and Bilateral Tonsillectomy - Wound Class: II-Clean Contaminated   - Wound Class: II-Clean Contaminated  Surgeon: Surgeon(s) and Role:     * Jacques Lawson MD - Primary  Anesthesia: General   Estimated blood loss: 5 mL  Drains: None  Specimens:   ID Type Source Tests Collected by Time Destination   A : right tonsil Organ Tonsil, Right SURGICAL PATHOLOGY EXAM Jacques Lawson MD 7/10/2017  3:20 PM    B : Left tonsil Organ Tonsil, Left SURGICAL PATHOLOGY EXAM Jacques Lawson MD 7/10/2017  3:22 PM      Findings:   Bilateral middle ear clear without effusion. Bilateral tonsillary hypertrophies with tonsilliths   Complications: None.  Implants: None.

## 2017-07-10 NOTE — DISCHARGE INSTRUCTIONS
[facility]  Home Care Following Anesthesia  For 24 hours after surgery:  1. Get plenty of rest.  A responsible adult must stay with you for at least 24 hours after you leave the surgery center.  2. Do not drive or use heavy equipment.  If you have weakness or tingling, don't drive or use heavy equipment until this feeling goes away.   3. Do not drink alcohol.   4. Avoid strenuous or risky activities.  Ask for help when climbing stairs.  5. You may feel lightheaded.  IF so, sit for a few minutes before standing.  Have someone help you get up.   6. If you have nausea (feel sick to your stomach): Drink only clear liquids such as apple juice, ginger ale, broth or 7-Up.  Rest may also help.  Be sure to drink enough fluids.  Move to a regular diet as you feel able.   7. You may have a slight fever.  Call the doctor if your fever is over 100 F (37.7 C) (taken under the tongue) or lasts longer than 24 hours.  8. You may have a dry mouth, a sore throat, muscle aches or trouble sleeping. These should go away after 24 hours.  9. Do not make important or legal decisions.          Tips for taking pain medications  To get the best pain relief possible, remember these points:    Take pain medications as directed, before pain becomes severe.    Pain medication can upset your stomach: taking it with food may help.    Constipation is a common side effect of pain medication. Drink plenty of  fluids.    Eat foods high in fiber. Take a stool softener if recommended by your doctor or pharmacist.    Do not drink alcohol, drive or operate machinery while taking pain medications.    Ask about other ways to control pain, such as with heat, ice or relaxation.    Call a doctor for any of the followin. Signs of infection (fever, growing tenderness at the surgery site, a large amount of drainage or bleeding, severe pain, foul-smelling drainage, redness, swelling).  2. It has been over 8 to 10 hours  since surgery and you are still not able to urinate (pass water).  3. Headache for over 24 hours.  4. Numbness, tingling or weakness the day after surgery (if you had spinal anesthesia).    Your doctor is:  Dr. Jacques Lawson, ENT Otolaryngology: [phone]  Or dial [phone] and ask for the resident on call for:  ENT Otolaryngology  For emergency care, call the:  [facility]:  [phone] (TTY for hearing impaired: [phone])

## 2017-07-11 NOTE — OP NOTE
DATE OF PROCEDURE: 07/10/17     PREOPERATIVE DIAGNOSIS: Recurrent tonsillitis.     POSTOPERATIVE DIAGNOSIS: Recurrent tonsillitis.     PROCEDURES PERFORMED:   1. Bilateral PE tube placement  2. Tonsillectomy    STAFF SURGEON: Jacques Lawson MD   ASSISTANT SURGEON: Venkat Sales MD     INDICATIONS: Ms. Mary Lerma is a 31 year old female with a history of recurrent strep tonsillitis and otitis media required multiple course of antibiotics. After a detailed discussion of risks and benefit it was elected to proceed with above procedures.    FINDINGS:   1. No effusion in middle ear bilaterally  2. 2+ bilaterally with crypts and tonsillith    DESCRIPTION OF PROCEDURE: After properly identifying patient and obtaining a signed informed consent, the patient was brought to the OR. The patient was placed in the supine position on the operating table. General anesthesia was induced and orotracheal intubation was performed by the Anesthesia staff. The operative table was turned 90 degrees to facilitate the procedure. Patient was preped and draped with the usual clean fashion. A timeout was performed immediately before the procedure.    The operative microscope was brought into the field. The right ear was examined. The external auditory canal was cleaned. An anterior inferior radial incision was made at the TM. The middle ear cleft was suctioned and noted to be free of effusion. A pressure equalizing tube was placed through the incision. A similar procedure was performed to the left ear.    Tonsillectomy was performed next. A Tori mouth gag was placed to expose patient's oropharynx. The tonsils were noted to be 2+ bilaterally with crypts and tonsillith. The right tonsil was medialized with a curved allis clamp. The tonsil was dissected away from the tonsillar fossa along the tonsillar capsule with a Bovie. Hemostasis was achieved with suction cautery. A similar procedure was performed on the left tonsil. Patient's oropharynx was  irrigated with copious amount of normal saline and suctioned out. 0.25% Marcaine was then applied to the tonsillar fossa for local anesthesia.    This concludes the procedure. The patient was then turned back to Anesthesia staff for waking from anesthesia and was extubated without complication. Dr. Lawson was present in all parts of the procedure.    ESTIMATED BLOOD LOSS: 5 mL     COMPLICATIONS: None.     IMPLANTS: None     SPECIMENS: Left and right tonsil     DISPOSITION: The patient was transferred to PACU in stable condition. The patient will be discharged home when recovered from anesthesia. He will be followed up in clinic in 2-3 week.     Physician Attestation   I was present for the entire procedure between opening and closing.    Jacques Lawson

## 2017-07-12 DIAGNOSIS — G89.18 POST-TONSILLECTOMY PAIN: Primary | ICD-10-CM

## 2017-07-12 DIAGNOSIS — Z90.89 POST-TONSILLECTOMY PAIN: Primary | ICD-10-CM

## 2017-07-12 LAB — COPATH REPORT: NORMAL

## 2017-07-12 RX ORDER — OXYCODONE HCL 5 MG/5 ML
SOLUTION, ORAL ORAL
Qty: 600 ML | Refills: 0 | Status: SHIPPED | OUTPATIENT
Start: 2017-07-12 | End: 2017-07-24

## 2017-07-24 ENCOUNTER — OFFICE VISIT (OUTPATIENT)
Dept: OTOLARYNGOLOGY | Facility: CLINIC | Age: 32
End: 2017-07-24

## 2017-07-24 DIAGNOSIS — Z90.89 POST-TONSILLECTOMY PAIN: ICD-10-CM

## 2017-07-24 DIAGNOSIS — G89.18 POST-TONSILLECTOMY PAIN: ICD-10-CM

## 2017-07-24 DIAGNOSIS — Z90.89 S/P TONSILLECTOMY: ICD-10-CM

## 2017-07-24 RX ORDER — OXYCODONE HCL 5 MG/5 ML
SOLUTION, ORAL ORAL
Qty: 200 ML | Refills: 0 | Status: SHIPPED | OUTPATIENT
Start: 2017-07-24 | End: 2017-08-18

## 2017-07-24 RX ORDER — HYDROCODONE BITARTRATE AND ACETAMINOPHEN 7.5; 325 MG/15ML; MG/15ML
10 SOLUTION ORAL EVERY 4 HOURS PRN
Qty: 200 ML | Refills: 0 | Status: SHIPPED | OUTPATIENT
Start: 2017-07-24 | End: 2017-07-24

## 2017-07-24 ASSESSMENT — PAIN SCALES - GENERAL: PAINLEVEL: SEVERE PAIN (6)

## 2017-07-24 NOTE — PATIENT INSTRUCTIONS
Please follow up in about 3 months to see Dr Lawson with an audiogram.   Ubaldo Maguire RN  [phone]

## 2017-07-24 NOTE — LETTER
7/24/2017       RE: Mary Lerma  [address]  [facility] [address]     Dear Colleague,    Thank you for referring your patient, Mray Lerma, to the [facility] at [facility]. Please see a copy of my visit note below.    Mary is here to see us again. She is still having some pain in her throat but it is improving. She does have some discomfort in her left ear feels that there is some thinning still there that needs to pop.    Examination of her mouth shows well-healing oral pharynx, examination of the ears show the tubes in place and patent bilaterally.    Plan is for follow-up again in 3 weeks will have recent drops in her left ear which is bothersome audiogram and follow-up.    Jacques Lawson      Again, thank you for allowing me to participate in the care of your patient.      Sincerely,    Jacques Lawson MD

## 2017-07-24 NOTE — NURSING NOTE
Chief Complaint   Patient presents with     RECHECK     Return Post op 7/10/2017 Tonsillectomy and PE tubes      Pt states Left ear and throat of 6 today.    TRENTON Cruz LPN

## 2017-07-24 NOTE — MR AVS SNAPSHOT
After Visit Summary   7/24/2017    Mary Lerma    MRN: [id number]           Patient Information     Date Of Birth          1985        Visit Information        Provider Department      7/24/2017 12:15 PM Jacques Lawson MD [facility] Th[facility]        Today's Diagnoses     S/P tonsillectomy        Post-tonsillectomy pain           Follow-ups after your visit        Your next 10 appointments already scheduled     Jul 24, 2017 12:15 PM CDT   (Arrive by 12:00 PM)   Return Visit with MD KARL Kaplan [facility] ([facility] [place])    [address] [address] [phone]              Who to contact     Please call your clinic at [phone] to:    Ask questions about your health    Make or cancel appointments    Discuss your medicines    Learn about your test results    Speak to your doctor   If you have compliments or concerns about an experience at your clinic, or if you wish to file a complaint, please contact [facility] Patient Relations at [phone] or email us at [email or web address]         Additional Information About Your Visit        MyChart Information     [employer] gives you secure access to your electronic health record. If you see a primary care provider, you can also send messages to your care team and make appointments. If you have questions, please call your primary care clinic.  If you do not have a primary care provider, please call [phone] and they will assist you.      Or[employer] is an electronic gateway that provides easy, online access to your medical records. With [employer], you can request a clinic appointment, read your test results, renew a prescription or communicate with your care team.     To access your existing account, please contact your [facility] or call [phone] for assistance.        Care EveryWhere ID      This is your Care EveryWhere ID. This could be used by other organizations to access your [place] medical records  [id number]         Blood Pressure from Last 3 Encounters:   07/10/17 128/72   07/07/17 110/60   06/01/17 132/72    Weight from Last 3 Encounters:   07/10/17 75.3 kg (166 lb)   07/07/17 77.1 kg (169 lb 14.4 oz)   06/26/17 77.1 kg (170 lb)              Today, you had the following     No orders found for display         Today's Medication Changes          These changes are accurate as of: 7/24/17 12:02 PM.  If you have any questions, ask your nurse or doctor.               Stop taking these medicines if you haven't already. Please contact your care team if you have questions.     HYDROcodone-acetaminophen 7.5-325 MG/15ML solution   Commonly known as:  LORTAB   Stopped by:  Jacques Lawson MD                Where to get your medicines      Some of these will need a paper prescription and others can be bought over the counter.  Ask your nurse if you have questions.     Bring a paper prescription for each of these medications     oxyCODONE 5 MG/5ML solution                Primary Care Provider Office Phone # Fax #    Eelna Mcdonough [phone]       [facility] [id number] [place] [address]        Equal Access to Services     ANDERSON DARNELL AH: Hadii adri ku hadasho Soomaali, waaxda luqadaha, qaybta kaalmada adeegyada, héctor yanez. So [facility] [phone].    ATENCIÓN: Si habla español, tiene a myers disposición servicios gratuitos de asistencia lingüística. Llame al [phone].    We comply with applicable federal civil rights laws and [place] laws. We do not discriminate on the basis of race, color, national origin, age, disability sex, sexual orientation or gender identity.            Thank you!     Thank you for choosing [facility]  for your care. Our goal is always to provide you with excellent care. Hearing back from our  patients is one way we can continue to improve our services. Please take a few minutes to complete the written survey that you may receive in the mail after your visit with us. Thank you!             Your Updated Medication List - Protect others around you: Learn how to safely use, store and throw away your medicines at www.disposemymeds.org.          This list is accurate as of: 7/24/17 12:02 PM.  Always use your most recent med list.                   Brand Name Dispense Instructions for use Diagnosis    medroxyPROGESTERone 150 MG/ML injection    DEPO-PROVERA    1 mL    Inject 1 mL (150 mg) into the muscle every 3 months    Encounter for initial prescription of injectable contraceptive       ofloxacin 0.3 % otic solution    FLOXIN    5 mL    Place 5 drops into both ears 2 times daily    S/P tympanostomy tube placement       oxyCODONE 5 MG/5ML solution    ROXICODONE    200 mL    5-10 ml orally every 4 hours as needed for pain post-tonsillectomy    Post-tonsillectomy pain

## 2017-07-24 NOTE — PROGRESS NOTES
Mary is here to see us again. She is still having some pain in her throat but it is improving. She does have some discomfort in her left ear feels that there is some thinning still there that needs to pop.    Examination of her mouth shows well-healing oral pharynx, examination of the ears show the tubes in place and patent bilaterally.    Plan is for follow-up again in 3 weeks will have recent drops in her left ear which is bothersome audiogram and follow-up.    Jacques Lawson

## 2017-08-14 ENCOUNTER — TELEPHONE (OUTPATIENT)
Dept: OTOLARYNGOLOGY | Facility: CLINIC | Age: 32
End: 2017-08-14

## 2017-08-14 NOTE — TELEPHONE ENCOUNTER
Left message for patient to shalini back when she is available.   [For DSM-IV Criteria: need five or more symptoms for at least two weeks. One of the symptoms must be depressed mood or loss of interest].  The patient has the following symptoms:    Depressed mood

## 2017-08-18 ENCOUNTER — OFFICE VISIT (OUTPATIENT)
Dept: FAMILY MEDICINE | Facility: CLINIC | Age: 32
End: 2017-08-18
Payer: COMMERCIAL

## 2017-08-18 VITALS
HEIGHT: 62 IN | HEART RATE: 80 BPM | TEMPERATURE: 98.4 F | WEIGHT: 169 LBS | DIASTOLIC BLOOD PRESSURE: 60 MMHG | BODY MASS INDEX: 31.1 KG/M2 | SYSTOLIC BLOOD PRESSURE: 90 MMHG

## 2017-08-18 DIAGNOSIS — G43.809 OTHER MIGRAINE WITHOUT STATUS MIGRAINOSUS, NOT INTRACTABLE: Primary | ICD-10-CM

## 2017-08-18 DIAGNOSIS — Z30.42 ENCOUNTER FOR SURVEILLANCE OF INJECTABLE CONTRACEPTIVE: ICD-10-CM

## 2017-08-18 PROCEDURE — 99214 OFFICE O/P EST MOD 30 MIN: CPT | Mod: 25 | Performed by: FAMILY MEDICINE

## 2017-08-18 PROCEDURE — 96372 THER/PROPH/DIAG INJ SC/IM: CPT | Performed by: FAMILY MEDICINE

## 2017-08-18 RX ORDER — SUMATRIPTAN 6 MG/.5ML
6 INJECTION, SOLUTION SUBCUTANEOUS ONCE
Qty: 6 VIAL | Refills: 1 | OUTPATIENT
Start: 2017-08-18 | End: 2017-08-18

## 2017-08-18 RX ORDER — KETOROLAC TROMETHAMINE 30 MG/ML
30 INJECTION, SOLUTION INTRAMUSCULAR; INTRAVENOUS ONCE
Qty: 1 ML | Refills: 0 | OUTPATIENT
Start: 2017-08-18 | End: 2017-08-18

## 2017-08-18 RX ORDER — SUMATRIPTAN 100 MG/1
100 TABLET, FILM COATED ORAL
Qty: 18 TABLET | Refills: 1 | Status: SHIPPED | OUTPATIENT
Start: 2017-08-18

## 2017-08-18 NOTE — PROGRESS NOTES
SUBJECTIVE:   Mary Lerma is a 31 year old female who presents to clinic today for the following health issues:    Headaches      Duration: 10 days    Description  Location: bilateral in the temporal area   Character: throbbing pain, dull pain, sharp pain, squeezing pain  Frequency:  daily  Duration:  All day    Intensity:  8/10    Accompanying signs and symptoms:    Precipitating or Alleviating factors:  Nausea/vomiting: no  Dizziness: sometimes  Weakness or numbness: sometimes  Visual changes: none  Fever: no   Sinus or URI symptoms no     History    Recent tonsillectomy and ear tubes  Head trauma: no   Family history of migraines: YES- mom  Previous tests for headaches: no   Neurologist evaluations: no   Able to do daily activities when headache present: YES- no choice  Wake with headaches: YES  Daily pain medication use: YES  Any changes in: stressful job    Precipitating or Alleviating factors (light/sound/sleep/caffeine): lights and sounds    Therapies tried and outcome: not helping, Ibuprofen (Advil, Motrin), Tylenol and Excedrin    Outcome - effective percocet  Frequent/daily pain medication use: YES      Has occasional episodes of migraine HA that last days, never lasted 10 days however.   Recent changes include stress at work and more screen time. Has never been a good sleeper. No regular aerobic exercise.     Would be interested in discussing preventive HA medication in the near future. For now, wants to focus on clearing this episode.       Problem list and histories reviewed & adjusted, as indicated.  Additional history: as documented    Patient Active Problem List   Diagnosis     Overweight     Encounter for routine adult health examination without abnormal findings     Acute thoracic back pain, unspecified back pain laterality     Past Surgical History:   Procedure Laterality Date     CHOLECYSTECTOMY       GI SURGERY  2015    gastric sleeve     MYRINGOTOMY, INSERT TUBE BILATERAL, COMBINED  "Bilateral 7/10/2017    Procedure: COMBINED MYRINGOTOMY, INSERT TUBE BILATERAL;  Bilateral Pressure Equalization Tubes placement and Bilateral Tonsillectomy;  Surgeon: Jacques Lawson MD;  Location:  OR     ORTHOPEDIC SURGERY       TONSILLECTOMY Bilateral 7/10/2017    Procedure: TONSILLECTOMY;;  Surgeon: Jacques Lawson MD;  Location:  OR       Social History   Substance Use Topics     Smoking status: Former Smoker     Types: Cigarettes     Quit date: 2/9/2017     Smokeless tobacco: Never Used     Alcohol use No     Family History   Problem Relation Age of Onset     Family History Negative Mother      DIABETES Father      Hypertension Father              Reviewed and updated as needed this visit by clinical staff       Reviewed and updated as needed this visit by Provider         ROS:  Constitutional, HEENT, cardiovascular, pulmonary, gi and gu systems are negative, except as otherwise noted.      OBJECTIVE:   BP 90/60 (BP Location: Right arm, Patient Position: Sitting, Cuff Size: Adult Large)  Pulse 80  Temp 98.4  F (36.9  C) (Oral)  Ht 5' 2\" (1.575 m)  Wt 169 lb (76.7 kg)  Breastfeeding? No  BMI 30.91 kg/m2  Body mass index is 30.91 kg/(m^2).  GENERAL: healthy, alert and no distress  EYES: Eyes grossly normal to inspection, PERRL and conjunctivae and sclerae normal  HENT: ear canals and TM's normal, nose and mouth without ulcers or lesions  NECK: no adenopathy  RESP: lungs clear to auscultation - no rales, rhonchi or wheezes  CV: regular rate and rhythm, normal S1 S2, no S3 or S4, no murmur  NEURO: CN 2-12 intact, Normal strength and tone, mentation intact and speech normal    Diagnostic Test Results:  none     ASSESSMENT/PLAN:     1. Other migraine without status migrainosus, not intractable - improved with IM meds in clinic, will send imitrex to her pharmacy as abortive, suggested short office visit in the future to discuss preventive if she chooses.   - ketorolac (TORADOL) 30 MG/ML injection; Inject 1 mL " (30 mg) into the muscle once for 1 dose  Dispense: 1 mL; Refill: 0  - SUMAtriptan (IMITREX) 6 MG/0.5ML injection; Inject 0.5 mLs (6 mg) Subcutaneous once for 1 dose May repeat dose in 1 hour. Max 12 mg/24 hours.  Dispense: 6 vial; Refill: 1    2. Contraceptive management  - Medroxyprogesterone inj  1mg   (Depo Provera J-Code)  - INJECTION INTRAMUSCULAR OR SUB-Q    Berna Quiles MD  [facility]

## 2017-08-18 NOTE — NURSING NOTE
"Chief Complaint   Patient presents with     Headache       Initial BP 90/60 (BP Location: Right arm, Patient Position: Sitting, Cuff Size: Adult Large)  Pulse 80  Temp 98.4  F (36.9  C) (Oral)  Ht 5' 2\" (1.575 m)  Wt 169 lb (76.7 kg)  Breastfeeding? No  BMI 30.91 kg/m2 Estimated body mass index is 30.91 kg/(m^2) as calculated from the following:    Height as of this encounter: 5' 2\" (1.575 m).    Weight as of this encounter: 169 lb (76.7 kg).  Medication Reconciliation: complete     Daquan Rubio CMA          "

## 2017-08-18 NOTE — MR AVS SNAPSHOT
"              After Visit Summary   8/18/2017    Mary Lerma    MRN: [id number]           Patient Information     Date Of Birth          1985        Visit Information        Provider Department      8/18/2017 11:15 AM Berna Quiles MD [facility]        Today's Diagnoses     Other migraine without status migrainosus, not intractable    -  1    Encounter for surveillance of injectable contraceptive           Follow-ups after your visit        Who to contact     If you have questions or need follow up information about today's clinic visit or your schedule please contact [facility] directly at [phone].  Normal or non-critical lab and imaging results will be communicated to you by MyChart, letter or phone within 4 business days after the clinic has received the results. If you do not hear from us within 7 days, please contact the clinic through [employer] or phone. If you have a critical or abnormal lab result, we will notify you by phone as soon as possible.  Submit refill requests through [facility] or call your pharmacy and they will forward the refill request to us. Please allow 3 business days for your refill to be completed.          Additional Information About Your Visit        MyChart Information     [facility] gives you secure access to your electronic health record. If you see a primary care provider, you can also send messages to your care team and make appointments. If you have questions, please call your primary care clinic.  If you do not have a primary care provider, please call [phone] and they will assist you.        Care EveryWhere ID     This is your Care EveryWhere ID. This could be used by other organizations to access your [place] medical records  [id number]        Your Vitals Were     Pulse Temperature Height Breastfeeding? BMI (Body Mass Index)       80 98.4  F (36.9  C) (Oral) 5' 2\" (1.575 m) No 30.91 kg/m2        Blood Pressure from Last 3 " Encounters:   08/18/17 90/60   07/10/17 128/72   07/07/17 110/60    Weight from Last 3 Encounters:   08/18/17 169 lb (76.7 kg)   07/10/17 166 lb (75.3 kg)   07/07/17 169 lb 14.4 oz (77.1 kg)              We Performed the Following     INJECTION INTRAMUSCULAR OR SUB-Q     Medroxyprogesterone inj  1mg   (Depo Provera J-Code)          Today's Medication Changes          These changes are accurate as of: 8/18/17  3:40 PM.  If you have any questions, ask your nurse or doctor.               Start taking these medicines.        Dose/Directions    SUMAtriptan 100 MG tablet   Commonly known as:  IMITREX   Used for:  Other migraine without status migrainosus, not intractable   Started by:  Berna Quiles MD        Dose:  100 mg   Take 1 tablet (100 mg) by mouth at onset of headache for migraine May repeat in 2 hours. Max 2 tablets/24 hours.   Quantity:  18 tablet   Refills:  1         Stop taking these medicines if you haven't already. Please contact your care team if you have questions.     oxyCODONE 5 MG/5ML solution   Commonly known as:  ROXICODONE   Stopped by:  Berna Quiles MD                Where to get your medicines      These medications were sent to [facility] [address] [place] [address] [id number] [place] [address] AT [address]  [address] [facility] [address]     Phone:  [phone]     SUMAtriptan 100 MG tablet                Primary Care Provider Office Phone # Fax #    Elena Mcdonough [phone]       [facility] [address] [address] [facility] [address]        Equal Access to Services     ANDERSON DARNELL AH: Hadii adri mancia Sojhonny, waaxda luqadaha, qaybta kaalmada adeegyada, héctor yanez. So [facility] [phone].    ATENCIÓN: Si habla español, tiene a myers disposición servicios gratuitos de asistencia lingüística. Llame al [phone].    We comply with applicable federal civil rights laws and [place] laws. We do not  discriminate on the basis of race, color, national origin, age, disability sex, sexual orientation or gender identity.            Thank you!     Thank you for choosing [facility]  for your care. Our goal is always to provide you with excellent care. Hearing back from our patients is one way we can continue to improve our services. Please take a few minutes to complete the written survey that you may receive in the mail after your visit with us. Thank you!             Your Updated Medication List - Protect others around you: Learn how to safely use, store and throw away your medicines at www.disposemymeds.org.          This list is accurate as of: 8/18/17  3:40 PM.  Always use your most recent med list.                   Brand Name Dispense Instructions for use Diagnosis    medroxyPROGESTERone 150 MG/ML injection    DEPO-PROVERA    1 mL    Inject 1 mL (150 mg) into the muscle every 3 months    Encounter for initial prescription of injectable contraceptive       ofloxacin 0.3 % otic solution    FLOXIN    5 mL    Place 5 drops into both ears 2 times daily    S/P tympanostomy tube placement       SUMAtriptan 100 MG tablet    IMITREX    18 tablet    Take 1 tablet (100 mg) by mouth at onset of headache for migraine May repeat in 2 hours. Max 2 tablets/24 hours.    Other migraine without status migrainosus, not intractable

## 2017-08-18 NOTE — LETTER
My Migraine Action Plan      Date: 8/18/2017     My Name: Mary Lerma   YOB: 1985  My Pharmacy:    [facility] [address] [place], [place] - [address]  [facility] [place], [place] - [address]  WRITTEN PRESCRIPTION REQUESTED       My (Preventative) Control Medicine: none        My Rescue Medicine: imitred   My Doctor: Elena Mcdonough     My Clinic: [facility]  [address] [facility] [address] [phone]        GREEN ZONE = Good Control    My headache plan is working.   I can do what I need to do.           I WILL:     ? Keep managing my triggers.  ? Write in my migraine diary each time I have a headache.  ? Keep taking my preventive (controller) medicine daily.  ? Take my relief and rescue medicine as needed.             YELLOW ZONE = Not Enough Control    My headache plan isn t always working.   My headaches keep me from doing   some of the things I need to do.       I WILL:     ? Set goals to control my triggers and act on them.  ? Write in my migraine diary each time I have a headache and review it for                      patterns or new triggers.  ? Keep taking my preventive (controller) medicine daily.  ? Take my relief and rescue medicine as needed.  ? Call my doctor or clinic at if I stay in the Yellow Zone.             RED ZONE = Poor or No Control    My headache plan has  failed. I can t do anything  when I have one. My  medicines aren t working.           I WILL:   ? Set goals to control my triggers and act on them.  ? Write in my migraine diary each time I have a headache and review it for                      patterns or new triggers.  ? Keep taking my preventive (controller) medicine daily.  ? Take my relief and rescue medicine as needed.  ? Call my doctor or clinic or go to urgent care or an ER if I m having the worst                  headache of my life.  ? Call my doctor or clinic  or go to urgent care or an ER if my medicine doesn t work.  ? Let my doctor or clinic know within 2 weeks if I have gone to an urgent care or             emergency department.          Provider specific instructions:

## 2017-09-06 ENCOUNTER — OFFICE VISIT (OUTPATIENT)
Dept: FAMILY MEDICINE | Facility: CLINIC | Age: 32
End: 2017-09-06
Payer: COMMERCIAL

## 2017-09-06 VITALS
BODY MASS INDEX: 31.86 KG/M2 | SYSTOLIC BLOOD PRESSURE: 110 MMHG | HEART RATE: 85 BPM | TEMPERATURE: 98.9 F | WEIGHT: 173.1 LBS | OXYGEN SATURATION: 98 % | DIASTOLIC BLOOD PRESSURE: 60 MMHG | HEIGHT: 62 IN

## 2017-09-06 DIAGNOSIS — R51.9 TEMPORAL HEADACHE: Primary | ICD-10-CM

## 2017-09-06 PROBLEM — Z00.00 ENCOUNTER FOR ROUTINE ADULT HEALTH EXAMINATION WITHOUT ABNORMAL FINDINGS: Status: RESOLVED | Noted: 2017-05-17 | Resolved: 2017-09-06

## 2017-09-06 PROCEDURE — 96372 THER/PROPH/DIAG INJ SC/IM: CPT | Performed by: FAMILY MEDICINE

## 2017-09-06 PROCEDURE — 99213 OFFICE O/P EST LOW 20 MIN: CPT | Mod: 25 | Performed by: FAMILY MEDICINE

## 2017-09-06 RX ORDER — KETOROLAC TROMETHAMINE 30 MG/ML
60 INJECTION, SOLUTION INTRAMUSCULAR; INTRAVENOUS ONCE
Qty: 2 ML | Refills: 0 | OUTPATIENT
Start: 2017-09-06 | End: 2017-09-06

## 2017-09-06 RX ORDER — OXYCODONE AND ACETAMINOPHEN 5; 325 MG/1; MG/1
1 TABLET ORAL EVERY 6 HOURS PRN
Qty: 20 TABLET | Refills: 0 | Status: SHIPPED | OUTPATIENT
Start: 2017-09-06 | End: 2017-10-24

## 2017-09-06 NOTE — NURSING NOTE
"Per orders of Dr. Quiles, injection of Ketorolac given by Daquan Rubio. Patient instructed to remain in clinic for 15 minutes afterwards, and to report any adverse reaction to me immediately. Pt was advised to stay in clinic for 20 minutes. States. \" have to leave to  daughter from school    Daquan Rubio CMA          "

## 2017-09-06 NOTE — NURSING NOTE
"Chief Complaint   Patient presents with     Headache       Initial /60 (BP Location: Right arm, Patient Position: Chair, Cuff Size: Adult Large)  Pulse 85  Temp 98.9  F (37.2  C) (Oral)  Ht 5' 2\" (1.575 m)  Wt 173 lb 1.6 oz (78.5 kg)  SpO2 98%  Breastfeeding? No  BMI 31.66 kg/m2 Estimated body mass index is 31.66 kg/(m^2) as calculated from the following:    Height as of this encounter: 5' 2\" (1.575 m).    Weight as of this encounter: 173 lb 1.6 oz (78.5 kg).  Medication Reconciliation: complete   KENAN Arvizu      "

## 2017-09-06 NOTE — PROGRESS NOTES
SUBJECTIVE:   Mary Lerma is a 31 year old female who presents to clinic today for the following health issues:      Migraine Follow-Up    Headaches symptoms:  Same     Frequency: couple times a week      Duration of headaches: this headache started this morning     Able to do normal daily activities/work with migraines: No - not easy but still does     Rescue/Relief medication:sumatriptan (Imitrex)              Effectiveness: no relief    Preventative medication: None    Neurologic complications: No new stroke-like symptoms, loss of vision or speech, numbness or weakness    In the past 4 weeks, how often have you gone to Urgent Care or the emergency room because of your headaches?  0    Amount of exercise or physical activity: 2-3 days/week for an average of 45-60 minutes    Problems taking medications regularly: No    Medication side effects: none  Diet: regular (no restrictions)      Has needed imitrex 2 times since she was last seen, but now 2 times today. Woke with HA, has persisted, improved when laying down in the dark.     Notes typical HA start mild and progress, this HA started bad.     Started taking magnesium supplement daily, as instructed.     Working at current job past 2 months, no increase in stress or screen time since last visit, although she did have both at our last visit.     On depo, so no menstrual period.     No change in caffeine.       Problem list and histories reviewed & adjusted, as indicated.  Additional history: none    Patient Active Problem List   Diagnosis     Overweight     Acute thoracic back pain, unspecified back pain laterality     Encounter for surveillance of injectable contraceptive     Past Surgical History:   Procedure Laterality Date     CHOLECYSTECTOMY       GI SURGERY  2015    gastric sleeve     MYRINGOTOMY, INSERT TUBE BILATERAL, COMBINED Bilateral 7/10/2017    Procedure: COMBINED MYRINGOTOMY, INSERT TUBE BILATERAL;  Bilateral Pressure Equalization Tubes  "placement and Bilateral Tonsillectomy;  Surgeon: Jacques Lawson MD;  Location:  OR     ORTHOPEDIC SURGERY       TONSILLECTOMY Bilateral 7/10/2017    Procedure: TONSILLECTOMY;;  Surgeon: Jacques Lawson MD;  Location:  OR       Social History   Substance Use Topics     Smoking status: Former Smoker     Types: Cigarettes     Quit date: 2/9/2017     Smokeless tobacco: Never Used     Alcohol use No     Family History   Problem Relation Age of Onset     Family History Negative Mother      DIABETES Father      Hypertension Father              Reviewed and updated as needed this visit by clinical staffAllergies       Reviewed and updated as needed this visit by Provider         ROS:  Constitutional, HEENT, cardiovascular, pulmonary, gi and gu systems are negative, except as otherwise noted.      OBJECTIVE:   /60 (BP Location: Right arm, Patient Position: Chair, Cuff Size: Adult Large)  Pulse 85  Temp 98.9  F (37.2  C) (Oral)  Ht 5' 2\" (1.575 m)  Wt 173 lb 1.6 oz (78.5 kg)  SpO2 98%  Breastfeeding? No  BMI 31.66 kg/m2  Body mass index is 31.66 kg/(m^2).  GENERAL: healthy, alert and no distress  RESP: lungs clear to auscultation - no rales, rhonchi or wheezes  CV: regular rate and rhythm, normal S1 S2, no S3 or S4, no murmur, click or rub, no peripheral edema and peripheral pulses strong  NEURO: Normal strength and tone, mentation intact and speech normal  PSYCH: mentation appears normal, affect normal/bright    Diagnostic Test Results:  none     ASSESSMENT/PLAN:     1. Temporal headache - will trial toradol in clinic as this seemed to help with symptoms last visit. Gave short script for percocet for severe HA pain, try imitrex and NSAIDs first. Suggested Neurology consultation as she would like to get to root of HA symptoms. Also suggested 8 hours sleep, 150 minutes aerobic activity weekly.   - oxyCODONE-acetaminophen (PERCOCET) 5-325 MG per tablet; Take 1 tablet by mouth every 6 hours as needed for pain " maximum 3 tablet(s) per day  Dispense: 20 tablet; Refill: 0  - NEUROLOGY ADULT REFERRAL  - ketorolac (TORADOL) 60 MG/2ML SOLN injection; Inject 2 mLs (60 mg) into the muscle once for 1 dose  Dispense: 2 mL; Refill: 0  - KETOROLAC TROMETHAMINE 15MG  - INJECTION INTRAMUSCULAR OR SUB-Q      Berna Qiules MD  [facility]

## 2017-09-06 NOTE — MR AVS SNAPSHOT
After Visit Summary   9/6/2017    Mary Lerma    MRN: [id number]           Patient Information     Date Of Birth          1985        Visit Information        Provider Department      9/6/2017 3:45 PM Berna Quiles MD [facility]        Today's Diagnoses     Temporal headache    -  1       Follow-ups after your visit        Additional Services     NEUROLOGY ADULT REFERRAL       Your provider has referred you for the following:   Consult at [facility]: [facility] Neurology [facility] [phone]   [email or web address]    Please be aware that coverage of these services is subject to the terms and limitations of your health insurance plan.  Call member services at your health plan with any benefit or coverage questions.      Please bring the following with you to your appointment:    (1) Any X-Rays, CTs or MRIs which have been performed.  Contact the facility where they were done to arrange for  prior to your scheduled appointment.    (2) List of current medications  (3) This referral request   (4) Any documents/labs given to you for this referral                  Who to contact     If you have questions or need follow up information about today's clinic visit or your schedule please contact [facility] directly at [phone].  Normal or non-critical lab and imaging results will be communicated to you by MyChart, letter or phone within 4 business days after the clinic has received the results. If you do not hear from us within 7 days, please contact the clinic through MyChart or phone. If you have a critical or abnormal lab result, we will notify you by phone as soon as possible.  Submit refill requests through [facility] or call your pharmacy and they will forward the refill request to us. Please allow 3 business days for your refill to be completed.          Additional Information About Your Visit        MyChart  "Information     Page gives you secure access to your electronic health record. If you see a primary care provider, you can also send messages to your care team and make appointments. If you have questions, please call your primary care clinic.  If you do not have a primary care provider, please call [phone] and they will assist you.        Care EveryWhere ID     This is your Care EveryWhere ID. This could be used by other organizations to access your [place] medical records  [id number]        Your Vitals Were     Pulse Temperature Height Pulse Oximetry Breastfeeding? BMI (Body Mass Index)    85 98.9  F (37.2  C) (Oral) 5' 2\" (1.575 m) 98% No 31.66 kg/m2       Blood Pressure from Last 3 Encounters:   09/06/17 110/60   08/18/17 90/60   07/10/17 128/72    Weight from Last 3 Encounters:   09/06/17 173 lb 1.6 oz (78.5 kg)   08/18/17 169 lb (76.7 kg)   07/10/17 166 lb (75.3 kg)              We Performed the Following     INJECTION INTRAMUSCULAR OR SUB-Q     KETOROLAC TROMETHAMINE 15MG     NEUROLOGY ADULT REFERRAL          Today's Medication Changes          These changes are accurate as of: 9/6/17  4:52 PM.  If you have any questions, ask your nurse or doctor.               Start taking these medicines.        Dose/Directions    ketorolac 60 MG/2ML Soln injection   Commonly known as:  TORADOL   Used for:  Temporal headache   Started by:  Berna Quiles MD        Dose:  60 mg   Inject 2 mLs (60 mg) into the muscle once for 1 dose   Quantity:  2 mL   Refills:  0       oxyCODONE-acetaminophen 5-325 MG per tablet   Commonly known as:  PERCOCET   Used for:  Temporal headache   Started by:  Berna Quiles MD        Dose:  1 tablet   Take 1 tablet by mouth every 6 hours as needed for pain maximum 3 tablet(s) per day   Quantity:  20 tablet   Refills:  0            Where to get your medicines      Some of these will need a paper prescription and others can be bought over the counter.  Ask your nurse if you " have questions.     Bring a paper prescription for each of these medications     oxyCODONE-acetaminophen 5-325 MG per tablet       You don't need a prescription for these medications     ketorolac 60 MG/2ML Soln injection                Primary Care Provider Office Phone # Fax #    Elena McdonoughEDENILSON [phone]       [facility] [address] [address] [facility] [address]        Equal Access to Services     ANDERSON DARNELL : Hadii aad ku hadasho Soomaali, waaxda luqadaha, qaybta kaalmada adeegyada, waxay idiin hayaan adeeg kharash la'akiko . So [facility] [phone].    ATENCIÓN: Si habla espnina, tiene a myers disposición servicios gratuitos de asistencia lingüística. Nimaame al [phone].    We comply with applicable federal civil rights laws and [place] laws. We do not discriminate on the basis of race, color, national origin, age, disability sex, sexual orientation or gender identity.            Thank you!     Thank you for choosing [facility]  for your care. Our goal is always to provide you with excellent care. Hearing back from our patients is one way we can continue to improve our services. Please take a few minutes to complete the written survey that you may receive in the mail after your visit with us. Thank you!             Your Updated Medication List - Protect others around you: Learn how to safely use, store and throw away your medicines at www.disposemymeds.org.          This list is accurate as of: 9/6/17  4:52 PM.  Always use your most recent med list.                   Brand Name Dispense Instructions for use Diagnosis    ketorolac 60 MG/2ML Soln injection    TORADOL    2 mL    Inject 2 mLs (60 mg) into the muscle once for 1 dose    Temporal headache       medroxyPROGESTERone 150 MG/ML injection    DEPO-PROVERA    1 mL    Inject 1 mL (150 mg) into the muscle every 3 months    Encounter for initial prescription of injectable contraceptive       oxyCODONE-acetaminophen  5-325 MG per tablet    PERCOCET    20 tablet    Take 1 tablet by mouth every 6 hours as needed for pain maximum 3 tablet(s) per day    Temporal headache       SUMAtriptan 100 MG tablet    IMITREX    18 tablet    Take 1 tablet (100 mg) by mouth at onset of headache for migraine May repeat in 2 hours. Max 2 tablets/24 hours.    Other migraine without status migrainosus, not intractable

## 2017-09-07 ENCOUNTER — OFFICE VISIT (OUTPATIENT)
Dept: URGENT CARE | Facility: URGENT CARE | Age: 32
End: 2017-09-07
Payer: COMMERCIAL

## 2017-09-07 ENCOUNTER — APPOINTMENT (OUTPATIENT)
Dept: CT IMAGING | Facility: CLINIC | Age: 32
End: 2017-09-07
Attending: INTERNAL MEDICINE
Payer: COMMERCIAL

## 2017-09-07 ENCOUNTER — HOSPITAL ENCOUNTER (EMERGENCY)
Facility: CLINIC | Age: 32
Discharge: HOME | End: 2017-09-08
Attending: INTERNAL MEDICINE | Admitting: INTERNAL MEDICINE
Payer: COMMERCIAL

## 2017-09-07 VITALS
OXYGEN SATURATION: 100 % | BODY MASS INDEX: 31.83 KG/M2 | DIASTOLIC BLOOD PRESSURE: 85 MMHG | HEIGHT: 62 IN | WEIGHT: 173 LBS | TEMPERATURE: 98.9 F | HEART RATE: 63 BPM | SYSTOLIC BLOOD PRESSURE: 129 MMHG

## 2017-09-07 DIAGNOSIS — H92.02 EAR PAIN, LEFT: Primary | ICD-10-CM

## 2017-09-07 DIAGNOSIS — G43.801 OTHER MIGRAINE WITH STATUS MIGRAINOSUS, NOT INTRACTABLE: ICD-10-CM

## 2017-09-07 LAB
ANION GAP SERPL CALCULATED.3IONS-SCNC: 8 MMOL/L (ref 3–14)
BASOPHILS # BLD AUTO: 0.1 10E9/L (ref 0–0.2)
BASOPHILS NFR BLD AUTO: 0.4 %
BUN SERPL-MCNC: 12 MG/DL (ref 7–30)
CALCIUM SERPL-MCNC: 9 MG/DL (ref 8.5–10.1)
CHLORIDE SERPL-SCNC: 106 MMOL/L (ref 94–109)
CO2 SERPL-SCNC: 27 MMOL/L (ref 20–32)
CREAT SERPL-MCNC: 0.67 MG/DL (ref 0.52–1.04)
DIFFERENTIAL METHOD BLD: ABNORMAL
EOSINOPHIL # BLD AUTO: 0.2 10E9/L (ref 0–0.7)
EOSINOPHIL NFR BLD AUTO: 2 %
ERYTHROCYTE [DISTWIDTH] IN BLOOD BY AUTOMATED COUNT: 12.4 % (ref 10–15)
GFR SERPL CREATININE-BSD FRML MDRD: >90 ML/MIN/1.7M2
GLUCOSE SERPL-MCNC: 106 MG/DL (ref 70–99)
HCT VFR BLD AUTO: 40.5 % (ref 35–47)
HGB BLD-MCNC: 13.3 G/DL (ref 11.7–15.7)
IMM GRANULOCYTES # BLD: 0 10E9/L (ref 0–0.4)
IMM GRANULOCYTES NFR BLD: 0.4 %
LYMPHOCYTES # BLD AUTO: 3.6 10E9/L (ref 0.8–5.3)
LYMPHOCYTES NFR BLD AUTO: 31.6 %
MCH RBC QN AUTO: 29.2 PG (ref 26.5–33)
MCHC RBC AUTO-ENTMCNC: 32.8 G/DL (ref 31.5–36.5)
MCV RBC AUTO: 89 FL (ref 78–100)
MONOCYTES # BLD AUTO: 0.6 10E9/L (ref 0–1.3)
MONOCYTES NFR BLD AUTO: 5.4 %
NEUTROPHILS # BLD AUTO: 6.9 10E9/L (ref 1.6–8.3)
NEUTROPHILS NFR BLD AUTO: 60.2 %
NRBC # BLD AUTO: 0 10*3/UL
NRBC BLD AUTO-RTO: 0 /100
PLATELET # BLD AUTO: 267 10E9/L (ref 150–450)
POTASSIUM SERPL-SCNC: 3.9 MMOL/L (ref 3.4–5.3)
RBC # BLD AUTO: 4.56 10E12/L (ref 3.8–5.2)
SODIUM SERPL-SCNC: 141 MMOL/L (ref 133–144)
WBC # BLD AUTO: 11.4 10E9/L (ref 4–11)

## 2017-09-07 PROCEDURE — 99285 EMERGENCY DEPT VISIT HI MDM: CPT | Mod: 25

## 2017-09-07 PROCEDURE — 70450 CT HEAD/BRAIN W/O DYE: CPT

## 2017-09-07 PROCEDURE — 85025 COMPLETE CBC W/AUTO DIFF WBC: CPT | Performed by: INTERNAL MEDICINE

## 2017-09-07 PROCEDURE — 96374 THER/PROPH/DIAG INJ IV PUSH: CPT

## 2017-09-07 PROCEDURE — 99212 OFFICE O/P EST SF 10 MIN: CPT

## 2017-09-07 PROCEDURE — 96376 TX/PRO/DX INJ SAME DRUG ADON: CPT | Mod: 59

## 2017-09-07 PROCEDURE — 70498 CT ANGIOGRAPHY NECK: CPT

## 2017-09-07 PROCEDURE — 25000128 H RX IP 250 OP 636: Performed by: INTERNAL MEDICINE

## 2017-09-07 PROCEDURE — 80048 BASIC METABOLIC PNL TOTAL CA: CPT | Performed by: INTERNAL MEDICINE

## 2017-09-07 PROCEDURE — 96375 TX/PRO/DX INJ NEW DRUG ADDON: CPT

## 2017-09-07 RX ORDER — IOPAMIDOL 755 MG/ML
500 INJECTION, SOLUTION INTRAVASCULAR ONCE
Status: COMPLETED | OUTPATIENT
Start: 2017-09-07 | End: 2017-09-07

## 2017-09-07 RX ORDER — ONDANSETRON 2 MG/ML
4 INJECTION INTRAMUSCULAR; INTRAVENOUS ONCE
Status: COMPLETED | OUTPATIENT
Start: 2017-09-07 | End: 2017-09-07

## 2017-09-07 RX ORDER — HYDROMORPHONE HYDROCHLORIDE 1 MG/ML
0.5 INJECTION, SOLUTION INTRAMUSCULAR; INTRAVENOUS; SUBCUTANEOUS
Status: COMPLETED | OUTPATIENT
Start: 2017-09-07 | End: 2017-09-07

## 2017-09-07 RX ORDER — METOCLOPRAMIDE HYDROCHLORIDE 5 MG/ML
5 INJECTION INTRAMUSCULAR; INTRAVENOUS ONCE
Status: COMPLETED | OUTPATIENT
Start: 2017-09-07 | End: 2017-09-07

## 2017-09-07 RX ORDER — DIPHENHYDRAMINE HYDROCHLORIDE 50 MG/ML
25 INJECTION INTRAMUSCULAR; INTRAVENOUS ONCE
Status: COMPLETED | OUTPATIENT
Start: 2017-09-07 | End: 2017-09-07

## 2017-09-07 RX ORDER — DEXAMETHASONE SODIUM PHOSPHATE 10 MG/ML
10 INJECTION, SOLUTION INTRAMUSCULAR; INTRAVENOUS ONCE
Status: COMPLETED | OUTPATIENT
Start: 2017-09-07 | End: 2017-09-07

## 2017-09-07 RX ADMIN — SODIUM CHLORIDE 80 ML: 9 INJECTION, SOLUTION INTRAVENOUS at 22:12

## 2017-09-07 RX ADMIN — HYDROMORPHONE HYDROCHLORIDE 0.5 MG: 1 INJECTION, SOLUTION INTRAMUSCULAR; INTRAVENOUS; SUBCUTANEOUS at 23:47

## 2017-09-07 RX ADMIN — DEXAMETHASONE SODIUM PHOSPHATE 10 MG: 10 INJECTION, SOLUTION INTRAMUSCULAR; INTRAVENOUS at 23:49

## 2017-09-07 RX ADMIN — HYDROMORPHONE HYDROCHLORIDE 0.5 MG: 1 INJECTION, SOLUTION INTRAMUSCULAR; INTRAVENOUS; SUBCUTANEOUS at 22:28

## 2017-09-07 RX ADMIN — HYDROMORPHONE HYDROCHLORIDE 0.5 MG: 1 INJECTION, SOLUTION INTRAMUSCULAR; INTRAVENOUS; SUBCUTANEOUS at 21:37

## 2017-09-07 RX ADMIN — ONDANSETRON 4 MG: 2 INJECTION INTRAMUSCULAR; INTRAVENOUS at 22:27

## 2017-09-07 RX ADMIN — METOCLOPRAMIDE 5 MG: 5 INJECTION, SOLUTION INTRAMUSCULAR; INTRAVENOUS at 23:54

## 2017-09-07 RX ADMIN — DIPHENHYDRAMINE HYDROCHLORIDE 25 MG: 50 INJECTION, SOLUTION INTRAMUSCULAR; INTRAVENOUS at 23:48

## 2017-09-07 RX ADMIN — IOPAMIDOL 70 ML: 755 INJECTION, SOLUTION INTRAVENOUS at 22:12

## 2017-09-07 ASSESSMENT — ENCOUNTER SYMPTOMS
FEVER: 0
HEADACHES: 1

## 2017-09-07 NOTE — NURSING NOTE
"Chief Complaint   Patient presents with     Urgent Care     Headache     pt was in yesterday, got shot of toradol, HA went a way; COVINGTON came back last night, stronger and now has bleeding out of left ear       Initial /85 (BP Location: Right arm, Cuff Size: Adult Regular)  Pulse 63  Temp 98.9  F (37.2  C) (Oral)  Ht 5' 2\" (1.575 m)  Wt 173 lb (78.5 kg)  SpO2 100%  BMI 31.64 kg/m2 Estimated body mass index is 31.64 kg/(m^2) as calculated from the following:    Height as of this encounter: 5' 2\" (1.575 m).    Weight as of this encounter: 173 lb (78.5 kg).  Medication Reconciliation: complete     Alma Moser MA    "

## 2017-09-07 NOTE — MR AVS SNAPSHOT
"              After Visit Summary   9/7/2017    Mary Lerma    MRN: [id number]           Patient Information     Date Of Birth          1985        Visit Information        Provider Department      9/7/2017 5:00 PM Provider, [facility]        Today's Diagnoses     Ear pain, left    -  1       Follow-ups after your visit        Who to contact     If you have questions or need follow up information about today's clinic visit or your schedule please contact [facility] directly at [phone].  Normal or non-critical lab and imaging results will be communicated to you by [employer], letter or phone within 4 business days after the clinic has received the results. If you do not hear from us within 7 days, please contact the clinic through [employer] or phone. If you have a critical or abnormal lab result, we will notify you by phone as soon as possible.  Submit refill requests through [employer] or call your pharmacy and they will forward the refill request to us. Please allow 3 business days for your refill to be completed.          Additional Information About Your Visit        MyChart Information     [employer] gives you secure access to your electronic health record. If you see a primary care provider, you can also send messages to your care team and make appointments. If you have questions, please call your primary care clinic.  If you do not have a primary care provider, please call [phone] and they will assist you.        Care EveryWhere ID     This is your Care EveryWhere ID. This could be used by other organizations to access your [place] medical records  [id number]        Your Vitals Were     Pulse Temperature Height Pulse Oximetry BMI (Body Mass Index)       63 98.9  F (37.2  C) (Oral) 5' 2\" (1.575 m) 100% 31.64 kg/m2        Blood Pressure from Last 3 Encounters:   09/07/17 129/85   09/06/17 110/60   08/18/17 90/60    Weight from Last 3 Encounters: "   09/07/17 173 lb (78.5 kg)   09/06/17 173 lb 1.6 oz (78.5 kg)   08/18/17 169 lb (76.7 kg)              Today, you had the following     No orders found for display       Primary Care Provider Office Phone # Fax #    Elena Mcdonough [phone]       [facility] [address] [address] [address]        Equal Access to Services     ANDERSON DARNELL : Hadii aad ku hadasho Soomaali, waaxda luqadaha, qaybta kaalmada adeegyada, waxay idiin hayaan adeeg kharash la'aan . So [facility] [phone].    ATENCIÓN: Si habla español, tiene a myers disposición servicios gratuitos de asistencia lingüística. [facility] [phone].    We comply with applicable federal civil rights laws and [place] laws. We do not discriminate on the basis of race, color, national origin, age, disability sex, sexual orientation or gender identity.            Thank you!     Thank you for choosing [facility]  for your care. Our goal is always to provide you with excellent care. Hearing back from our patients is one way we can continue to improve our services. Please take a few minutes to complete the written survey that you may receive in the mail after your visit with us. Thank you!             Your Updated Medication List - Protect others around you: Learn how to safely use, store and throw away your medicines at www.disposemymeds.org.          This list is accurate as of: 9/7/17  6:22 PM.  Always use your most recent med list.                   Brand Name Dispense Instructions for use Diagnosis    medroxyPROGESTERone 150 MG/ML injection    DEPO-PROVERA    1 mL    Inject 1 mL (150 mg) into the muscle every 3 months    Encounter for initial prescription of injectable contraceptive       oxyCODONE-acetaminophen 5-325 MG per tablet    PERCOCET    20 tablet    Take 1 tablet by mouth every 6 hours as needed for pain maximum 3 tablet(s) per day    Temporal headache       SUMAtriptan 100 MG tablet    IMITREX    18  tablet    Take 1 tablet (100 mg) by mouth at onset of headache for migraine May repeat in 2 hours. Max 2 tablets/24 hours.    Other migraine without status migrainosus, not intractable

## 2017-09-07 NOTE — PROGRESS NOTES
"SUBJECTIVE:  Mary Lerma is a 31 year old female here for eval of ongoing  left sided headache for 2 days. States that she was seen here yesterday and treated for headache with shot of toradol. Woke up with bad headache and vomiting. Took Tylenol with no relief of pain. Also a lot of blood in her left ear.     OBJECTIVE:  Appearance: /85 (BP Location: Right arm, Cuff Size: Adult Regular)  Pulse 63  Temp 98.9  F (37.2  C) (Oral)  Ht 5' 2\" (1.575 m)  Wt 173 lb (78.5 kg)  SpO2 100%  BMI 31.64 kg/m2  healthy, alert and mild distress.  Neurological Exam: normal without focal findings, mental status, speech normal, alert and oriented x iii, JOELLE. Right TM normal Left TM unable to visualize blood in canal and pain with palpation over the mastoid bone.     ASSESSMENT:  Otitis with bleeding 1 month post surgery. R/o mastoiditis     PLAN:ED now she is going home to drop her children off and will go to ED for further evaluation.     "

## 2017-09-07 NOTE — ED AVS SNAPSHOT
[facility] Emergency Department    [address]    [facility] [address]    Phone:  [phone]    Fax:  [phone]                                       Mary Lerma   MRN: [id number]    Department:  [facility] Emergency Department   Date of Visit:  9/7/2017           Patient Information     Date Of Birth          1985        Your diagnoses for this visit were:     Other migraine with status migrainosus, not intractable        You were seen by Coty Salcido MD.        Discharge Instructions         Discharge Instructions  Migraine    You were seen today for a headache that your doctor thinks is a migraine. At this time your doctor does not find that your headache is a sign of anything dangerous or life-threatening.  However, sometimes the signs of serious illness do not show up right away.  If you have new or worse symptoms, you may need to be seen again in the Emergency Department or by your primary doctor.  Follow up with your regular doctor as directed today, or within the next week.     Return to the Emergency Department if:    You get a fever of 101 F or higher.    Your headache gets much worse.    You get a stiff neck with your headache.    You get a new headache that is different or worse than headaches you have had before.    You are vomiting and can t keep food or water down.    You have blurry or double vision or other problems with your eyes.    You have a new weakness on one side of your body.    You have difficulty with balance which is new.    You or your family thinks you are confused.    You have a seizure or convulsion.    Treatment:    Often, treatment for your migraine will take some time to make you headache stop.  Going home to sleep can be very effective.    Use your medications as directed because overuse can actually cause headaches.    Once your headache has gone away, avoid triggers such as certain foods, skipping meals, bright  lights, changes in sleep, exercise and stress.    Migraine headaches can have symptoms before the pain starts, like vision changes, funny smells/tastes, dizziness or other symptoms.    Treating a headache as soon as the first symptoms come on is very important and gives the best chance of stopping the headache.    If headaches are severe or frequent you may need to start daily medication to prevent the headaches.    Carbon monoxide can cause headaches, so not burning things in your home is important.  Also get a carbon monoxide detector.    Some medications for migraines may raise your blood pressure, so use with caution if you have high blood pressure or heart problems.  If you were given a prescription for medicine here today, be sure to read all of the information (including the package insert) that comes with your prescription.  This will include important information about the medicine, its side effects, and any warnings that you need to know about.  The pharmacist who fills the prescription can provide more information and answer questions you may have about the medicine.  If you have questions or concerns that the pharmacist cannot address, please call or return to the Emergency Department.       Remember that you can always come back to the Emergency Department if you are not able to see your regular doctor in the amount of time listed above, if you get any new symptoms, or if there is anything that worries you.      24 Hour Appointment Hotline       To make an appointment at any [facility], call [phone] ([phone]). If you don't have a family doctor or clinic, we will help you find one. [facility] clinics are conveniently located to serve the needs of you and your family.             Review of your medicines      Our records show that you are taking the medicines listed below. If these are incorrect, please call your family doctor or clinic.        Dose / Directions Last dose taken     medroxyPROGESTERone 150 MG/ML injection   Commonly known as:  DEPO-PROVERA   Dose:  150 mg   Quantity:  1 mL        Inject 1 mL (150 mg) into the muscle every 3 months   Refills:  3        oxyCODONE-acetaminophen 5-325 MG per tablet   Commonly known as:  PERCOCET   Dose:  1 tablet   Quantity:  20 tablet        Take 1 tablet by mouth every 6 hours as needed for pain maximum 3 tablet(s) per day   Refills:  0        SUMAtriptan 100 MG tablet   Commonly known as:  IMITREX   Dose:  100 mg   Quantity:  18 tablet        Take 1 tablet (100 mg) by mouth at onset of headache for migraine May repeat in 2 hours. Max 2 tablets/24 hours.   Refills:  1          ASK your doctor about these medications        Dose / Directions Last dose taken    ketorolac 60 MG/2ML Soln injection   Commonly known as:  TORADOL   Dose:  60 mg   Quantity:  2 mL   Ask about: Should I take this medication?        Inject 2 mLs (60 mg) into the muscle once for 1 dose   Refills:  0                Procedures and tests performed during your visit     Basic metabolic panel    CBC with platelets differential    CT Head Neck Angio w/o & w Contrast    CT Head w/o Contrast      Orders Needing Specimen Collection     None      Pending Results     No orders found for last 3 day(s).            Pending Culture Results     No orders found for last 3 day(s).            Pending Results Instructions     If you had any lab results that were not finalized at the time of your Discharge, you can call the ED Lab Result RN at [phone]. You will be contacted by this team for any positive Lab results or changes in treatment. The nurses are available 7 days a week from 10A to 6:30P.  You can leave a message 24 hours per day and they will return your call.        Test Results From Your Hospital Stay        9/7/2017 10:01 PM      Component Results     Component Value Ref Range & Units Status    Sodium 141 133 - 144 mmol/L Final    Potassium 3.9 3.4 - 5.3 mmol/L Final    Chloride  106 94 - 109 mmol/L Final    Carbon Dioxide 27 20 - 32 mmol/L Final    Anion Gap 8 3 - 14 mmol/L Final    Glucose 106 (H) 70 - 99 mg/dL Final    Urea Nitrogen 12 7 - 30 mg/dL Final    Creatinine 0.67 0.52 - 1.04 mg/dL Final    GFR Estimate >90 >60 mL/min/1.7m2 Final    Non  GFR Calc    GFR Estimate If Black >90 >60 mL/min/1.7m2 Final    African American GFR Calc    Calcium 9.0 8.5 - 10.1 mg/dL Final         9/7/2017  9:49 PM      Component Results     Component Value Ref Range & Units Status    WBC 11.4 (H) 4.0 - 11.0 10e9/L Final    RBC Count 4.56 3.8 - 5.2 10e12/L Final    Hemoglobin 13.3 11.7 - 15.7 g/dL Final    Hematocrit 40.5 35.0 - 47.0 % Final    MCV 89 78 - 100 fl Final    MCH 29.2 26.5 - 33.0 pg Final    MCHC 32.8 31.5 - 36.5 g/dL Final    RDW 12.4 10.0 - 15.0 % Final    Platelet Count 267 150 - 450 10e9/L Final    Diff Method Automated Method  Final    % Neutrophils 60.2 % Final    % Lymphocytes 31.6 % Final    % Monocytes 5.4 % Final    % Eosinophils 2.0 % Final    % Basophils 0.4 % Final    % Immature Granulocytes 0.4 % Final    Nucleated RBCs 0 0 /100 Final    Absolute Neutrophil 6.9 1.6 - 8.3 10e9/L Final    Absolute Lymphocytes 3.6 0.8 - 5.3 10e9/L Final    Absolute Monocytes 0.6 0.0 - 1.3 10e9/L Final    Absolute Eosinophils 0.2 0.0 - 0.7 10e9/L Final    Absolute Basophils 0.1 0.0 - 0.2 10e9/L Final    Abs Immature Granulocytes 0.0 0 - 0.4 10e9/L Final    Absolute Nucleated RBC 0.0  Final         9/7/2017 11:04 PM      Narrative     CT OF THE HEAD WITHOUT CONTRAST 9/7/2017 10:22 PM     COMPARISON: None.    HISTORY: Headache    TECHNIQUE: Axial CT images of the head from the skull base to the  vertex were acquired without IV contrast.    FINDINGS: The ventricles and basal cisterns are within normal limits  in configuration. There is no midline shift. There are no extra-axial  fluid collections. Gray-white differentiation is well maintained.    No intracranial hemorrhage, mass or  recent infarct.    The visualized paranasal sinuses are well-aerated. There is no  mastoiditis. There are no fractures of the visualized bones.        Impression     IMPRESSION: Normal head CT.      Radiation dose for this scan was reduced using automated exposure  control, adjustment of the mA and/or kV according to patient size, or  iterative reconstruction technique    BRIANNA BARCENAS MD         9/7/2017 11:04 PM      Narrative     CT ANGIOGRAM OF THE HEAD AND NECK WITHOUT AND WITH CONTRAST  9/7/2017  10:30 PM     COMPARISON: None.    HISTORY: Headache, normal head CT.    TECHNIQUE: Precontrast localizing scans were followed by CT  angiography with an injection of 70mL Isovue-370 nonionic intravenous  contrast material with scans through the head and neck. Images were  transferred to a separate 3-D workstation where multiplanar  reformations and 3-D images were created. Estimates of carotid  stenoses are made relative to the distal internal carotid artery  diameters except as noted.      FINDINGS:   Neck CTA: The bilateral common carotid, bilateral cervical internal  carotid and bilateral vertebral arteries are patent without stenosis.     Head CTA: Basilar, bilateral distal internal carotid, bilateral  anterior cerebral, bilateral middle cerebral and bilateral posterior  cerebral arteries are patent and unremarkable. The anterior  communicating artery is patent and unremarkable.        Impression     IMPRESSION: Normal neck and head CTA.      Radiation dose for this scan was reduced using automated exposure  control, adjustment of the mA and/or kV according to patient size, or  iterative reconstruction technique    BRIANNA BARCENAS MD                Clinical Quality Measure: Blood Pressure Screening     Your blood pressure was checked while you were in the emergency department today. The last reading we obtained was  BP: 115/70 . Please read the guidelines below about what these numbers mean and what you should do  about them.  If your systolic blood pressure (the top number) is less than 120 and your diastolic blood pressure (the bottom number) is less than 80, then your blood pressure is normal. There is nothing more that you need to do about it.  If your systolic blood pressure (the top number) is 120-139 or your diastolic blood pressure (the bottom number) is 80-89, your blood pressure may be higher than it should be. You should have your blood pressure rechecked within a year by a primary care provider.  If your systolic blood pressure (the top number) is 140 or greater or your diastolic blood pressure (the bottom number) is 90 or greater, you may have high blood pressure. High blood pressure is treatable, but if left untreated over time it can put you at risk for heart attack, stroke, or kidney failure. You should have your blood pressure rechecked by a primary care provider within the next 4 weeks.  If your provider in the emergency department today gave you specific instructions to follow-up with your doctor or provider even sooner than that, you should follow that instruction and not wait for up to 4 weeks for your follow-up visit.        Thank you for choosing [facility]       Thank you for choosing [facility] for your care. Our goal is always to provide you with excellent care. Hearing back from our patients is one way we can continue to improve our services. Please take a few minutes to complete the written survey that you may receive in the mail after you visit with us. Thank you!        [facility] Information     [facility] gives you secure access to your electronic health record. If you see a primary care provider, you can also send messages to your care team and make appointments. If you have questions, please call your primary care clinic.  If you do not have a primary care provider, please call [phone] and they will assist you.        Care EveryWhere ID     This is your Care EveryWhere ID. This could be used by  other organizations to access your [facility] medical records  [id number]        Equal Access to Services     ANDERSON DARNELL : Tonny Dodd, hilda may, héctor begum. So [facility] [phone].    ATENCIÓN: Si habla español, tiene a myers disposición servicios gratuitos de asistencia lingüística. Llame al [phone].    We comply with applicable federal civil rights laws and [place] laws. We do not discriminate on the basis of race, color, national origin, age, disability sex, sexual orientation or gender identity.            After Visit Summary       This is your record. Keep this with you and show to your community pharmacist(s) and doctor(s) at your next visit.

## 2017-09-07 NOTE — ED AVS SNAPSHOT
[facility] Emergency Department    [address]    [facility] [address]    Phone:  [phone]    Fax:  [phone]                                       Mary Lerma   MRN: [id number]    Department:  [facility] Emergency Department   Date of Visit:  9/7/2017           After Visit Summary Signature Page     I have received my discharge instructions, and my questions have been answered. I have discussed any challenges I see with this plan with the nurse or doctor.    ..........................................................................................................................................  Patient/Patient Representative Signature      ..........................................................................................................................................  Patient Representative Print Name and Relationship to Patient    ..................................................               ................................................  Date                                            Time    ..........................................................................................................................................  Reviewed by Signature/Title    ...................................................              ..............................................  Date                                                            Time

## 2017-09-08 VITALS
OXYGEN SATURATION: 99 % | TEMPERATURE: 98.4 F | BODY MASS INDEX: 31.64 KG/M2 | WEIGHT: 173 LBS | RESPIRATION RATE: 16 BRPM | DIASTOLIC BLOOD PRESSURE: 60 MMHG | SYSTOLIC BLOOD PRESSURE: 112 MMHG | HEART RATE: 92 BPM

## 2017-09-08 PROCEDURE — 96376 TX/PRO/DX INJ SAME DRUG ADON: CPT

## 2017-09-08 PROCEDURE — 25000128 H RX IP 250 OP 636: Performed by: INTERNAL MEDICINE

## 2017-09-08 RX ORDER — METOCLOPRAMIDE HYDROCHLORIDE 5 MG/ML
5 INJECTION INTRAMUSCULAR; INTRAVENOUS ONCE
Status: COMPLETED | OUTPATIENT
Start: 2017-09-08 | End: 2017-09-08

## 2017-09-08 RX ORDER — DIPHENHYDRAMINE HYDROCHLORIDE 50 MG/ML
25 INJECTION INTRAMUSCULAR; INTRAVENOUS ONCE
Status: COMPLETED | OUTPATIENT
Start: 2017-09-08 | End: 2017-09-08

## 2017-09-08 RX ADMIN — DIPHENHYDRAMINE HYDROCHLORIDE 25 MG: 50 INJECTION, SOLUTION INTRAMUSCULAR; INTRAVENOUS at 00:26

## 2017-09-08 RX ADMIN — METOCLOPRAMIDE 5 MG: 5 INJECTION, SOLUTION INTRAMUSCULAR; INTRAVENOUS at 00:27

## 2017-09-08 NOTE — ED PROVIDER NOTES
"  History     Chief Complaint:  Headache     HPI   Mary Lerma is a 31 year old female, with a history of headaches, who presents with headache. The patient states that she has had a gradual onset headache approximately 2 weeks ago that prompted her to visit her clinic where she was given Imitrex and Toradol as well as a prescription for Imitrex. The prescription had help relieved her headaches. Yesterday, the patient's headache had returned, prompting her visit urgent care. There, she was given 2 doses of Toradol that had resolved her headache. Last night, she states that she had an onset severe headache, noted the \"worst headache she has every had,\" with associated blood in her ears. She denies head injuries or trauma, recent ear infections, or recent illnesses.    Allergies:  Strattera [Atomoxetine]      Medications:    Imitrex  Depo-provera    Past Medical History:    The patient denies any significant past medical history.     Past Surgical History:    Cholecystectomy  Gastric sleeve  Myringotomy, insert tube bilateral, combined  Orthopedic surgery  Tonsillectomy    Family History:    Diabetes  Hypertension    Social History:  Former smoker - quit 2/9/2017  Negative for alcohol use.   Marital Status:   [2]     Review of Systems   Constitutional: Negative for fever.   HENT: Positive for ear discharge (blood).    Neurological: Positive for headaches.   All other systems reviewed and are negative.      Physical Exam   First Vitals:  BP: (!) 154/100  Pulse: 92  Temp: 98.4  F (36.9  C)  Resp: 18  Weight: 78.5 kg (173 lb)  SpO2: 100 %      Physical Exam   Constitutional: She is cooperative.   HENT:   Left Ear: There is mastoid tenderness. There is hemotympanum.   Mouth/Throat: Oropharynx is clear and moist and mucous membranes are normal.   Bilateral PE tubes  Blood coming from PE tube on left   Eyes: Conjunctivae are normal.   Neck: Normal range of motion.   Cardiovascular: Regular rhythm and normal heart " sounds.    Pulmonary/Chest: Effort normal and breath sounds normal.   Abdominal: Soft. Normal appearance and bowel sounds are normal. There is no rebound and no guarding.   Musculoskeletal: Normal range of motion.   Lymphadenopathy:     She has no cervical adenopathy.   Neurological: She is alert.   Skin: Skin is warm and dry.   Psychiatric: She has a normal mood and affect.       Emergency Department Course     Imaging:  Radiographic findings were communicated with the patient who voiced understanding of the findings.  CT Head without contrast:   Normal head CT. As per radiology.    CT Head Neck Angio with and without contrast:   Normal neck and head CTA. As per radiology.     Laboratory:  CBC: WBC: 11.4(H), HGB: 13.3, PLT: 267  BMP: Glucose 106(H) o/w WNL (Creatinine: 0.67)    Interventions:  2137 Dilaudid, 0.5 mg, IV   2227 Zofran, 4 mg, IV  2228 Dilaudid, 0.5 mg, IV  2347 Dilaudid, 0.5 mg, IV  2348 Benadryl, 25 mg, IV  2349 Decadron, 10 mg, IV  2354 Reglan, 5 mg, IV  0026 Benadryl, 25 mg, IV  0027 Reglan, 5 mg, IV    Emergency Department Course:  Nursing notes and vitals reviewed. I performed an exam of the patient as documented above.     IV inserted. Medicine administered as documented above. Blood drawn. This was sent to the lab for further testing, results above.    The patient was sent for a head and neck CT and CTA while in the emergency department, findings above.     0031 I rechecked the patient and discussed the results of her workup thus far.     Findings and plan explained to the Patient. Patient discharged home with instructions regarding supportive care, medications, and reasons to return. The importance of close follow-up was reviewed.     I personally reviewed the laboratory results with the Patient and answered all related questions prior to discharge.       Impression & Plan      Medical Decision Making:  Mary Lerma is a 31 year old female with a history of migraines who presents  complaining of an unusually severe headache as well as bleeding from the left ear. One exam, there was blood coming from the PE tube as well as hemotympanum. She was concerned about the possibility of aneurysmal bleeding though, discussed with her that it would not cause blood from the PE tube. I was concerned that there could be mastoiditis given her tenderness in that area. Fortunately, however, CT an CTA do not show any evidence of intracranial bleed, aneurysm, or abnormalities of the mastoid or other significant abnormalities. With medication, her headache is significantly improved. I will discharge the patient home with instructions to follow up closely with primary care, return with worsening symptoms.     Diagnosis:    ICD-10-CM   1. Other migraine with status migrainosus, not intractable G43.801       Disposition:  discharged to home    I, Jamia Zambrano, am serving as a scribe on 9/7/2017 at 9:15 PM to personally document services performed by Coty Salcido MD based on my observations and the provider's statements to me.      Jamia Zambrano  9/7/2017   [facility] EMERGENCY DEPARTMENT       Coty Salcido MD  09/11/17 [id number]

## 2017-09-08 NOTE — DISCHARGE INSTRUCTIONS
Discharge Instructions  Migraine    You were seen today for a headache that your doctor thinks is a migraine. At this time your doctor does not find that your headache is a sign of anything dangerous or life-threatening.  However, sometimes the signs of serious illness do not show up right away.  If you have new or worse symptoms, you may need to be seen again in the Emergency Department or by your primary doctor.  Follow up with your regular doctor as directed today, or within the next week.     Return to the Emergency Department if:    You get a fever of 101 F or higher.    Your headache gets much worse.    You get a stiff neck with your headache.    You get a new headache that is different or worse than headaches you have had before.    You are vomiting and can t keep food or water down.    You have blurry or double vision or other problems with your eyes.    You have a new weakness on one side of your body.    You have difficulty with balance which is new.    You or your family thinks you are confused.    You have a seizure or convulsion.    Treatment:    Often, treatment for your migraine will take some time to make you headache stop.  Going home to sleep can be very effective.    Use your medications as directed because overuse can actually cause headaches.    Once your headache has gone away, avoid triggers such as certain foods, skipping meals, bright lights, changes in sleep, exercise and stress.    Migraine headaches can have symptoms before the pain starts, like vision changes, funny smells/tastes, dizziness or other symptoms.    Treating a headache as soon as the first symptoms come on is very important and gives the best chance of stopping the headache.    If headaches are severe or frequent you may need to start daily medication to prevent the headaches.    Carbon monoxide can cause headaches, so not burning things in your home is important.  Also get a carbon monoxide detector.    Some medications  for migraines may raise your blood pressure, so use with caution if you have high blood pressure or heart problems.  If you were given a prescription for medicine here today, be sure to read all of the information (including the package insert) that comes with your prescription.  This will include important information about the medicine, its side effects, and any warnings that you need to know about.  The pharmacist who fills the prescription can provide more information and answer questions you may have about the medicine.  If you have questions or concerns that the pharmacist cannot address, please call or return to the Emergency Department.       Remember that you can always come back to the Emergency Department if you are not able to see your regular doctor in the amount of time listed above, if you get any new symptoms, or if there is anything that worries you.

## 2017-09-08 NOTE — ED NOTES
31-year-old female presents to the ER with complaints of headache and blood coming from her left ear. Pt was seen at urgent care yesterday and given a shot of toradol for her headache. But today had blood coming out of her left ear so patient went back to urgent care today and they told her to come here. Ear is not currently bleeding.

## 2017-09-10 ENCOUNTER — HOSPITAL ENCOUNTER (EMERGENCY)
Facility: CLINIC | Age: 32
Discharge: HOME | End: 2017-09-10
Attending: EMERGENCY MEDICINE | Admitting: EMERGENCY MEDICINE
Payer: COMMERCIAL

## 2017-09-10 VITALS
DIASTOLIC BLOOD PRESSURE: 70 MMHG | HEART RATE: 84 BPM | RESPIRATION RATE: 18 BRPM | SYSTOLIC BLOOD PRESSURE: 114 MMHG | OXYGEN SATURATION: 97 % | TEMPERATURE: 98.2 F

## 2017-09-10 DIAGNOSIS — R51.9 NONINTRACTABLE HEADACHE, UNSPECIFIED CHRONICITY PATTERN, UNSPECIFIED HEADACHE TYPE: ICD-10-CM

## 2017-09-10 LAB
ALBUMIN UR-MCNC: NEGATIVE MG/DL
AMORPH CRY #/AREA URNS HPF: ABNORMAL /HPF
ANION GAP SERPL CALCULATED.3IONS-SCNC: 5 MMOL/L (ref 3–14)
APPEARANCE UR: ABNORMAL
BACTERIA #/AREA URNS HPF: ABNORMAL /HPF
BASOPHILS # BLD AUTO: 0.1 10E9/L (ref 0–0.2)
BASOPHILS NFR BLD AUTO: 0.6 %
BILIRUB UR QL STRIP: NEGATIVE
BUN SERPL-MCNC: 13 MG/DL (ref 7–30)
CALCIUM SERPL-MCNC: 8.8 MG/DL (ref 8.5–10.1)
CHLORIDE SERPL-SCNC: 107 MMOL/L (ref 94–109)
CO2 SERPL-SCNC: 29 MMOL/L (ref 20–32)
COLOR UR AUTO: YELLOW
CREAT SERPL-MCNC: 0.66 MG/DL (ref 0.52–1.04)
DIFFERENTIAL METHOD BLD: NORMAL
EOSINOPHIL # BLD AUTO: 0.2 10E9/L (ref 0–0.7)
EOSINOPHIL NFR BLD AUTO: 2.1 %
ERYTHROCYTE [DISTWIDTH] IN BLOOD BY AUTOMATED COUNT: 12.7 % (ref 10–15)
ERYTHROCYTE [SEDIMENTATION RATE] IN BLOOD BY WESTERGREN METHOD: 6 MM/H (ref 0–20)
GFR SERPL CREATININE-BSD FRML MDRD: >90 ML/MIN/1.7M2
GLUCOSE SERPL-MCNC: 113 MG/DL (ref 70–99)
GLUCOSE UR STRIP-MCNC: NEGATIVE MG/DL
HCG UR QL: NEGATIVE
HCT VFR BLD AUTO: 41.2 % (ref 35–47)
HGB BLD-MCNC: 13.9 G/DL (ref 11.7–15.7)
HGB UR QL STRIP: ABNORMAL
IMM GRANULOCYTES # BLD: 0 10E9/L (ref 0–0.4)
IMM GRANULOCYTES NFR BLD: 0.3 %
KETONES UR STRIP-MCNC: NEGATIVE MG/DL
LEUKOCYTE ESTERASE UR QL STRIP: NEGATIVE
LYMPHOCYTES # BLD AUTO: 3.7 10E9/L (ref 0.8–5.3)
LYMPHOCYTES NFR BLD AUTO: 35.6 %
MCH RBC QN AUTO: 29.8 PG (ref 26.5–33)
MCHC RBC AUTO-ENTMCNC: 33.7 G/DL (ref 31.5–36.5)
MCV RBC AUTO: 88 FL (ref 78–100)
MONOCYTES # BLD AUTO: 0.7 10E9/L (ref 0–1.3)
MONOCYTES NFR BLD AUTO: 6.3 %
MUCOUS THREADS #/AREA URNS LPF: PRESENT /LPF
NEUTROPHILS # BLD AUTO: 5.8 10E9/L (ref 1.6–8.3)
NEUTROPHILS NFR BLD AUTO: 55.1 %
NITRATE UR QL: NEGATIVE
NRBC # BLD AUTO: 0 10*3/UL
NRBC BLD AUTO-RTO: 0 /100
PH UR STRIP: 8 PH (ref 5–7)
PLATELET # BLD AUTO: 268 10E9/L (ref 150–450)
POTASSIUM SERPL-SCNC: 4 MMOL/L (ref 3.4–5.3)
RBC # BLD AUTO: 4.66 10E12/L (ref 3.8–5.2)
RBC #/AREA URNS AUTO: 3 /HPF (ref 0–2)
SODIUM SERPL-SCNC: 141 MMOL/L (ref 133–144)
SOURCE: ABNORMAL
SP GR UR STRIP: 1.01 (ref 1–1.03)
SQUAMOUS #/AREA URNS AUTO: 1 /HPF (ref 0–1)
UROBILINOGEN UR STRIP-MCNC: 0 MG/DL (ref 0–2)
WBC # BLD AUTO: 10.5 10E9/L (ref 4–11)
WBC #/AREA URNS AUTO: 3 /HPF (ref 0–2)

## 2017-09-10 PROCEDURE — 85025 COMPLETE CBC W/AUTO DIFF WBC: CPT | Performed by: EMERGENCY MEDICINE

## 2017-09-10 PROCEDURE — 81001 URINALYSIS AUTO W/SCOPE: CPT | Performed by: EMERGENCY MEDICINE

## 2017-09-10 PROCEDURE — 81025 URINE PREGNANCY TEST: CPT | Performed by: EMERGENCY MEDICINE

## 2017-09-10 PROCEDURE — 85652 RBC SED RATE AUTOMATED: CPT | Performed by: EMERGENCY MEDICINE

## 2017-09-10 PROCEDURE — 96375 TX/PRO/DX INJ NEW DRUG ADDON: CPT

## 2017-09-10 PROCEDURE — 96374 THER/PROPH/DIAG INJ IV PUSH: CPT

## 2017-09-10 PROCEDURE — 96361 HYDRATE IV INFUSION ADD-ON: CPT

## 2017-09-10 PROCEDURE — 80048 BASIC METABOLIC PNL TOTAL CA: CPT | Performed by: EMERGENCY MEDICINE

## 2017-09-10 PROCEDURE — 25000128 H RX IP 250 OP 636: Performed by: EMERGENCY MEDICINE

## 2017-09-10 PROCEDURE — 99285 EMERGENCY DEPT VISIT HI MDM: CPT | Mod: 25

## 2017-09-10 RX ORDER — MORPHINE SULFATE 4 MG/ML
4 INJECTION, SOLUTION INTRAMUSCULAR; INTRAVENOUS
Status: DISCONTINUED | OUTPATIENT
Start: 2017-09-10 | End: 2017-09-10 | Stop reason: HOSPADM

## 2017-09-10 RX ORDER — DEXAMETHASONE SODIUM PHOSPHATE 10 MG/ML
10 INJECTION, SOLUTION INTRAMUSCULAR; INTRAVENOUS ONCE
Status: COMPLETED | OUTPATIENT
Start: 2017-09-10 | End: 2017-09-10

## 2017-09-10 RX ORDER — HYDROMORPHONE HCL/0.9% NACL/PF 0.2MG/0.2
0.2 SYRINGE (ML) INTRAVENOUS
Status: DISCONTINUED | OUTPATIENT
Start: 2017-09-10 | End: 2017-09-10 | Stop reason: HOSPADM

## 2017-09-10 RX ORDER — KETOROLAC TROMETHAMINE 30 MG/ML
30 INJECTION, SOLUTION INTRAMUSCULAR; INTRAVENOUS ONCE
Status: COMPLETED | OUTPATIENT
Start: 2017-09-10 | End: 2017-09-10

## 2017-09-10 RX ORDER — METOCLOPRAMIDE HYDROCHLORIDE 5 MG/ML
10 INJECTION INTRAMUSCULAR; INTRAVENOUS ONCE
Status: COMPLETED | OUTPATIENT
Start: 2017-09-10 | End: 2017-09-10

## 2017-09-10 RX ORDER — OXYCODONE AND ACETAMINOPHEN 5; 325 MG/1; MG/1
1-2 TABLET ORAL EVERY 6 HOURS PRN
Qty: 15 TABLET | Refills: 0 | Status: SHIPPED | OUTPATIENT
Start: 2017-09-10 | End: 2017-10-24

## 2017-09-10 RX ORDER — ONDANSETRON 4 MG/1
4 TABLET, ORALLY DISINTEGRATING ORAL EVERY 8 HOURS PRN
Qty: 15 TABLET | Refills: 0 | Status: SHIPPED | OUTPATIENT
Start: 2017-09-10 | End: 2017-09-15

## 2017-09-10 RX ADMIN — KETOROLAC TROMETHAMINE 30 MG: 30 INJECTION, SOLUTION INTRAMUSCULAR at 10:12

## 2017-09-10 RX ADMIN — Medication 0.2 MG: at 12:32

## 2017-09-10 RX ADMIN — DEXAMETHASONE SODIUM PHOSPHATE 10 MG: 10 INJECTION, SOLUTION INTRAMUSCULAR; INTRAVENOUS at 10:12

## 2017-09-10 RX ADMIN — MORPHINE SULFATE 4 MG: 4 INJECTION, SOLUTION INTRAMUSCULAR; INTRAVENOUS at 11:50

## 2017-09-10 RX ADMIN — SODIUM CHLORIDE 1000 ML: 9 INJECTION, SOLUTION INTRAVENOUS at 10:16

## 2017-09-10 RX ADMIN — METOCLOPRAMIDE 10 MG: 5 INJECTION, SOLUTION INTRAMUSCULAR; INTRAVENOUS at 10:12

## 2017-09-10 ASSESSMENT — ENCOUNTER SYMPTOMS
HEADACHES: 1
NAUSEA: 1
WEAKNESS: 0
EYE DISCHARGE: 1
VOMITING: 0

## 2017-09-10 NOTE — DISCHARGE INSTRUCTIONS
* HEADACHE [unspecified]    The cause of your headache today is not clear, but it does not appear to be the sign of any serious illness.  Under stress, some people tense the muscles of their shoulder, neck and scalp without knowing it. If this condition lasts long enough, a TENSION HEADACHE can occur.  A MIGRAINE HEADACHE is caused by changes in blood flow to the brain. It can be mild or severe. A migraine attack may be triggered by emotional stress, hormone changes during the menstrual cycle, oral contraceptives, alcohol use, certain foods containing tyramine, eye strain, weather changes, missing meals, lack of sleep or oversleeping.  Other causes of headache include a viral illness, sinus, ear or throat infection, dental pain and TMJ (jaw joint) pain.  HOME CARE:    If you were given pain medicine for this headache, do not drive yourself home. Arrange for a ride, instead. When you get home, try to sleep. You should feel much better when you wake up.    If you are having nausea or vomiting, follow a light diet until your headache is relieved.    If you have a migraine type headache, use sunglasses when in the daylight or around bright indoor lighting until symptoms improve. Bright glaring light can worsen this kind of headache.  FOLLOW UP with your doctor if the headache is not better within the next 24 hours. If you have frequent headaches you should discuss a treatment plan with your primary care doctor. By being aware of the earliest signs of headache, and starting treatment right away, you may be able to stop the pain yourself.  GET PROMPT MEDICAL ATTENTION if any of the following occur:    Worsening of your head pain or no improvement within 24 hours    Repeated vomiting (unable to keep liquids down)    Fever over 101 F (38.3 C)    Stiff neck    Extreme drowsiness, confusion or fainting    Weakness of an arm or leg or one side of the face    Difficulty with speech or vision    [id number], [address]  [place] [address]. All rights reserved. This information is not intended as a substitute for professional medical care. Always follow your healthcare professional's instructions.

## 2017-09-10 NOTE — ED PROVIDER NOTES
History     Chief Complaint:  Headache    HPI   Mary Lerma is a 31 year old female with a history of headaches who presents to the ED for evaluation of a headache. 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 She reports that four days ago, she began having a headache and tingling sensation in her left cheek, and this radiated down her arm. The patient took Imitrex at home, but this did not alleviate her symptoms. Patient gets headaches monthly, but denies being followed by neurology for treatment. She was last seen in the ED for her headaches three days ago, at which time she was evaluated by Dr. aSlcido. She was given Dilaudid, Zofran, Benadryl, Decadron, and Reglan in the ED, and sent home in stable condition. Labs, CT imaging results, and discharge summary from this visit are provided below. Upon evaluation in the ED, she also endorses symptoms of nausea. She denies any history of diabetes, and denies any vomiting, diplopia, or weakness. Of note, the patient additionally endorses tinnitus and bleeding from the left ear, and states she had tubes placed on 7/10/2017, details provided below. Denies any trauma.   Patient was seen by Dr Salcido on 9/7 for headache.  CT of head and CT angio is negative.  Patient does not have a primary neurologist.  Patient was given percocet on 9/6 by PCP clinic.    OP Note from 7/10/2017 9:41 PM  Jacques Lawson MD   Otolaryngology/ENT      DATE OF PROCEDURE: 07/10/17    PREOPERATIVE DIAGNOSIS: Recurrent tonsillitis.    POSTOPERATIVE DIAGNOSIS: Recurrent tonsillitis.      PROCEDURES PERFORMED:   1. Bilateral PE tube placement  2. Tonsillectomy    Lab Results from 9/7/2017:  CBC: WBC: 11.4(H), HGB: 13.3, PLT: 267  BMP: Glucose 106(H) o/w WNL (Creatinine: 0.67)     Imaging Results from 9/7/2017:  CT Head without contrast:   Normal head CT. As per radiology.     CT Head Neck Angio with and without contrast:   Normal neck and head CTA. As per radiology.     Discharge Summary  from 9/7/2017:  1. Other migraine with status migrainosus, not intractable G43.801    Allergies:  Strattera - hives     Medications:    Percocet  Imitrex  Depo-provera     Past Medical History:    Headaches  Otitis media and recurrent tonsillitis    Past Surgical History:    Cholecystectomy   Gastric sleeve - 2015  Myringotomy, insert tube bilateral - 7/10/2017  Orthopedic surgery  Tonsillectomy, bilateral - 7/10/2017    Family History:    Father - diabetes, hypertension     Social History:  The patient presented to the ED alone.  Smoking Status: Former smoker, cigarettes. Quit in February 2017.   Smokeless Tobacco: Never used.   Alcohol Use: No  Marital Status:     PCP: [facility] in [place]     Review of Systems   HENT: Positive for tinnitus.    Eyes: Positive for discharge (left, bleeding ). Negative for visual disturbance (diplopia).   Gastrointestinal: Positive for nausea. Negative for vomiting.   Neurological: Positive for headaches. Negative for weakness.        Positive for some tingling on left cheek and arm.   All other systems reviewed and are negative.    Hx headaches. Pt was seen 9/7 for same symptoms. Pt c/o L sided tingling today.     Physical Exam     Patient Vitals for the past 24 hrs:   BP Temp Temp src Pulse Resp SpO2   09/10/17 1345 114/70 - - - - 97 %   09/10/17 1330 106/57 - - - - 97 %   09/10/17 1315 107/58 - - - - 97 %   09/10/17 1245 116/66 - - - - -   09/10/17 1215 - - - - - 97 %   09/10/17 1145 - - - - - 97 %   09/10/17 1140 - - - - - 98 %   09/10/17 1113 117/60 - - - - -   09/10/17 0810 140/86 98.2  F (36.8  C) Oral 84 18 100 %      Physical Exam  GEN: patient appears tired  HEAD: atraumatic, normocephalic, no temple tenderness  EYES: pupils reactive, conjunctivae normal  ENT: TMs flat and white bilaterally, oropharynx normal with no erythema or exudate, mucus membranes moist  NECK: no cervical LAD, no meningeal signs  RESPIRATORY: no tachypnea, breath sounds clear to  auscultation (no rales, wheezes, rhonchi), chest wall nontender, normal phonation  CVS: normal S1/S2, no murmurs/rubs/gallops  ABDOMEN: soft, nontender, no masses or organomegaly, no rebound, decreased bowel sounds  BACK: no costovertebral angle tenderness  EXTREMITIES: intact pulses x 2 (radial pulses intact),  no edema  SKIN: warm and dry  NEURO: GCS 15, cranial nerves intact.  Motor- moves all 4 extremities with 5/5 strength,  5/5.  DF and PF 5/5. Hip and knee extension 5/5 .  Sensation- intact all dermatones to pinprick and light touch (but subjectively decreased on the left side).  Reflexes- DTRs 2plus.  Coordination- ambulatory, no ataxia.  Overall symmetrical exam  HEME: no bruising or petechiae/contusions  LYMPH: no lymphadenopathy    Emergency Department Course     Laboratory:  UA: urine blood small, pH urine 8.0 high, WBC/HPF 3 high, RBC/HPF 3 high, bacteria moderate, mucous urine present, amorphous crystals few  HCG qualitative urine: Negative  CBC: All WNL (WBC 10.5, HGB 13.9, )   BMP: Glucose 113 high, o/w WNL (Creatinine 0.66)   Erythrocyte sedimentation rate auto: 6    Interventions:  (1012) Decadron 10 mg IV  (1012) Toradol 30 mg IV  (1012) Reglan 10 mg IV  (1016) Sodium chloride bolus 1000 mL IV  (1150) Morphine 4 mg IV  (1232) Dilaudid 0.2 mg IV  [place]  Cardiac/Sp02 monitoring      Emergency Department Course:  Nursing notes and vitals reviewed.  I performed an exam of the patient as documented above.    The patient provided a urine sample here in the emergency department. This was sent for laboratory testing, findings above.   Blood drawn. This was sent to the lab for further testing, results above.     (1150) I reassessed the patient.   (1222) I rechecked the patient and updated her on findings.     Pt felt improved after the above interventions.     Findings and plan explained to the Patient. Patient discharged home with instructions regarding supportive care, medications, and  reasons to return. The importance of close follow-up was reviewed. The patient was prescribed Zofran and Percocet.     /70  Pulse 84  Temp 98.2  F (36.8  C) (Oral)  Resp 18  SpO2 97%  Discussed results with patient.  Gave patient copies of results (applicable labs, CT scans and/or ultrasound).  Answered questions.  Asked patient to followup with PCP.    Impression & Plan      Medical Decision Making:  This is a pleasant 31 year old female who presents with  acute cephalgia.   She presents with an acute exacerbation of a usual headache that she has had for the last couple of weeks. She has tried Imitrex and had to come into the ER for pain medicine with a very extensive workup done at that time - the CT of the head and CT angio showing no acute changes or dissection or mass.    We considered the following underlying pathology for the patient's headache today:     Subarachnoid hemorrhage: Patient did not have an abrupt onset of their headache and was not considered the worst of their life. There is no known history of intra-cranial aneurysm, neck pain, neurologic impairment and no recent syncope or seizure activity. Otherwise the history is not suggestive of SAH and thus not pursued in the ED today with lumbar puncture.     Acute angle closure glaucoma:  Patient has no significant eye pain or vision loss.  The pupils are reactive and equal with no evidence of corneal clouding.     Temporal arteritis:  No history of polymyalgia rheumatica, jaw claudication and non-tender temporal artery on examination.  ESR is normal.     Carbon monoxide toxicity:  There is no relationship to the patient's headache and confinement to a certain environment. There are no other contacts at this time will similar or worsening headache.     Intracranial mass: Patient does not have a temporal relationship the headaches with worsening in the morning. The neurologic exam is without impairment the patient has no history of active  malignancy.     Meningitis: Patient has no neck pain, fever, meningismus or exposure to contacts with meningitis.     An IV was placed and the patient was given IV pain medication in addition to hydration. Her sed rate was normal, showing no signs of inflammation. Her urinalysis showed 3 white cells, a little bit of RBCs. CBC did not show any abnormalities. I did not repeat the patient s imaging that was recently performed (ie CT of the head and CT angio). She was given IV fluids and anti-pain medicine and anti-inflammatories, and is feeling improved. We did give her a referral to [facility], and she will follow up with them. The patient was given copies of all her studies to follow up today. A limited amount of pain medicine was prescribed for her to take home.     It appears the patient's headache is most suggestive of recurrent migraine. Headache improved the ED with the interventions as described above and will be sent home with anti-emetics and pain medicine. Patient is encouraged to follow up with their primary physician for further management of the headache including prevention. Patient is counseled to return to the ED immediately for fever, vision changes, numbness, weakness, neck pain, failure to improve, worsening headache or any other concerns.    Diagnosis:    ICD-10-CM   1. Nonintractable headache, unspecified chronicity pattern, unspecified headache type R51     Disposition:  Discharged to home with Zofran and Percocet.     Discharge Medications:  Discharge Medication List as of 9/10/2017  1:46 PM      START taking these medications    Details   ondansetron (ZOFRAN ODT) 4 MG ODT tab Take 1 tablet (4 mg) by mouth every 8 hours as needed, Disp-15 tablet, R-0, Local Print      !! oxyCODONE-acetaminophen (PERCOCET) 5-325 MG per tablet Take 1-2 tablets by mouth every 6 hours as needed for moderate to severe pain, Disp-15 tablet, R-0, Local Print       !! - Potential duplicate  medications found. Please discuss with provider.        Instructions to patient:  Reasons to return: chest pain, shortness of breath, nausea/vomiting, bleeding, confusion, blood in stools, dizziness, passing out, increasing headache, weakness, inability to walk.  Also return if cough, difficulty breathing, nausea/vomiting, confusion, or any other problems.    Please followup with your doctor in 1-3 days.  Come back if not better.  Referral back to [facility].    I, Anthony Giles, am serving as a scribe at 9:46 AM on 9/10/2017 to document services personally performed by Nelli Dennis MD based on my observations and the provider's statements to me.   [facility] EMERGENCY DEPARTMENT       Nelli Dennis MD  09/11/17 0740

## 2017-09-10 NOTE — ED AVS SNAPSHOT
[facility] Emergency Department    [address]    [facility] [address]    Phone:  [phone]    Fax:  [phone]                                       Mary Lerma   MRN: [id number]    Department:  [facility] Emergency Department   Date of Visit:  9/10/2017           Patient Information     Date Of Birth          1985        Your diagnoses for this visit were:     Nonintractable headache, unspecified chronicity pattern, unspecified headache type        You were seen by Jazmyn Bahena MD and Nelli Dennis MD.      Follow-up Information     Follow up with Elena Mcdonough NP.    Specialty:  Nurse Practitioner - Family    Why:  [facility] neurology    Contact information:    [facility]  [address]  [facility] [address] [phone]          Discharge Instructions          * HEADACHE [unspecified]    The cause of your headache today is not clear, but it does not appear to be the sign of any serious illness.  Under stress, some people tense the muscles of their shoulder, neck and scalp without knowing it. If this condition lasts long enough, a TENSION HEADACHE can occur.  A MIGRAINE HEADACHE is caused by changes in blood flow to the brain. It can be mild or severe. A migraine attack may be triggered by emotional stress, hormone changes during the menstrual cycle, oral contraceptives, alcohol use, certain foods containing tyramine, eye strain, weather changes, missing meals, lack of sleep or oversleeping.  Other causes of headache include a viral illness, sinus, ear or throat infection, dental pain and TMJ (jaw joint) pain.  HOME CARE:    If you were given pain medicine for this headache, do not drive yourself home. Arrange for a ride, instead. When you get home, try to sleep. You should feel much better when you wake up.    If you are having nausea or vomiting, follow a light diet until your headache is relieved.    If you  have a migraine type headache, use sunglasses when in the daylight or around bright indoor lighting until symptoms improve. Bright glaring light can worsen this kind of headache.  FOLLOW UP with your doctor if the headache is not better within the next 24 hours. If you have frequent headaches you should discuss a treatment plan with your primary care doctor. By being aware of the earliest signs of headache, and starting treatment right away, you may be able to stop the pain yourself.  GET PROMPT MEDICAL ATTENTION if any of the following occur:    Worsening of your head pain or no improvement within 24 hours    Repeated vomiting (unable to keep liquids down)    Fever over 101 F (38.3 C)    Stiff neck    Extreme drowsiness, confusion or fainting    Weakness of an arm or leg or one side of the face    Difficulty with speech or vision    [address]267[address] [place] [address]. All rights reserved. This information is not intended as a substitute for professional medical care. Always follow your healthcare professional's instructions.      Future Appointments        Provider Department Dept Phone Center    9/18/2017 12:00 PM Jacques Lawson MD [facility]ose [facility] Th[facility] [phone] [facility]      24 Hour Appointment Hotline       To make an appointment at any [facility], call [phone] ([phone]). If you don't have a family doctor or clinic, we will help you find one. [facility] are conveniently located to serve the needs of you and your family.          ED Discharge Orders     NEUROLOGY ADULT REFERRAL       Your provider has referred you for the following:       Please be aware that coverage of these services is subject to the terms and limitations of your health insurance plan.  Call member services at your health plan with any benefit or coverage questions.      Please bring the following with you to your appointment:    (1) Any X-Rays, CTs or MRIs which have been  performed.  Contact the facility where they were done to arrange for  prior to your scheduled appointment.    (2) List of current medications  (3) This referral request   (4) Any documents/labs given to you for this referral                     Review of your medicines      START taking        Dose / Directions Last dose taken    ondansetron 4 MG ODT tab   Commonly known as:  ZOFRAN ODT   Dose:  4 mg   Quantity:  15 tablet        Take 1 tablet (4 mg) by mouth every 8 hours as needed   Refills:  0          CONTINUE these medicines which may have CHANGED, or have new prescriptions. If we are uncertain of the size of tablets/capsules you have at home, strength may be listed as something that might have changed.        Dose / Directions Last dose taken    * oxyCODONE-acetaminophen 5-325 MG per tablet   Commonly known as:  PERCOCET   Dose:  1 tablet   What changed:  Another medication with the same name was added. Make sure you understand how and when to take each.   Quantity:  20 tablet        Take 1 tablet by mouth every 6 hours as needed for pain maximum 3 tablet(s) per day   Refills:  0        * oxyCODONE-acetaminophen 5-325 MG per tablet   Commonly known as:  PERCOCET   Dose:  1-2 tablet   What changed:  You were already taking a medication with the same name, and this prescription was added. Make sure you understand how and when to take each.   Quantity:  15 tablet        Take 1-2 tablets by mouth every 6 hours as needed for moderate to severe pain   Refills:  0        * Notice:  This list has 2 medication(s) that are the same as other medications prescribed for you. Read the directions carefully, and ask your doctor or other care provider to review them with you.      Our records show that you are taking the medicines listed below. If these are incorrect, please call your family doctor or clinic.        Dose / Directions Last dose taken    medroxyPROGESTERone 150 MG/ML injection   Commonly known as:   DEPO-PROVERA   Dose:  150 mg   Quantity:  1 mL        Inject 1 mL (150 mg) into the muscle every 3 months   Refills:  3        SUMAtriptan 100 MG tablet   Commonly known as:  IMITREX   Dose:  100 mg   Quantity:  18 tablet        Take 1 tablet (100 mg) by mouth at onset of headache for migraine May repeat in 2 hours. Max 2 tablets/24 hours.   Refills:  1                Prescriptions were sent or printed at these locations (2 Prescriptions)                   Other Prescriptions                Printed at Department/Unit printer (2 of 2)         ondansetron (ZOFRAN ODT) 4 MG ODT tab               oxyCODONE-acetaminophen (PERCOCET) 5-325 MG per tablet                Procedures and tests performed during your visit     Basic metabolic panel    CBC with platelets differential    Erythrocyte sedimentation rate auto    HCG qualitative urine    Peripheral IV catheter    UA with Microscopic      Orders Needing Specimen Collection     None      Pending Results     No orders found from 9/8/2017 to 9/11/2017.            Pending Culture Results     No orders found from 9/8/2017 to 9/11/2017.            Pending Results Instructions     If you had any lab results that were not finalized at the time of your Discharge, you can call the ED Lab Result RN at [phone]. You will be contacted by this team for any positive Lab results or changes in treatment. The nurses are available 7 days a week from 10A to 6:30P.  You can leave a message 24 hours per day and they will return your call.        Test Results From Your Hospital Stay        9/10/2017 10:29 AM      Component Results     Component Value Ref Range & Units Status    WBC 10.5 4.0 - 11.0 10e9/L Final    RBC Count 4.66 3.8 - 5.2 10e12/L Final    Hemoglobin 13.9 11.7 - 15.7 g/dL Final    Hematocrit 41.2 35.0 - 47.0 % Final    MCV 88 78 - 100 fl Final    MCH 29.8 26.5 - 33.0 pg Final    MCHC 33.7 31.5 - 36.5 g/dL Final    RDW 12.7 10.0 - 15.0 % Final    Platelet Count 268 150 - 450  10e9/L Final    Diff Method Automated Method  Final    % Neutrophils 55.1 % Final    % Lymphocytes 35.6 % Final    % Monocytes 6.3 % Final    % Eosinophils 2.1 % Final    % Basophils 0.6 % Final    % Immature Granulocytes 0.3 % Final    Nucleated RBCs 0 0 /100 Final    Absolute Neutrophil 5.8 1.6 - 8.3 10e9/L Final    Absolute Lymphocytes 3.7 0.8 - 5.3 10e9/L Final    Absolute Monocytes 0.7 0.0 - 1.3 10e9/L Final    Absolute Eosinophils 0.2 0.0 - 0.7 10e9/L Final    Absolute Basophils 0.1 0.0 - 0.2 10e9/L Final    Abs Immature Granulocytes 0.0 0 - 0.4 10e9/L Final    Absolute Nucleated RBC 0.0  Final         9/10/2017 10:43 AM      Component Results     Component Value Ref Range & Units Status    Sodium 141 133 - 144 mmol/L Final    Potassium 4.0 3.4 - 5.3 mmol/L Final    Chloride 107 94 - 109 mmol/L Final    Carbon Dioxide 29 20 - 32 mmol/L Final    Anion Gap 5 3 - 14 mmol/L Final    Glucose 113 (H) 70 - 99 mg/dL Final    Urea Nitrogen 13 7 - 30 mg/dL Final    Creatinine 0.66 0.52 - 1.04 mg/dL Final    GFR Estimate >90 >60 mL/min/1.7m2 Final    Non  GFR Calc    GFR Estimate If Black >90 >60 mL/min/1.7m2 Final    African American GFR Calc    Calcium 8.8 8.5 - 10.1 mg/dL Final         9/10/2017 10:58 AM      Component Results     Component Value Ref Range & Units Status    Color Urine Yellow  Final    Appearance Urine Cloudy  Final    Glucose Urine Negative NEG^Negative mg/dL Final    Bilirubin Urine Negative NEG^Negative Final    Ketones Urine Negative NEG^Negative mg/dL Final    Specific Gravity Urine 1.013 1.003 - 1.035 Final    Blood Urine Small (A) NEG^Negative Final    pH Urine 8.0 (H) 5.0 - 7.0 pH Final    Protein Albumin Urine Negative NEG^Negative mg/dL Final    Urobilinogen mg/dL 0.0 0.0 - 2.0 mg/dL Final    Nitrite Urine Negative NEG^Negative Final    Leukocyte Esterase Urine Negative NEG^Negative Final    Source Midstream Urine  Final    WBC Urine 3 (H) 0 - 2 /HPF Final    RBC Urine 3 (H)  0 - 2 /HPF Final    Bacteria Urine Moderate (A) NEG^Negative /HPF Final    Squamous Epithelial /HPF Urine 1 0 - 1 /HPF Final    Mucous Urine Present (A) NEG^Negative /LPF Final    Amorphous Crystals Few (A) NEG^Negative /HPF Final         9/10/2017 10:57 AM      Component Results     Component Value Ref Range & Units Status    HCG Qual Urine Negative NEG^Negative Final    This test is for screening purposes.  Results should be interpreted along with   the clinical picture.  Confirmation testing is available if warranted by   ordering [id number], HCG Quantitative Pregnancy.           9/10/2017 12:58 PM      Component Results     Component Value Ref Range & Units Status    Sed Rate 6 0 - 20 mm/h Final                Clinical Quality Measure: Blood Pressure Screening     Your blood pressure was checked while you were in the emergency department today. The last reading we obtained was  BP: 116/66 . Please read the guidelines below about what these numbers mean and what you should do about them.  If your systolic blood pressure (the top number) is less than 120 and your diastolic blood pressure (the bottom number) is less than 80, then your blood pressure is normal. There is nothing more that you need to do about it.  If your systolic blood pressure (the top number) is 120-139 or your diastolic blood pressure (the bottom number) is 80-89, your blood pressure may be higher than it should be. You should have your blood pressure rechecked within a year by a primary care provider.  If your systolic blood pressure (the top number) is 140 or greater or your diastolic blood pressure (the bottom number) is 90 or greater, you may have high blood pressure. High blood pressure is treatable, but if left untreated over time it can put you at risk for heart attack, stroke, or kidney failure. You should have your blood pressure rechecked by a primary care provider within the next 4 weeks.  If your provider in the emergency department today  gave you specific instructions to follow-up with your doctor or provider even sooner than that, you should follow that instruction and not wait for up to 4 weeks for your follow-up visit.        Thank you for choosing [facility]       Thank you for choosing [facility] for your care. Our goal is always to provide you with excellent care. Hearing back from our patients is one way we can continue to improve our services. Please take a few minutes to complete the written survey that you may receive in the mail after you visit with us. Thank you!        [facility] [employer] Information     [employer] gives you secure access to your electronic health record. If you see a primary care provider, you can also send messages to your care team and make appointments. If you have questions, please call your primary care clinic.  If you do not have a primary care provider, please call [phone] and they will assist you.        Care EveryWhere ID     This is your Care EveryWhere ID. This could be used by other organizations to access your [facility] medical records  [id number]        Equal Access to Services     ANDERSON DARNELL AH: Tonny Dodd, warachel may, qacrystal aguirre, héctor yanez. So [facility] [phone].    ATENCIÓN: Si habla español, tiene a myers disposición servicios gratuitos de asistencia lingüística. Llame al [phone].    We comply with applicable federal civil rights laws and [place] laws. We do not discriminate on the basis of race, color, national origin, age, disability sex, sexual orientation or gender identity.            After Visit Summary       This is your record. Keep this with you and show to your community pharmacist(s) and doctor(s) at your next visit.

## 2017-09-10 NOTE — ED AVS SNAPSHOT
[facility] Emergency Department    [address]    [facility] [address]    Phone:  [phone]    Fax:  [phone]                                       Mary Lerma   MRN: [id number]    Department:  [facility] Emergency Department   Date of Visit:  9/10/2017           After Visit Summary Signature Page     I have received my discharge instructions, and my questions have been answered. I have discussed any challenges I see with this plan with the nurse or doctor.    ..........................................................................................................................................  Patient/Patient Representative Signature      ..........................................................................................................................................  Patient Representative Print Name and Relationship to Patient    ..................................................               ................................................  Date                                            Time    ..........................................................................................................................................  Reviewed by Signature/Title    ...................................................              ..............................................  Date                                                            Time

## 2017-09-11 ENCOUNTER — CARE COORDINATION (OUTPATIENT)
Dept: CARE COORDINATION | Facility: CLINIC | Age: 32
End: 2017-09-11

## 2017-09-11 NOTE — LETTER
[facility] CARE COORDINATION  [facility]   [address] [place] [address]    September 11, 2017    Mary Lerma  [address]  [facility] [address]    Dear Mary ,  I am the Clinic Care Coordinator that works with your primary care provider's clinic. I wanted to introduce myself and provide you with my contact information for you to be able to call me with any questions or concerns. I wanted to thank you for spending the time to talk with me.  Below is a description of what Clinic Care Coordination is and how I can further assist you.     The Clinic Care Coordinator role is a Registered Nurse and/or  who understands the health care system. The goal of Clinic Care Coordination is to help you manage your health and improve access to the [facility] system in the most efficient manner.  The Registered Nurse can assist you in meeting your health care goals by providing education, coordinating services, and strengthening the communication among your providers. The  can assist you with financial, behavioral, psychosocial, and chemical dependency and counseling/psychiatric resources.    Please feel free to keep this letter and contact information to contact me at [phone] with any further questions or concerns that may arise. We at [facility] are focused on providing you with the highest-quality healthcare experience possible and that all starts with you.     Sincerely,     Mary Lott Care Coordinator RN  [facility]  [phone]  September 11, 2017     Enclosed: I have enclosed a copy of the Complex Care Plan. This has helpful information and goals that we have talked about. Please keep this in an easy to access place to use as needed.

## 2017-09-11 NOTE — PROGRESS NOTES
Letter completed and placed up at the  for patient to . Patient was notified of this. Nichole Antonio

## 2017-09-11 NOTE — LETTER
[facility]  Complex Care Plan  About Me  Patient Name:  Mary Lerma    YOB: 1985  Age:   31 year old   [facility] MRN: [id number] Telephone Information:     Home Phone [phone]   Mobile [phone]       Address:    [address]  [facility] [address] Email address:  [email or web address]      Emergency Contact(s)  Name Relationship Lgl Grd Work Phone Home Phone Mobile Phone   [place]. EDWARDO EDWARDS Mother  none [phone]           Primary language:  English     needed? No   [facility] Language Services:  [phone] op. 1  Other communication barriers:  (NA)  Preferred Method of Communication:  Phone  Current living arrangement: I live in a private home with spouse  Mobility Status/ Medical Equipment: Independent  Other information to know about me:    Health Maintenance  Health Maintenance Reviewed: Up to date    My Access Plan  Medical Emergency 911   Primary Clinic Line [facility]- [phone]   24 Hour Appointment Line [phone] or  [phone]) (toll-free)   24 Hour Nurse Line [phone] (toll-free)   Preferred Urgent Care [facility] [phone] ([facility] [phone])   Preferred Hospital [facility]  [phone]   Preferred Pharmacy [facility] [id number] [place] [address] [address] [facility] [address]     Behavioral Health Crisis Line The National Suicide Prevention Lifeline at 1-262.577.4337 or 911     My Care Team Members   Elena Mcdonough NP PCP - General Nurse Practitioner - Family 2/23/17    Phone: [phone] Fax: [phone]         Jacques Lawson MD MD Otolaryngology 5/4/17    Phone: [phone] Fax: [phone]         Mary Lott RN Clinic Care Coordinator  9/11/17    Phone: [phone] Fax: [phone]        My Care Plans  Self Management and Treatment Plan  Goals and (Comments)  Goal  #1: MAKE APPT. WITH NEUROLOGY   Goal #2  USE MIGRAINE ACTION PLAN TO AID WITH HEADACHE PREVENTION     Action Plans on File: Migraine  Advance Care Plans/Directives Type:   Type Advanced Care Plans/Directives:  (NA)    My Medical and Care Information  Problem List   Patient Active Problem List   Diagnosis     Overweight     Acute thoracic back pain, unspecified back pain laterality     Encounter for surveillance of injectable contraceptive        Current Medications and Allergies:  See printed Medication Report.

## 2017-09-11 NOTE — LETTER
[facility]          [address] [place] [address]                                                                                                       [phone]    2017      Re:Mary Lerma  [address]  [facility] [address]  :1985        To whom is may concern,    Mary is under my professional care for medical reasons and was seen on 09/10/2017.  She may return to work on 2017 with no restrictions.    If you have any questions or concerns, please call me or my nurse at [phone].        Sincerely,    Elena Mcdonough NP

## 2017-09-11 NOTE — PROGRESS NOTES
Clinic Care Coordination Contact  OUTREACH    Referral Information:  Referral Source: ED Follow-Up  Reason for Contact: ER follow up headache   Care Conference: No     Universal Utilization:   ED Visits in last year: 3  Hospital visits in last year: 0  Last PCP appointment: 09/07/17  Missed Appointments:  (NO CONCERNS )  Concerns: NONE   Multiple Providers or Specialists: YES     Clinical Concerns:  Current Medical Concerns: Patient in ER yesterday for headache - she continues with headache and nausea today. She has not picked up antinausea medication yet.   CCRN offered to help schedule an appt. In clinic with pcp however she declines this at this time - patient states she has an appt. with ENT next week. Patient would like to wait until this appt. 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 Patient has off Tuesday through Friday. CCRN will send note to pcp and patient will wait call from either CCRN/clinic to advise this has been done.     Current Behavioral Concerns: no concerns noted on outreach   PHQ-9 score:  No flowsheet data found. no PHQ on file or history of depression noted     Clinical Pathway Name: None    Medication Management:  Has not yet picked up antinausea medication - however states she will do so today     Functional Status:  Mobility Status: Independent  Equipment Currently Used at Home: none  Transportation: drives   Works as  [employer] for [employer] - shift is from 5:00 pm - 1:30 am      Psychosocial:  Current living arrangement: I live in a private home with spouse  Financial/Insurance: no concerns noted      Resources and  Interventions:  Current Resources:  (NA);  (NA)  PAS Number:  (NA)  Senior Linkage Line Referral Placed:  (NA)  Advanced Care Plans/Directives on file:: No  Referrals Placed:  (NA)     Goals:   Goal 1 Statement: MAKE APPT. WITH NEUROLOGY   Goal 1 Progression Percent: 0%  Goal 1 Progression Date: 09/11/17    Patient/Caregiver understanding: yes   Frequency of Care Coordination: 1 WEEK   Upcoming appointment:  (NONE WITH PCP - PATIENT WANTS TO CALL BACK TO SCHEDULE)  ENT appt. Next week      Plan:   Patient to schedule appt. With neurology - she will call back to schedule in clinic with pcp if she changes her mind   CCRN will send note to pcp asking for work note for this evening   CCRN will mail [facility] care plan and care coordination letter   Patient has CCRN contact information and will call back with questions/s concerns     Mary Lott Care Coordinator RN  [facility]  [phone]  September 11, 2017

## 2017-09-18 ENCOUNTER — OFFICE VISIT (OUTPATIENT)
Dept: OTOLARYNGOLOGY | Facility: CLINIC | Age: 32
End: 2017-09-18

## 2017-09-18 DIAGNOSIS — H92.13 OTORRHEA, BILATERAL: Primary | ICD-10-CM

## 2017-09-18 RX ORDER — OFLOXACIN 3 MG/ML
5 SOLUTION AURICULAR (OTIC) 2 TIMES DAILY
Qty: 5 ML | Refills: 0 | Status: SHIPPED | OUTPATIENT
Start: 2017-09-18 | End: 2017-09-28

## 2017-09-18 ASSESSMENT — PAIN SCALES - GENERAL: PAINLEVEL: MODERATE PAIN (5)

## 2017-09-18 NOTE — PATIENT INSTRUCTIONS
Please start using the ear drops Dr Lawson, follow up as recommended.   Ubaldo Maguire ,RN  [phone]

## 2017-09-18 NOTE — PROGRESS NOTES
HISTORY OF PRESENT ILLNESS:  The patient is here to see us today.  She has noted some plugging of her left ear and a little bit of blood in both ears.  Otherwise, she feels that she has healed well from her tonsillectomy.      PHYSICAL EXAMINATION:  She is in no distress, alert and appropriate.  Breathing without difficulty or stridor.  Eyes are anicteric.  Skin of the head and neck appears normal.  Examination of the ears shows right ear with normal canal and tympanic membrane with tube in place and a small amount of bloody crust around it.  Examination of the left ear shows the tube plugged by bloody crusting.  Otherwise, no evidence of infection or active bleeding.      PLAN:  The plan will be for Floxin drops to the left ear over the next couple of weeks.  We will see her back after that.

## 2017-09-18 NOTE — NURSING NOTE
Chief Complaint   Patient presents with     RECHECK     Left ear pain and occasional bleeding

## 2017-09-18 NOTE — LETTER
9/18/2017       RE: Mary Lerma  [address]  [facility] [address]     Dear Colleague,    Thank you for referring your patient, Mary Lerma, to the [facility] EAR NOSE AND THROAT at [facility]. Please see a copy of my visit note below.    HISTORY OF PRESENT ILLNESS:  The patient is here to see us today.  She has noted some plugging of her left ear and a little bit of blood in both ears.  Otherwise, she feels that she has healed well from her tonsillectomy.      PHYSICAL EXAMINATION:  She is in no distress, alert and appropriate.  Breathing without difficulty or stridor.  Eyes are anicteric.  Skin of the head and neck appears normal.  Examination of the ears shows right ear with normal canal and tympanic membrane with tube in place and a small amount of bloody crust around it.  Examination of the left ear shows the tube plugged by bloody crusting.  Otherwise, no evidence of infection or active bleeding.      PLAN:  The plan will be for Floxin drops to the left ear over the next couple of weeks.  We will see her back after that.         Again, thank you for allowing me to participate in the care of your patient.      Sincerely,    Jacques Lawson MD

## 2017-09-18 NOTE — MR AVS SNAPSHOT
After Visit Summary   9/18/2017    Mary Lerma    MRN: [id number]           Patient Information     Date Of Birth          1985        Visit Information        Provider Department      9/18/2017 12:00 PM Jacques Lawson MD [facility] Ear Nose and Throat        Today's Diagnoses     Otorrhea, bilateral    -  1      Care Instructions    Please start using the ear drops Dr Lawson, follow up as recommended.   Ubaldo Maguire ,RN  [phone]              Follow-ups after your visit        Who to contact     Please call your clinic at [phone] to:    Ask questions about your health    Make or cancel appointments    Discuss your medicines    Learn about your test results    Speak to your doctor   If you have compliments or concerns about an experience at your clinic, or if you wish to file a complaint, please contact [facility] Patient Relations at [phone] or email us at [email or web address]         Additional Information About Your Visit        [employer] Information     [employer] gives you secure access to your electronic health record. If you see a primary care provider, you can also send messages to your care team and make appointments. If you have questions, please call your primary care clinic.  If you do not have a primary care provider, please call [phone] and they will assist you.      [employer] is an electronic gateway that provides easy, online access to your medical records. With [employer], you can request a clinic appointment, read your test results, renew a prescription or communicate with your care team.     To access your existing account, please contact your [facility] or call [phone] for assistance.        Care EveryWhere ID     This is your Care EveryWhere ID. This could be used by other organizations to access your [place] medical records  [id number]         Blood Pressure from Last 3 Encounters:    09/10/17 114/70   09/08/17 112/60   09/07/17 129/85    Weight from Last 3 Encounters:   09/07/17 78.5 kg (173 lb)   09/07/17 78.5 kg (173 lb)   09/06/17 78.5 kg (173 lb 1.6 oz)              Today, you had the following     No orders found for display         Today's Medication Changes          These changes are accurate as of: 9/18/17 11:59 PM.  If you have any questions, ask your nurse or doctor.               Start taking these medicines.        Dose/Directions    ofloxacin 0.3 % otic solution   Commonly known as:  FLOXIN   Used for:  Otorrhea, bilateral        Dose:  5 drop   Place 5 drops in ear(s) 2 times daily for 10 days   Quantity:  5 mL   Refills:  0            Where to get your medicines      These medications were sent to [facility] [id number] [place] [address] [facility] [address]  [address] [facility] [place] [address]     Phone:  [phone]     ofloxacin 0.3 % otic solution                Primary Care Provider Office Phone # Fax #    Elena BARAJAS Ceasar, EDENILSON [phone]       [facility] [place] [address] [address] [facility] [address]        Equal Access to Services     ANDERSON DARNELL AH: Hadii adri ku hadasho Soomaali, waaxda luqadaha, qaybta kaalmada adeegyada, waxay idiin haymaria esthern yue yanez. So [facility] [phone].    ATENCIÓN: Si habla español, tiene a myers disposición servicios gratuitos de asistencia lingüística. [facility] [phone].    We comply with applicable federal civil rights laws and [place] laws. We do not discriminate on the basis of race, color, national origin, age, disability sex, sexual orientation or gender identity.            Thank you!     Thank you for choosing [facility] EAR NOSE AND THROAT  for your care. Our goal is always to provide you with excellent care. Hearing back from our patients is one way we can continue to improve our services. Please take a few minutes to complete the written survey that you may receive in the mail  after your visit with us. Thank you!             Your Updated Medication List - Protect others around you: Learn how to safely use, store and throw away your medicines at www.disposemymeds.org.          This list is accurate as of: 9/18/17 11:59 PM.  Always use your most recent med list.                   Brand Name Dispense Instructions for use Diagnosis    medroxyPROGESTERone 150 MG/ML injection    DEPO-PROVERA    1 mL    Inject 1 mL (150 mg) into the muscle every 3 months    Encounter for initial prescription of injectable contraceptive       ofloxacin 0.3 % otic solution    FLOXIN    5 mL    Place 5 drops in ear(s) 2 times daily for 10 days    Otorrhea, bilateral       * oxyCODONE-acetaminophen 5-325 MG per tablet    PERCOCET    20 tablet    Take 1 tablet by mouth every 6 hours as needed for pain maximum 3 tablet(s) per day    Temporal headache       * oxyCODONE-acetaminophen 5-325 MG per tablet    PERCOCET    15 tablet    Take 1-2 tablets by mouth every 6 hours as needed for moderate to severe pain        SUMAtriptan 100 MG tablet    IMITREX    18 tablet    Take 1 tablet (100 mg) by mouth at onset of headache for migraine May repeat in 2 hours. Max 2 tablets/24 hours.    Other migraine without status migrainosus, not intractable       * Notice:  This list has 2 medication(s) that are the same as other medications prescribed for you. Read the directions carefully, and ask your doctor or other care provider to review them with you.

## 2017-10-24 ENCOUNTER — OFFICE VISIT (OUTPATIENT)
Dept: FAMILY MEDICINE | Facility: CLINIC | Age: 32
End: 2017-10-24
Payer: COMMERCIAL

## 2017-10-24 VITALS
RESPIRATION RATE: 16 BRPM | OXYGEN SATURATION: 98 % | DIASTOLIC BLOOD PRESSURE: 64 MMHG | SYSTOLIC BLOOD PRESSURE: 110 MMHG | HEART RATE: 88 BPM | HEIGHT: 62 IN | BODY MASS INDEX: 32.2 KG/M2 | TEMPERATURE: 97.1 F | WEIGHT: 175 LBS

## 2017-10-24 DIAGNOSIS — M25.50 MULTIPLE JOINT PAIN: Primary | ICD-10-CM

## 2017-10-24 PROCEDURE — 86038 ANTINUCLEAR ANTIBODIES: CPT | Performed by: NURSE PRACTITIONER

## 2017-10-24 PROCEDURE — 99213 OFFICE O/P EST LOW 20 MIN: CPT | Performed by: NURSE PRACTITIONER

## 2017-10-24 PROCEDURE — 84443 ASSAY THYROID STIM HORMONE: CPT | Performed by: NURSE PRACTITIONER

## 2017-10-24 PROCEDURE — 86140 C-REACTIVE PROTEIN: CPT | Performed by: NURSE PRACTITIONER

## 2017-10-24 PROCEDURE — 36415 COLL VENOUS BLD VENIPUNCTURE: CPT | Performed by: NURSE PRACTITIONER

## 2017-10-24 PROCEDURE — 82306 VITAMIN D 25 HYDROXY: CPT | Performed by: NURSE PRACTITIONER

## 2017-10-24 PROCEDURE — 86431 RHEUMATOID FACTOR QUANT: CPT | Performed by: NURSE PRACTITIONER

## 2017-10-24 NOTE — PROGRESS NOTES
SUBJECTIVE:   Mary Lerma is a 31 year old female who presents to clinic today for the following health issues:    Here with complaints of joint pain that is constant and all day long. Not sleeping well and will be sleeping 4 hours per night. Concerned about fibromyalgia. Family history of fibromyalgia in her grandmothers along with rheumatoid arthritis. No swelling of joints or erythema but complains of pain with light palpation. Limited exercise. Mood is low and  states she is irritable all of the time.           Problem list and histories reviewed & adjusted, as indicated.  Additional history: none    Patient Active Problem List   Diagnosis     Overweight     Acute thoracic back pain, unspecified back pain laterality     Encounter for surveillance of injectable contraceptive     Past Surgical History:   Procedure Laterality Date     CHOLECYSTECTOMY       GI SURGERY  2015    gastric sleeve     MYRINGOTOMY, INSERT TUBE BILATERAL, COMBINED Bilateral 7/10/2017    Procedure: COMBINED MYRINGOTOMY, INSERT TUBE BILATERAL;  Bilateral Pressure Equalization Tubes placement and Bilateral Tonsillectomy;  Surgeon: Jacques Lawson MD;  Location:  OR     ORTHOPEDIC SURGERY       TONSILLECTOMY Bilateral 7/10/2017    Procedure: TONSILLECTOMY;;  Surgeon: Jacques Lawson MD;  Location:  OR       Social History   Substance Use Topics     Smoking status: Former Smoker     Types: Cigarettes     Quit date: 2/9/2017     Smokeless tobacco: Never Used     Alcohol use No     Family History   Problem Relation Age of Onset     Family History Negative Mother      DIABETES Father      Hypertension Father              Reviewed and updated as needed this visit by clinical staff  Tobacco  Allergies  Meds  Problems  Med Hx  Surg Hx  Fam Hx  Soc Hx        Reviewed and updated as needed this visit by Provider  Allergies  Meds  Problems         ROS:  Constitutional, HEENT, cardiovascular, pulmonary, gi and gu systems are  "negative, except as otherwise noted.      OBJECTIVE:   /64 (BP Location: Right arm, Patient Position: Chair, Cuff Size: Adult Regular)  Pulse 88  Temp 97.1  F (36.2  C) (Oral)  Resp 16  Ht 5' 2\" (1.575 m)  Wt 175 lb (79.4 kg)  SpO2 98%  Breastfeeding? No  BMI 32.01 kg/m2  Body mass index is 32.01 kg/(m^2).  GENERAL: alert, no distress, over weight and fatigued  RESP: lungs clear to auscultation - no rales, rhonchi or wheezes  CV: regular rate and rhythm, normal S1 S2, no S3 or S4, no murmur, click or rub, no peripheral edema and peripheral pulses strong  MS: sensitive to touch.   SKIN: no suspicious lesions or rashes  NEURO: Normal strength and tone, mentation intact and speech normal  PSYCH: mentation appears normal, affect normal/bright        ASSESSMENT/PLAN:             1. Multiple joint pain  Will check labs for now. Will start on cymbalta 30 mg once a day for one week and then may increase gradually.   - TSH with free T4 reflex  - CRP inflammation  - 25 Hydroxyvitamin D2 and D3  - Anti Nuclear Maryjane IgG by IFA with Reflex  - Rheumatoid factor        Elena Mcdonough, NP  [facility]  "

## 2017-10-24 NOTE — NURSING NOTE
"Chief Complaint   Patient presents with     Consult       Initial /64 (BP Location: Right arm, Patient Position: Chair, Cuff Size: Adult Regular)  Pulse 88  Temp 97.1  F (36.2  C) (Oral)  Resp 16  Ht 5' 2\" (1.575 m)  Wt 175 lb (79.4 kg)  SpO2 98%  Breastfeeding? No  BMI 32.01 kg/m2 Estimated body mass index is 32.01 kg/(m^2) as calculated from the following:    Height as of this encounter: 5' 2\" (1.575 m).    Weight as of this encounter: 175 lb (79.4 kg).  Medication Reconciliation: complete     Wilber Hyatt CMA      "

## 2017-10-24 NOTE — MR AVS SNAPSHOT
"              After Visit Summary   10/24/2017    Mary Lerma    MRN: [id number]           Patient Information     Date Of Birth          1985        Visit Information        Provider Department      10/24/2017 1:30 PM Elena Mcdonough NP [facility]        Today's Diagnoses     Multiple joint pain    -  1       Follow-ups after your visit        Who to contact     If you have questions or need follow up information about today's clinic visit or your schedule please contact [facility] directly at [phone].  Normal or non-critical lab and imaging results will be communicated to you by [employer], letter or phone within 4 business days after the clinic has received the results. If you do not hear from us within 7 days, please contact the clinic through [employer] or phone. If you have a critical or abnormal lab result, we will notify you by phone as soon as possible.  Submit refill requests through [employer] or call your pharmacy and they will forward the refill request to us. Please allow 3 business days for your refill to be completed.          Additional Information About Your Visit        MyChart Information     [employer] gives you secure access to your electronic health record. If you see a primary care provider, you can also send messages to your care team and make appointments. If you have questions, please call your primary care clinic.  If you do not have a primary care provider, please call [phone] and they will assist you.        Care EveryWhere ID     This is your Care EveryWhere ID. This could be used by other organizations to access your [place] medical records  [id number]        Your Vitals Were     Pulse Temperature Respirations Height Pulse Oximetry Breastfeeding?    88 97.1  F (36.2  C) (Oral) 16 5' 2\" (1.575 m) 98% No    BMI (Body Mass Index)                   32.01 kg/m2            Blood Pressure from Last 3 Encounters:   10/24/17 110/64   09/10/17 114/70 "   09/08/17 112/60    Weight from Last 3 Encounters:   10/24/17 175 lb (79.4 kg)   09/07/17 173 lb (78.5 kg)   09/07/17 173 lb (78.5 kg)              We Performed the Following     25 Hydroxyvitamin D2 and D3     RAVINDRA w/Reflex ([facility])     CRP inflammation     Rheumatoid Arthritis Factor ([facility])     TSH with free T4 reflex        Primary Care Provider Office Phone # Fax #    Elena McdonoughEDENILSON [phone]       [facility] [address] [address] [facility] [address]        Equal Access to Services     [facility] : Hadii adri quispe hadasho Soomaali, waaxda luqadaha, qaybta kaalmada adeamyyada, héctor tseward . So [facility] [phone].    ATENCIÓN: Si habla español, tiene a myers disposición servicios gratuitos de asistencia lingüística. Llame al [phone].    We comply with applicable federal civil rights laws and [place] laws. We do not discriminate on the basis of race, color, national origin, age, disability, sex, sexual orientation, or gender identity.            Thank you!     Thank you for choosing [facility]  for your care. Our goal is always to provide you with excellent care. Hearing back from our patients is one way we can continue to improve our services. Please take a few minutes to complete the written survey that you may receive in the mail after your visit with us. Thank you!             Your Updated Medication List - Protect others around you: Learn how to safely use, store and throw away your medicines at www.disposemymeds.org.          This list is accurate as of: 10/24/17  2:03 PM.  Always use your most recent med list.                   Brand Name Dispense Instructions for use Diagnosis    medroxyPROGESTERone 150 MG/ML injection    DEPO-PROVERA    1 mL    Inject 1 mL (150 mg) into the muscle every 3 months    Encounter for initial prescription of injectable contraceptive       SUMAtriptan 100 MG tablet    IMITREX    18 tablet    Take 1  tablet (100 mg) by mouth at onset of headache for migraine May repeat in 2 hours. Max 2 tablets/24 hours.    Other migraine without status migrainosus, not intractable

## 2017-10-25 LAB
CRP SERPL-MCNC: <2.9 MG/L (ref 0–8)
TSH SERPL DL<=0.005 MIU/L-ACNC: 0.93 MU/L (ref 0.4–4)

## 2017-10-26 LAB
ANA SER QL IF: NEGATIVE
RHEUMATOID FACT SER NEPH-ACNC: <20 IU/ML (ref 0–20)

## 2017-10-27 LAB
DEPRECATED CALCIDIOL+CALCIFEROL SERPL-MC: <26 UG/L (ref 20–75)
VITAMIN D2 SERPL-MCNC: <5 UG/L
VITAMIN D3 SERPL-MCNC: 21 UG/L

## 2017-11-05 ENCOUNTER — CARE COORDINATION (OUTPATIENT)
Dept: CARE COORDINATION | Facility: CLINIC | Age: 32
End: 2017-11-05

## 2017-11-05 NOTE — PROGRESS NOTES
Clinic Care Coordination Contact  [facility]/Voicemail    Referral Source: ED Follow-Up  Clinical Data: Care Coordinator Outreach - f/u migraines/joint pain   Outreach attempted x 1.  Left message on voicemail with call back information and requested return call.  Plan:  Care Coordinator will try to reach patient again in 1-2 business days.    Mary Lott Care Coordinator RN  [facility]  [phone]  November 6, 2017

## 2017-11-07 ENCOUNTER — HOSPITAL ENCOUNTER (EMERGENCY)
Facility: CLINIC | Age: 32
Discharge: HOME | End: 2017-11-07
Attending: EMERGENCY MEDICINE | Admitting: EMERGENCY MEDICINE
Payer: COMMERCIAL

## 2017-11-07 VITALS
WEIGHT: 170 LBS | HEIGHT: 62 IN | RESPIRATION RATE: 18 BRPM | BODY MASS INDEX: 31.28 KG/M2 | OXYGEN SATURATION: 99 % | HEART RATE: 84 BPM | TEMPERATURE: 98.2 F | SYSTOLIC BLOOD PRESSURE: 141 MMHG | DIASTOLIC BLOOD PRESSURE: 78 MMHG

## 2017-11-07 DIAGNOSIS — S61.210A LACERATION OF RIGHT INDEX FINGER WITHOUT FOREIGN BODY WITHOUT DAMAGE TO NAIL, INITIAL ENCOUNTER: ICD-10-CM

## 2017-11-07 PROCEDURE — 99283 EMERGENCY DEPT VISIT LOW MDM: CPT

## 2017-11-07 PROCEDURE — 12001 RPR S/N/AX/GEN/TRNK 2.5CM/<: CPT

## 2017-11-07 RX ORDER — GINSENG 100 MG
CAPSULE ORAL
Status: DISCONTINUED
Start: 2017-11-07 | End: 2017-11-08 | Stop reason: HOSPADM

## 2017-11-07 RX ORDER — LIDOCAINE HYDROCHLORIDE 10 MG/ML
INJECTION, SOLUTION INFILTRATION; PERINEURAL
Status: DISCONTINUED
Start: 2017-11-07 | End: 2017-11-08 | Stop reason: HOSPADM

## 2017-11-07 ASSESSMENT — ENCOUNTER SYMPTOMS
NUMBNESS: 0
WOUND: 1

## 2017-11-07 NOTE — ED AVS SNAPSHOT
[facility] Emergency Department    [address] [facility] [address]    Phone:  [phone]    Fax:  [phone]                                       Mary Lerma   MRN: [id number]    Department:  [facility] Emergency Department   Date of Visit:  11/7/2017           Patient Information     Date Of Birth          1985        Your diagnoses for this visit were:     Laceration of right index finger without foreign body without damage to nail, initial encounter        You were seen by Jazmyn Bahena MD.      Follow-up Information     Follow up with Elena Mcdonough NP. Schedule an appointment as soon as possible for a visit in 1 week.    Specialty:  Nurse Practitioner - Family    Why:  for suture removal (4 total) and wound check    Contact information:    [facility]  [address]  [facility] [address] [phone]          Go to [facility] Emergency Department.    Specialty:  EMERGENCY MEDICINE    Why:  for redness, swelling, drainage from wound or severe pain     Contact information:    [address] [facility] [address] [phone]        Discharge Instructions       Discharge Instructions  Laceration (Cut)    You were seen today for a laceration (cut).  Your provider examined your laceration for any problems such a buried foreign body (like glass, a splinter, or gravel), or injury to blood vessels, tendons, and nerves.  Your provider may have also rinsed and/or scrubbed your laceration to help prevent an infection. It may not be possible to find all problems with your laceration on the first visit; occasionally foreign bodies or a tendon injury can go undetected.    Your laceration may have been closed in one of several ways:    No closure: many wounds will heal just fine without closure.    Stitches: regular stitches that require removal.    Staples: skin staples are often used in the scalp/head.    Wound  adhesive (glue): skin glue can be used for certain lacerations and doesn t require removal.    Wound strips (aka Butterfly bandages or steri-strips): these are bandages that help to close a wound.    Absorbable stitches:  dissolving  stitches that go away on their own and usually don t require removal.    A small percentage of wounds will develop an infection regardless of how well the wound is cared for. Antibiotics are generally not indicated to prevent an infection so are only given for a small number of high-risk wounds. Some lacerations are too high risk to close, and are left open to heal because closure can increase the likelihood that an infection will develop.    Remember that all lacerations, no matter how expertly repaired, will cause scarring. We consider many factors, techniques, and materials, in our efforts to provide the best possible cosmetic outcome.    Generally, every Emergency Department visit should have a follow-up clinic visit with either a primary or a specialty clinic/provider. Please follow-up as instructed by your emergency provider today.     Return to the Emergency Department right away if:    You have more redness, swelling, pain, drainage (pus), a bad smell, or red streaking from your laceration as these symptoms could indicate an infection.    You have a fever of 100.4 F or more.    You have bleeding that you cannot stop at home. If your cut starts to bleed, hold pressure on the bleeding area with a clean cloth or put pressure over the bandage.  If the bleeding does not stop after using constant pressure for 30 minutes, you should return to the Emergency Department for further treatment.    An area past the laceration is cool, pale, or blue compared with the other side, or has a slower return of color when squeezed.    Your dressing seems too tight or starts to get uncomfortable or painful. For children, signs of a problem might be irritability or restlessness.    You have loss of  normal function or use of an area, such as being unable to straighten or bend a finger normally.    You have a numb area past the laceration.    Return to the Emergency Department or see your regular provider if:    The laceration starts to come open.     You have something coming out of the cut or a feeling that there is something in the laceration.    Your wound will not heal, or keeps breaking open. There can always be glass, wood, dirt or other things in any wound.  They will not always show up, even on x-rays.  If a wound does not heal, this may be why, and it is important to follow-up with your regular provider.    Home Care:    Take your dressing off in 12-24 hours, or as instructed by your provider, to check your laceration. Remove the dressing sooner if it seems too tight or painful, or if it is getting numb, tingly, or pale past the dressing.    Gently wash your laceration 1-2 times daily with clean water and mild soap. It is okay to shower or run clean water over the laceration, but do not let the laceration soak in water (no swimming).    If your laceration was closed with wound adhesive or strips: pat it dry and leave it open to the air. For all other repairs: after you wash your laceration, or at least 2 times a day, apply antibiotic ointment (such as Neosporin  or Bacitracin ) to the laceration, then cover it with a Band-Aid  or gauze.    Keep the laceration clean. Wear gloves or other protective clothing if you are around dirt.    Follow-up for removal:    If your wound was closed with staples or regular stitches, they need to be removed according to the instructions and timeline specified by your provider today.    If your wound was closed with absorbable ( dissolving ) sutures, they should fall out, dissolve, or not be visible in about one week. If they are still visible, then they should be removed according to the instructions and timeline specified by your provider today.    Scars:  To help  minimize scarring:    Wear sunscreen over the healed laceration when out in the sun.    Massage the area regularly once healed.    You may apply Vitamin E to the healed wound.    Wait. Scars improve in appearance over months and years.    If you were given a prescription for medicine here today, be sure to read all of the information (including the package insert) that comes with your prescription.  This will include important information about the medicine, its side effects, and any warnings that you need to know about.  The pharmacist who fills the prescription can provide more information and answer questions you may have about the medicine.  If you have questions or concerns that the pharmacist cannot address, please call or return to the Emergency Department.       Remember that you can always come back to the Emergency Department if you are not able to see your regular provider in the amount of time listed above, if you get any new symptoms, or if there is anything that worries you.      24 Hour Appointment Hotline       To make an appointment at any [facility], call [phone] ([phone]). If you don't have a family doctor or clinic, we will help you find one. [facility] clinics are conveniently located to serve the needs of you and your family.             Review of your medicines      Our records show that you are taking the medicines listed below. If these are incorrect, please call your family doctor or clinic.        Dose / Directions Last dose taken    DULoxetine 30 MG EC capsule   Commonly known as:  CYMBALTA   Dose:  30 mg   Quantity:  30 capsule        Take 1 capsule (30 mg) by mouth daily   Refills:  0        medroxyPROGESTERone 150 MG/ML injection   Commonly known as:  DEPO-PROVERA   Dose:  150 mg   Quantity:  1 mL        Inject 1 mL (150 mg) into the muscle every 3 months   Refills:  3        SUMAtriptan 100 MG tablet   Commonly known as:  IMITREX   Dose:  100 mg   Quantity:  18 tablet         Take 1 tablet (100 mg) by mouth at onset of headache for migraine May repeat in 2 hours. Max 2 tablets/24 hours.   Refills:  1                Orders Needing Specimen Collection     None      Pending Results     No orders found from 11/5/2017 to 11/8/2017.            Pending Culture Results     No orders found from 11/5/2017 to 11/8/2017.            Pending Results Instructions     If you had any lab results that were not finalized at the time of your Discharge, you can call the ED Lab Result RN at [phone]. You will be contacted by this team for any positive Lab results or changes in treatment. The nurses are available 7 days a week from 10A to 6:30P.  You can leave a message 24 hours per day and they will return your call.        Test Results From Your Hospital Stay               Clinical Quality Measure: Blood Pressure Screening     Your blood pressure was checked while you were in the emergency department today. The last reading we obtained was  BP: 141/78 . Please read the guidelines below about what these numbers mean and what you should do about them.  If your systolic blood pressure (the top number) is less than 120 and your diastolic blood pressure (the bottom number) is less than 80, then your blood pressure is normal. There is nothing more that you need to do about it.  If your systolic blood pressure (the top number) is 120-139 or your diastolic blood pressure (the bottom number) is 80-89, your blood pressure may be higher than it should be. You should have your blood pressure rechecked within a year by a primary care provider.  If your systolic blood pressure (the top number) is 140 or greater or your diastolic blood pressure (the bottom number) is 90 or greater, you may have high blood pressure. High blood pressure is treatable, but if left untreated over time it can put you at risk for heart attack, stroke, or kidney failure. You should have your blood pressure rechecked by a primary care  provider within the next 4 weeks.  If your provider in the emergency department today gave you specific instructions to follow-up with your doctor or provider even sooner than that, you should follow that instruction and not wait for up to 4 weeks for your follow-up visit.        Thank you for choosing [facility]       Thank you for choosing [facility] for your care. Our goal is always to provide you with excellent care. Hearing back from our patients is one way we can continue to improve our services. Please take a few minutes to complete the written survey that you may receive in the mail after you visit with us. Thank you!        [facility] Information     [facility] gives you secure access to your electronic health record. If you see a primary care provider, you can also send messages to your care team and make appointments. If you have questions, please call your primary care clinic.  If you do not have a primary care provider, please call [phone] and they will assist you.        Care EveryWhere ID     This is your Care EveryWhere ID. This could be used by other organizations to access your [facility] medical records  [id number]        Equal Access to Services     ANDERSON DARNELL : Hadii adri kruseo Sojhonny, waaxda luqadaha, qaybta kaalmamoriah aguirre, héctor yanez. So [facility] [phone].    ATENCIÓN: Si habla español, tiene a myers disposición servicios gratuitos de asistencia lingüística. Llame al [phone].    We comply with applicable federal civil rights laws and [place] laws. We do not discriminate on the basis of race, color, national origin, age, disability, sex, sexual orientation, or gender identity.            After Visit Summary       This is your record. Keep this with you and show to your community pharmacist(s) and doctor(s) at your next visit.

## 2017-11-07 NOTE — ED AVS SNAPSHOT
[facility] Emergency Department    [address]    [facility] [address]    Phone:  [phone]    Fax:  [phone]                                       Mary Lerma   MRN: [id number]    Department:  [facility] Emergency Department   Date of Visit:  11/7/2017           After Visit Summary Signature Page     I have received my discharge instructions, and my questions have been answered. I have discussed any challenges I see with this plan with the nurse or doctor.    ..........................................................................................................................................  Patient/Patient Representative Signature      ..........................................................................................................................................  Patient Representative Print Name and Relationship to Patient    ..................................................               ................................................  Date                                            Time    ..........................................................................................................................................  Reviewed by Signature/Title    ...................................................              ..............................................  Date                                                            Time

## 2017-11-08 NOTE — PROGRESS NOTES
"Clinic Care Coordination Contact  OUTREACH    Referral Information:  Referral Source: ED Follow-Up  Reason for Contact: ER follow up   Care Conference: No     Universal Utilization:   ED Visits in last year: 3  Hospital visits in last year: 0  Last PCP appointment: 10/24/17  Missed Appointments:  (NO CONCERNS )  Concerns: NONE   Multiple Providers or Specialists: YES     Clinical Concerns:  Current Medical Concerns:   Migraines - patient has followed up with neurology. She has not had a headache for \"a while\"   Joint pain - patient states that she has been diagnosed with fibromyalgia - she was started on cymbalta and so far does not seem to help with the pain. Pain in Hips/Back. Has not been taking cymbalta for very long - will give a bit more time   Laceration - patient in ER last night for laceration to right 2nd finger - 4 sutures placed. Advised nurse only appt. 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 If drainage, fever - patient states understanding   Patient has no questions/concerns at this time - patient has no ongoing care coordination needs at this time will close care coordination and if needs occur in the future please refer again to care coordination    Current Behavioral Concerns: no concerns noted on outreach   No flowsheet data found. No phq9 on file       Education Provided to patient: monitor for sings of infection after laceration, fever, drainage, increase in swelling pain - if this occurs f/u in clinic to assess   Nurse only visit to remove sutures     Clinical Pathway Name: None    Medication Management:  Recently started on cymbalta - not yet working on pain     Functional Status:  Mobility Status: Independent  Equipment Currently Used at Home: none  Transportation: Drives   Works as a HUC at [employer]     "   Psychosocial:  Current living arrangement: I live in a private home with spouse  Financial/Insurance: no concerns      Resources and Interventions:  Current Resources:  (NA);  (NA)  PAS Number:  (NA)  Senior Linkage Line Referral Placed:  (NA)  Advanced Care Plans/Directives on file:: No  Referrals Placed:  (NA)     Goals:   Goal 1 Statement: MAKE APPT. WITH NEUROLOGY   Goal 1 Progression Percent: 100%  Goal 1 Progression Date: 11/08/17    Patient/Caregiver understanding: yes   Frequency of Care Coordination: CLOSED   Upcoming appointment:  (NONE WITH PCP - PATIENT WANTS TO CALL BACK TO SCHEDULE )     Plan:   Patient will monitor laceration for signs if infection and f/u in clinic if this occurs   Patient will continue to work with neurology for management of migraines   CCRN closing care coordination at this time - no ongoing care coordination needs - please refer in the future if needs arise     Mary Lott Care Coordinator RN  [facility]  [phone]  November 8, 2017

## 2017-11-08 NOTE — ED NOTES
Pt arrives to the ED for a laceration to right pointer finger that happened while making chili dogs. Bleeding controlled. No numbness/tingling present in finger. ABCD's intact.

## 2017-11-08 NOTE — ED PROVIDER NOTES
"  History     Chief Complaint:  Laceration    HPI   Mary Lerma is a 31 year old female who presents with a laceration on her right pointer finger. The patient reports that 20 minutes ago she was at home trying to make chili dogs when she sliced her finger on the can of chili. It was her 2nd right finger over her join.  She reports that there was a lot of blood and that it \"splurted\" out of her finger when she bent it originally, but it is not bleeding now. Denies numbness or weakness in hand.  Her last Tdap booster was in May of 2017.    Allergies:  Strattera [Atomoxetine]      Medications:    Cymbalta  Imitrex  Depo provera     Past Medical History:    The patient does not have any past pertinent medical history.     Past Surgical History:    Cholecystectomy  GI surgery  Myringotomy  Orthopedic surgery  Tonsillectomy    Family History:    Diabetes - father  Hypertension - father     Social History:  Smoking status: Former  Alcohol use: No   PCP: Elena Mcdonough   Patient presents with .   Marital Status:        Review of Systems   Skin: Positive for wound.   Neurological: Negative for numbness.   All other systems reviewed and are negative.      Physical Exam     Patient Vitals for the past 24 hrs:   BP Temp Temp src Pulse Heart Rate Resp SpO2 Height Weight   11/07/17 2245 - 98.2  F (36.8  C) Oral - - - - - -   11/07/17 2241 141/78 - - 84 84 18 99 % 1.575 m (5' 2\") 77.1 kg (170 lb)        Physical Exam  Constitutional: Alert, attentive, GCS 15, well appearing young woman  HENT: normocephalic, atraumatic    Eyes: Normal conjunctiva. Pupils are equal, round, and reactive to light.   CV: regular rate and rhythm  Chest: Effort normal and breath sounds normal.   GI:  There is no tenderness  MSK: Normal range of motion of right 2nd finger with flexion and extension at DIP and PIP   Neurological: Alert, attentive, sensation intact over right hand and fingers  Skin: 1.2 cm laceration over right 2nd " finger dorsal surface of PIP, no bleeding, no foreign body visualized      Emergency Department Course     Procedures:    PROCEDURE: Laceration Repair    LACERATION: A simple clean 1.2 cm laceration.    LOCATION: Right second finger over PIP joint    FUNCTION: Distally sensation/circulation/motor function/tendon function are intact.    ANESTHESIA: 2nd right finger digital block using 1% Lidocaine without Epi 3 cc's    PREPARATION: Irrigation with Normal Saline/Shur Clens    DEBRIDEMENT: No debridement    CLOSURE: Wound was closed in 1 layer(s) using 5.0 prolene. 4 simple interrupted sutures.      Wound care instructions were given and the patient was informed to watch for signs of infection, including any redness swelling, warmth or drainage from the wound.    Emergency Department Course:  Past medical records, nursing notes, and vitals reviewed.  2236: I performed an exam of the patient and obtained history, as documented above. GCS 15.   2248: I performed a laceration repair, as noted above  2311: I rechecked the patient.  Findings and plan explained to the Patient. Patient discharged home with instructions regarding supportive care, medications, and reasons to return. The importance of close follow-up was reviewed.      Impression & Plan      Medical Decision Making:  Mary Lerma is a 31 year old female presented to the Emergency Department with a laceration over right 2nd finger. After adequate anesthesia was obtained, the wound was thoroughly irrigated and examined.  There is no evidence of muscular, tendon, or bony involvement at this time, nor signs or symptoms of neurovascular compromise.  Additionally, given the mechanism of injury and examination, suspicion for a foreign body was low and no imaging was done.   Wound was repaired as outlined above in the procedure note.  Her tetanus is up to date.  We discussed appropriate wound care instructions including keeping the wound dry for the first 24-48  hours. Warning signs of infection (erythema, warmth, worsening pain, drainage of pus) were discussed, which should prompt return to the ER for re-evaluation and the patient verbalized understanding.  Will plan for suture removal in 7 days.  Patient was encouraged to return to the ER or follow-up with PCP in the meantime should any new or troubling symptoms develop.    Diagnosis:    ICD-10-CM    1. Laceration of right index finger without foreign body without damage to nail, initial encounter S61.210A        Disposition:  discharged to home    Halima Davis  11/7/2017   [facility] EMERGENCY DEPARTMENT  Halima HERNANDEZ, am serving as a scribe at 10:36 PM on 11/7/2017 to document services personally performed by Jazmyn Bahena MD based on my observations and the provider's statements to me.         Jazmyn Bahena MD  11/07/17 8705       Jazmyn Bahena MD  11/07/17 0173

## 2017-11-08 NOTE — ED NOTES
Pt provided with discharge paperwork and educated on recommended follow-up to have sutures removed. Pt educated on sign/symptoms of infection. Pt voiced understanding and denied any questions at discharge.

## 2017-11-10 ENCOUNTER — ALLIED HEALTH/NURSE VISIT (OUTPATIENT)
Dept: NURSING | Facility: CLINIC | Age: 32
End: 2017-11-10
Payer: COMMERCIAL

## 2017-11-10 DIAGNOSIS — Z30.42 ENCOUNTER FOR SURVEILLANCE OF INJECTABLE CONTRACEPTIVE: Primary | ICD-10-CM

## 2017-11-10 DIAGNOSIS — Z30.9 CONTRACEPTIVE MANAGEMENT: ICD-10-CM

## 2017-11-10 PROCEDURE — 96372 THER/PROPH/DIAG INJ SC/IM: CPT

## 2017-11-17 ENCOUNTER — OFFICE VISIT (OUTPATIENT)
Dept: FAMILY MEDICINE | Facility: CLINIC | Age: 32
End: 2017-11-17
Payer: COMMERCIAL

## 2017-11-17 VITALS
TEMPERATURE: 99.1 F | WEIGHT: 176 LBS | HEIGHT: 62 IN | DIASTOLIC BLOOD PRESSURE: 58 MMHG | SYSTOLIC BLOOD PRESSURE: 98 MMHG | HEART RATE: 98 BPM | BODY MASS INDEX: 32.39 KG/M2

## 2017-11-17 DIAGNOSIS — S61.210D LACERATION OF RIGHT INDEX FINGER WITHOUT FOREIGN BODY WITHOUT DAMAGE TO NAIL, SUBSEQUENT ENCOUNTER: Primary | ICD-10-CM

## 2017-11-17 PROCEDURE — 99213 OFFICE O/P EST LOW 20 MIN: CPT | Performed by: FAMILY MEDICINE

## 2017-11-17 RX ORDER — CEPHALEXIN 500 MG/1
500 CAPSULE ORAL 2 TIMES DAILY
Qty: 20 CAPSULE | Refills: 0 | Status: SHIPPED | OUTPATIENT
Start: 2017-11-17 | End: 2017-11-27

## 2017-11-17 NOTE — MR AVS SNAPSHOT
"              After Visit Summary   11/17/2017    Mary Lerma    MRN: [id number]           Patient Information     Date Of Birth          1985        Visit Information        Provider Department      11/17/2017 2:15 PM Berna Quiles MD [facility]        Today's Diagnoses     Laceration of right index finger without foreign body without damage to nail, subsequent encounter    -  1       Follow-ups after your visit        Who to contact     If you have questions or need follow up information about today's clinic visit or your schedule please contact [facility] directly at [phone].  Normal or non-critical lab and imaging results will be communicated to you by MyChart, letter or phone within 4 business days after the clinic has received the results. If you do not hear from us within 7 days, please contact the clinic through DripDrophart or phone. If you have a critical or abnormal lab result, we will notify you by phone as soon as possible.  Submit refill requests through Occasion or call your pharmacy and they will forward the refill request to us. Please allow 3 business days for your refill to be completed.          Additional Information About Your Visit        MyChart Information     Occasion gives you secure access to your electronic health record. If you see a primary care provider, you can also send messages to your care team and make appointments. If you have questions, please call your primary care clinic.  If you do not have a primary care provider, please call [phone] and they will assist you.        Care EveryWhere ID     This is your Care EveryWhere ID. This could be used by other organizations to access your [facility] medical records  [id number]        Your Vitals Were     Pulse Temperature Height Breastfeeding? BMI (Body Mass Index)       98 99.1  F (37.3  C) (Oral) 5' 2\" (1.575 m) No 32.19 kg/m2        Blood Pressure from Last 3 Encounters: "   11/17/17 98/58   11/07/17 141/78   10/24/17 110/64    Weight from Last 3 Encounters:   11/17/17 176 lb (79.8 kg)   11/07/17 170 lb (77.1 kg)   10/24/17 175 lb (79.4 kg)              Today, you had the following     No orders found for display         Today's Medication Changes          These changes are accurate as of: 11/17/17  2:43 PM.  If you have any questions, ask your nurse or doctor.               Start taking these medicines.        Dose/Directions    cephALEXin 500 MG capsule   Commonly known as:  KEFLEX   Used for:  Laceration of right index finger without foreign body without damage to nail, subsequent encounter   Started by:  Berna Quiles MD        Dose:  500 mg   Take 1 capsule (500 mg) by mouth 2 times daily   Quantity:  20 capsule   Refills:  0            Where to get your medicines      These medications were sent to [facility] [address] [place] [address] [id number] [place] [address] AT [address]  [id number] [place] [address]     Phone:  [phone]     cephALEXin 500 MG capsule                Primary Care Provider Office Phone # Fax #    Elena KARL Mcdonough [phone]       [facility] [address] [address] [facility] [place] [address]        Equal Access to Services     ANDERSON DARNELL AH: Hadii aad ku hadasho Soomaali, waaxda luqadaha, qaybta kaalmada adeegyada, waxay idiin hayakiko yanez. So [facility] [phone].    ATENCIÓN: Si habla español, tiene a myers disposición servicios gratuitos de asistencia lingüística. Llame al [phone].    We comply with applicable federal civil rights laws and [place] laws. We do not discriminate on the basis of race, color, national origin, age, disability, sex, sexual orientation, or gender identity.            Thank you!     Thank you for choosing [facility]  for your care. Our goal is always to provide you with excellent care. Hearing back from our patients is one way we can continue to  improve our services. Please take a few minutes to complete the written survey that you may receive in the mail after your visit with us. Thank you!             Your Updated Medication List - Protect others around you: Learn how to safely use, store and throw away your medicines at www.disposemymeds.org.          This list is accurate as of: 11/17/17  2:43 PM.  Always use your most recent med list.                   Brand Name Dispense Instructions for use Diagnosis    cephALEXin 500 MG capsule    KEFLEX    20 capsule    Take 1 capsule (500 mg) by mouth 2 times daily    Laceration of right index finger without foreign body without damage to nail, subsequent encounter       DULoxetine 30 MG EC capsule    CYMBALTA    30 capsule    Take 1 capsule (30 mg) by mouth daily    Fibromyalgia       medroxyPROGESTERone 150 MG/ML injection    DEPO-PROVERA    1 mL    Inject 1 mL (150 mg) into the muscle every 3 months    Encounter for initial prescription of injectable contraceptive       SUMAtriptan 100 MG tablet    IMITREX    18 tablet    Take 1 tablet (100 mg) by mouth at onset of headache for migraine May repeat in 2 hours. Max 2 tablets/24 hours.    Other migraine without status migrainosus, not intractable

## 2017-11-17 NOTE — PROGRESS NOTES
SUBJECTIVE:   Mary Lerma is a 31 year old female who presents to clinic today for the following health issues:    ED/UC Followup:    Facility:  [facility]  Date of visit: 11-7-17  Reason for visit: finger laceration  Current Status: possible infection       Cut finger on lid of can, 4 sutures from the ED. UTD with tetanus vaccine.    Since she removed sutures, has had more redness and swelling at incision site. No increased pain. Unable to fully bend the finger. She says she asked a few docs at work, and they said it looked infected.         Problem list and histories reviewed & adjusted, as indicated.  Additional history: none    Patient Active Problem List   Diagnosis     Overweight     Acute thoracic back pain, unspecified back pain laterality     Encounter for surveillance of injectable contraceptive     Past Surgical History:   Procedure Laterality Date     CHOLECYSTECTOMY       GI SURGERY  2015    gastric sleeve     MYRINGOTOMY, INSERT TUBE BILATERAL, COMBINED Bilateral 7/10/2017    Procedure: COMBINED MYRINGOTOMY, INSERT TUBE BILATERAL;  Bilateral Pressure Equalization Tubes placement and Bilateral Tonsillectomy;  Surgeon: Jacques Lawson MD;  Location:  OR     ORTHOPEDIC SURGERY       TONSILLECTOMY Bilateral 7/10/2017    Procedure: TONSILLECTOMY;;  Surgeon: Jacques Lawson MD;  Location:  OR       Social History   Substance Use Topics     Smoking status: Former Smoker     Types: Cigarettes     Quit date: 2/9/2017     Smokeless tobacco: Never Used     Alcohol use No     Family History   Problem Relation Age of Onset     Family History Negative Mother      DIABETES Father      Hypertension Father              Reviewed and updated as needed this visit by clinical staff       Reviewed and updated as needed this visit by Provider         ROS:  Constitutional, HEENT, cardiovascular, pulmonary, gi and gu systems are negative, except as otherwise noted.      OBJECTIVE:   BP 98/58 (BP Location: Right arm,  "Patient Position: Chair, Cuff Size: Adult Large)  Pulse 98  Temp 99.1  F (37.3  C) (Oral)  Ht 5' 2\" (1.575 m)  Wt 176 lb (79.8 kg)  Breastfeeding? No  BMI 32.19 kg/m2  Body mass index is 32.19 kg/(m^2).  GENERAL: healthy, alert and no distress  SKIN: well healing incision on the dorsal 1st PIP-IPJ, mild erythema and swelling, no streaking up the arm    Diagnostic Test Results:  none     ASSESSMENT/PLAN:     1. Laceration of right index finger without foreign body without damage to nail, subsequent encounter - discussed does not look infected at this point, monitor over the weekend, if redness worsening or new pain/drainage, start abx. Discussed that extensor tendon in tact because she is able to straighten the finger fully.   - cephALEXin (KEFLEX) 500 MG capsule; Take 1 capsule (500 mg) by mouth 2 times daily  Dispense: 20 capsule; Refill: 0      Berna Quiles MD  [facility]  "

## 2017-11-17 NOTE — NURSING NOTE
"Chief Complaint   Patient presents with     ER F/U       Initial BP 98/58 (BP Location: Right arm, Patient Position: Chair, Cuff Size: Adult Large)  Pulse 98  Temp 99.1  F (37.3  C) (Oral)  Ht 5' 2\" (1.575 m)  Wt 176 lb (79.8 kg)  Breastfeeding? No  BMI 32.19 kg/m2 Estimated body mass index is 32.19 kg/(m^2) as calculated from the following:    Height as of this encounter: 5' 2\" (1.575 m).    Weight as of this encounter: 176 lb (79.8 kg).  Medication Reconciliation: complete     Daquan Rubio CMA          "

## 2017-11-27 ENCOUNTER — OFFICE VISIT (OUTPATIENT)
Dept: FAMILY MEDICINE | Facility: CLINIC | Age: 32
End: 2017-11-27
Payer: COMMERCIAL

## 2017-11-27 VITALS
RESPIRATION RATE: 16 BRPM | HEIGHT: 62 IN | WEIGHT: 179 LBS | DIASTOLIC BLOOD PRESSURE: 66 MMHG | OXYGEN SATURATION: 98 % | HEART RATE: 84 BPM | TEMPERATURE: 98 F | SYSTOLIC BLOOD PRESSURE: 122 MMHG | BODY MASS INDEX: 32.94 KG/M2

## 2017-11-27 DIAGNOSIS — M79.7 FIBROMYALGIA: ICD-10-CM

## 2017-11-27 DIAGNOSIS — G89.29 CHRONIC MIDLINE LOW BACK PAIN WITHOUT SCIATICA: Primary | ICD-10-CM

## 2017-11-27 DIAGNOSIS — M54.50 CHRONIC MIDLINE LOW BACK PAIN WITHOUT SCIATICA: Primary | ICD-10-CM

## 2017-11-27 PROCEDURE — 99214 OFFICE O/P EST MOD 30 MIN: CPT | Performed by: NURSE PRACTITIONER

## 2017-11-27 RX ORDER — HYDROCODONE BITARTRATE AND ACETAMINOPHEN 5; 325 MG/1; MG/1
1-2 TABLET ORAL EVERY 8 HOURS PRN
Qty: 14 TABLET | Refills: 0 | Status: SHIPPED | OUTPATIENT
Start: 2017-11-27 | End: 2017-12-07

## 2017-11-27 RX ORDER — DULOXETIN HYDROCHLORIDE 30 MG/1
30 CAPSULE, DELAYED RELEASE ORAL 2 TIMES DAILY
Qty: 60 CAPSULE | Refills: 3 | Status: SHIPPED | OUTPATIENT
Start: 2017-11-27 | End: 2018-03-09

## 2017-11-27 NOTE — PROGRESS NOTES
SUBJECTIVE:   Mary Lerma is a 32 year old female who presents to clinic today for the following health issues:      Back Pain       Duration: chronic        Specific cause: lifting    Description:   Location of pain: low back bilateral  Character of pain: sharp  Pain radiation:none and down her legs  New numbness or weakness in legs, not attributed to pain:  no     Intensity: Currently 6/10    History:   Pain interferes with job:   History of back problems: chronic  Any previous MRI or X-rays: no  Sees a specialist for back pain:  No  Therapies tried without relief: heat    Alleviating factors:   Improved by: heat      Precipitating factors:  Worsened by: Lifting, Bending, Standing, Sitting, Lying Flat and Walking   Here with complaints of low back pain that is ongoing. Has had an MRI of her lumbar spine and has been seen by orthopedics recently. Diagnosed with fibromyalgia and has started on Cymbalta and is finding it helpful but is wondering about increasing her dosing. 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 Has not been exercising due to pain and stiffness. Has been doing some exercises at home as sister is a physical therapist and has made some recommendations. Mood and joint pain has improved since starting Cymbalta.         Problem list and histories reviewed & adjusted, as indicated.  Additional history: none    Patient Active Problem List   Diagnosis     Overweight     Acute thoracic back pain, unspecified back pain laterality     Encounter for surveillance of injectable contraceptive     Past Surgical History:   Procedure Laterality Date     CHOLECYSTECTOMY       GI SURGERY  2015    gastric sleeve     MYRINGOTOMY, INSERT TUBE BILATERAL, COMBINED Bilateral 7/10/2017    Procedure: COMBINED MYRINGOTOMY, INSERT TUBE BILATERAL;  Bilateral Pressure Equalization Tubes placement and Bilateral Tonsillectomy;  Surgeon: Jacques Lawson MD;  Location:  OR     ORTHOPEDIC  "SURGERY       TONSILLECTOMY Bilateral 7/10/2017    Procedure: TONSILLECTOMY;;  Surgeon: Jacques Lawson MD;  Location:  OR       Social History   Substance Use Topics     Smoking status: Former Smoker     Types: Cigarettes     Quit date: 2/9/2017     Smokeless tobacco: Never Used     Alcohol use No     Family History   Problem Relation Age of Onset     Family History Negative Mother      DIABETES Father      Hypertension Father              Reviewed and updated as needed this visit by clinical staff  Tobacco  Allergies  Meds  Problems  Med Hx  Surg Hx  Fam Hx  Soc Hx        Reviewed and updated as needed this visit by Provider  Allergies  Meds  Problems  Med Hx  Surg Hx  Fam Hx         ROS:  Constitutional, HEENT, cardiovascular, pulmonary, gi and gu systems are negative, except as otherwise noted.      OBJECTIVE:   /66 (BP Location: Right arm, Patient Position: Chair, Cuff Size: Adult Regular)  Pulse 84  Temp 98  F (36.7  C) (Oral)  Resp 16  Ht 5' 2\" (1.575 m)  Wt 179 lb (81.2 kg)  SpO2 98%  BMI 32.74 kg/m2  Body mass index is 32.74 kg/(m^2).  GENERAL: healthy, alert and no distress. overweight  RESP: lungs clear to auscultation - no rales, rhonchi or wheezes  CV: regular rate and rhythm, normal S1 S2, no S3 or S4, no murmur, click or rub, no peripheral edema and peripheral pulses strong  MS: joint pain.   SKIN: no suspicious lesions or rashes  NEURO: Normal strength and tone, mentation intact and speech normal  PSYCH: mentation appears normal, affect normal/bright        ASSESSMENT/PLAN:             1. Fibromyalgia  Cymbalta increased to 30 mg twice daily.   - DULoxetine (CYMBALTA) 30 MG EC capsule; Take 1 capsule (30 mg) by mouth 2 times daily  Dispense: 60 capsule; Refill: 3    2. Chronic midline low back pain without sciatica  Norco for severe pain only and absolutely no refills. Referral to PT for evaluation. No need for further imaging.   - HYDROcodone-acetaminophen (NORCO) 5-325 MG " per tablet; Take 1-2 tablets by mouth every 8 hours as needed for moderate to severe pain  Dispense: 14 tablet; Refill: 0  - [facility], [facility] CH[facility]RACT[facility] REFERRAL    Advised to call with an update on effectiveness of cymbalta increase. Needs to be more active with gently stretching. Has joined a health club and plan is to start water exercises.     Elena Mcdonough, NP  [facility]

## 2017-11-27 NOTE — MR AVS SNAPSHOT
After Visit Summary   11/27/2017    Mary Lerma    MRN: [id number]           Patient Information     Date Of Birth          1985        Visit Information        Provider Department      11/27/2017 8:45 AM Elena Mcdonough NP [facility]        Today's Diagnoses     Chronic midline low back pain without sciatica    -  1    Fibromyalgia           Follow-ups after your visit        Additional Services     [facility], AND CH[facility]CT[facility] REFERRAL       **This order will print in the [facility] Scheduling Office**    Physical Therapy, Hand Therapy and Chiropractic Care are available through:    *[facility]  *[facility]  *[facility]    Call one number to schedule at any of the above locations: [phone].    Your provider has referred you to: Physical Therapy at [facility] or [facility]    Indication/Reason for Referral: Low Back Pain  Onset of Illness:   Therapy Orders: Evaluate and Treat  Special Programs:   Special Request:     Dinorah Copeland      Additional Comments for the Therapist or Chiropractor:     Please be aware that coverage of these services is subject to the terms and limitations of your health insurance plan.  Call member services at your health plan with any benefit or coverage questions.      Please bring the following to your appointment:    *Your personal calendar for scheduling future appointments  *Comfortable clothing                  Who to contact     If you have questions or need follow up information about today's clinic visit or your schedule please contact [facility] directly at [phone].  Normal or non-critical lab and imaging results will be communicated to you by MyChart, letter or phone within 4 business days after the clinic has received the results. If you do not hear from us within 7 days, please contact the clinic through MyChart or phone. If you have a critical or abnormal lab result, we  "will notify you by phone as soon as possible.  Submit refill requests through Vixlo or call your pharmacy and they will forward the refill request to us. Please allow 3 business days for your refill to be completed.          Additional Information About Your Visit        ISIShart Information     Vixlo gives you secure access to your electronic health record. If you see a primary care provider, you can also send messages to your care team and make appointments. If you have questions, please call your primary care clinic.  If you do not have a primary care provider, please call [phone] and they will assist you.        Care EveryWhere ID     This is your Care EveryWhere ID. This could be used by other organizations to access your [facility] medical records  [id number]        Your Vitals Were     Pulse Temperature Respirations Height Pulse Oximetry BMI (Body Mass Index)    84 98  F (36.7  C) (Oral) 16 5' 2\" (1.575 m) 98% 32.74 kg/m2       Blood Pressure from Last 3 Encounters:   11/27/17 122/66   11/17/17 98/58   11/07/17 141/78    Weight from Last 3 Encounters:   11/27/17 179 lb (81.2 kg)   11/17/17 176 lb (79.8 kg)   11/07/17 170 lb (77.1 kg)              We Performed the Following     [facility], [facility] CH[facility]CT[facility] REFERRAL          Today's Medication Changes          These changes are accurate as of: 11/27/17  8:59 AM.  If you have any questions, ask your nurse or doctor.               Start taking these medicines.        Dose/Directions    HYDROcodone-acetaminophen 5-325 MG per tablet   Commonly known as:  NORCO   Used for:  Chronic midline low back pain without sciatica   Started by:  Elena Mcdonough NP        Dose:  1-2 tablet   Take 1-2 tablets by mouth every 8 hours as needed for moderate to severe pain   Quantity:  14 tablet   Refills:  0         These medicines have changed or have updated prescriptions.        Dose/Directions    DULoxetine 30 MG EC capsule   Commonly known as:  CYMBALTA   This may " have changed:  when to take this   Used for:  Fibromyalgia   Changed by:  Elena Mcdonough NP        Dose:  30 mg   Take 1 capsule (30 mg) by mouth 2 times daily   Quantity:  60 capsule   Refills:  3            Where to get your medicines      These medications were sent to [facility] [address] [facility] [address] [facility] AT [address]  [address] [facility] [address]     Phone:  [phone]     DULoxetine 30 MG EC capsule         Some of these will need a paper prescription and others can be bought over the counter.  Ask your nurse if you have questions.     Bring a paper prescription for each of these medications     HYDROcodone-acetaminophen 5-325 MG per tablet                Primary Care Provider Office Phone # Fax #    Elena Mcdonough [phone]       [facility] [address] [address] [facility] [address]        Equal Access to Services     ANDERSON DARNELL : Hadii aad ku hadasho Soomaali, waaxda luqadaha, qaybta kaalmada adeegyada, waxay jacobin haymaria esthern yue steward . So [facility] [phone].    ATENCIÓN: Si habla español, tiene a myers disposición servicios gratuitos de asistencia lingüística. [facility] [phone].    We comply with applicable federal civil rights laws and [place] laws. We do not discriminate on the basis of race, color, national origin, age, disability, sex, sexual orientation, or gender identity.            Thank you!     Thank you for choosing [facility]  for your care. Our goal is always to provide you with excellent care. Hearing back from our patients is one way we can continue to improve our services. Please take a few minutes to complete the written survey that you may receive in the mail after your visit with us. Thank you!             Your Updated Medication List - Protect others around you: Learn how to safely use, store and throw away your medicines at www.disposemymeds.org.          This list is accurate  as of: 11/27/17  8:59 AM.  Always use your most recent med list.                   Brand Name Dispense Instructions for use Diagnosis    DULoxetine 30 MG EC capsule    CYMBALTA    60 capsule    Take 1 capsule (30 mg) by mouth 2 times daily    Fibromyalgia       HYDROcodone-acetaminophen 5-325 MG per tablet    NORCO    14 tablet    Take 1-2 tablets by mouth every 8 hours as needed for moderate to severe pain    Chronic midline low back pain without sciatica       medroxyPROGESTERone 150 MG/ML injection    DEPO-PROVERA    1 mL    Inject 1 mL (150 mg) into the muscle every 3 months    Encounter for initial prescription of injectable contraceptive       SUMAtriptan 100 MG tablet    IMITREX    18 tablet    Take 1 tablet (100 mg) by mouth at onset of headache for migraine May repeat in 2 hours. Max 2 tablets/24 hours.    Other migraine without status migrainosus, not intractable

## 2017-11-27 NOTE — NURSING NOTE
"Chief Complaint   Patient presents with     Back Pain       Initial /66 (BP Location: Right arm, Patient Position: Chair, Cuff Size: Adult Regular)  Pulse 84  Temp 98  F (36.7  C) (Oral)  Resp 16  Ht 5' 2\" (1.575 m)  Wt 179 lb (81.2 kg)  SpO2 98%  BMI 32.74 kg/m2 Estimated body mass index is 32.74 kg/(m^2) as calculated from the following:    Height as of this encounter: 5' 2\" (1.575 m).    Weight as of this encounter: 179 lb (81.2 kg).  Medication Reconciliation: complete     Wilber Hyatt CMA      "

## 2017-12-05 PROBLEM — G43.719 INTRACTABLE CHRONIC MIGRAINE WITHOUT AURA: Status: ACTIVE | Noted: 2017-12-05

## 2017-12-07 ENCOUNTER — MYC MEDICAL ADVICE (OUTPATIENT)
Dept: FAMILY MEDICINE | Facility: CLINIC | Age: 32
End: 2017-12-07

## 2017-12-07 DIAGNOSIS — G89.29 CHRONIC MIDLINE LOW BACK PAIN WITHOUT SCIATICA: ICD-10-CM

## 2017-12-07 DIAGNOSIS — M54.50 CHRONIC MIDLINE LOW BACK PAIN WITHOUT SCIATICA: ICD-10-CM

## 2017-12-07 NOTE — TELEPHONE ENCOUNTER
Per PLAXD message, patient has scheduled PT eval requesting refill as well.      Norco      Last Written Prescription Date:  11/27/2017  Last Fill Quantity: 14,   # refills: 0  Last Office Visit: 11/27/2017  Future Office visit:       Routing refill request to provider for review/approval because:  Drug not on the [facility], UMP or  Health refill protocol or controlled substance      [place] reviewed:  reviewed- recommend provider review    [place]:  [email or web address]    Several MD's within the last 6 months    Last Filled:    11/27/2017, #14  9/11/2017, #15 Neha Dennis MD  9/6/2017, #20 SHANNAN Quiles      Oxycodone Solution  7/28/2017, 200ml Jacques,SHANNAN  7/12/2017, 600ml Roldan, S    Lorenza GONZALEZ RN, BSN, PHN  [facility]

## 2017-12-08 ENCOUNTER — MYC MEDICAL ADVICE (OUTPATIENT)
Dept: FAMILY MEDICINE | Facility: CLINIC | Age: 32
End: 2017-12-08

## 2017-12-08 RX ORDER — HYDROCODONE BITARTRATE AND ACETAMINOPHEN 5; 325 MG/1; MG/1
1-2 TABLET ORAL EVERY 8 HOURS PRN
Qty: 14 TABLET | Refills: 0 | Status: SHIPPED | OUTPATIENT
Start: 2017-12-08 | End: 2017-12-26

## 2017-12-12 ENCOUNTER — THERAPY VISIT (OUTPATIENT)
Dept: PHYSICAL THERAPY | Facility: CLINIC | Age: 32
End: 2017-12-12
Payer: COMMERCIAL

## 2017-12-12 DIAGNOSIS — M54.6 THORACIC BACK PAIN: ICD-10-CM

## 2017-12-12 DIAGNOSIS — M54.50 LUMBAGO: Primary | ICD-10-CM

## 2017-12-12 PROCEDURE — 97161 PT EVAL LOW COMPLEX 20 MIN: CPT | Mod: GP | Performed by: PHYSICAL THERAPIST

## 2017-12-12 PROCEDURE — 97112 NEUROMUSCULAR REEDUCATION: CPT | Mod: GP | Performed by: PHYSICAL THERAPIST

## 2017-12-12 PROCEDURE — 97140 MANUAL THERAPY 1/> REGIONS: CPT | Mod: GP | Performed by: PHYSICAL THERAPIST

## 2017-12-12 NOTE — PROGRESS NOTES
Subjective:    Patient is a 32 year old female presenting with rehab left ankle/foot hpi.                                      Pertinent medical history includes:  Overweight, fibromyalgia and migraines.    Other surgeries include:  Orthopedic surgery and other (Rt knee 2016/2009- Acl/Mcl/Meniscus, gastric sleeve 2015).  Current medications:  Sleep medication and pain medication (melatonin, cymbalta).  Current occupation is Health unit coordinator.    Primary job tasks include:  Prolonged sitting (computer work).        Red flags:  Pain at rest/night.                        Objective:    System    Physical Exam    General     ROS    Assessment/Plan:

## 2017-12-12 NOTE — PROGRESS NOTES
[facility] Initial Evaluation    Subjective:    Patient is a 32 year old female presenting with rehab back hpi. The history is provided by the patient.   Mary Lerma is a 32 year old female with a lumbar and thoracic condition.      This is a chronic condition  Patient has chief complaint of thoracic and low back pain, R>L, which started about a year ago after a fall. She has had chronic lumbar pain for over 10 years. History includes: 2 right knee surgeries after sledding accident in 2009 (ACL/MCL repair and meniscus);gastric sleeve surgery 2015; fibromyalgia.She has 2 children, ages 4 & 8. She has frequent migraine headaches and neurologist thought it was from cervical muscle tightness. She walks 30-60 minutes/day and works as a [employer] at [employer].    Patient reports pain:  Thoracic spine right, central lumbar spine and lumbar spine left.  Radiates to:  Gluteals right and thigh right.   and is constant and reported as 7/10 and 10/10.  Associated symptoms:  Loss of motion/stiffness and loss of strength. Pain is worse in the P.M. and worse in the A.M..  Symptoms are exacerbated by lying down, lifting, carrying, certain positions, stress, sitting and bending and relieved by analgesics (does not tolerate massage or chiropractic care).  Since onset symptoms are gradually worsening.  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      General health as reported by patient is good.                                              Objective:    Standing Alignment:        Lumbar:  Posterior pelvic tilt (slouched sitting posture)                           Lumbar/SI Evaluation  ROM:    AROM Lumbar:   Flexion:            30% pain  Ext:                    50% pain   Side Bend:        Left:  60%    Right:  60%  Rotation:           Left:  70%    Right:  70%  Side Glide:        Left:     Right:       AROM Thoracic:  Flex:               60%  Ext:                 25%  Rotation:        Left:  30% pain    Right:  50% pain       Lumbar Myotomes:  not assessed              Cord Signs:  not assessed        Lumbar Palpation:    Tenderness present at Left:    Piriformis; PSIS and Vertebral  Tenderness present at Right: Quadratus Lumborum; Erector Spinae; Piriformis; PSIS; Greater Trochanter and Vertebral    Lumbar Provocation:  Lumbar provocation: pain in right lumbar.  Left positive with:  PROM hip  Right positive with: PROM hip                                                 General     ROS    Assessment/Plan:      Patient is a 32 year old female with thoracic and lumbar complaints.    Patient has the following significant findings with corresponding treatment plan.                Diagnosis 1:  Back pain  Pain -  hot/cold therapy, education and home program  Decreased ROM/flexibility - manual therapy, therapeutic exercise and home program  Decreased strength - therapeutic exercise, therapeutic activities and home program  Impaired posture - neuro re-education and home program    Therapy Evaluation Codes:   1) History comprised of:   Personal factors that impact the plan of care:      None.    Comorbidity factors that impact the plan of care are:      None.     Medications impacting care: None.  2) Examination of Body Systems comprised of:   Body structures and functions that impact the plan of care:      Lumbar spine and Thoracic Spine.   Activity limitations that impact the plan of care are:      Bending, Lifting and Sleeping.  3) Clinical presentation characteristics are:   Stable/Uncomplicated.  4) Decision-Making    Low complexity using standardized patient assessment instrument and/or measureable assessment of functional outcome.  Cumulative Therapy Evaluation is: Low complexity.    Previous and current functional limitations:  (See Goal Flow Sheet for this information)    Short term and Long term goals: (See Goal Flow Sheet for this information)     Communication ability:   Patient appears to be able to clearly communicate and understand verbal and written communication and follow directions correctly.  Treatment Explanation - The following has been discussed with the patient:   RX ordered/plan of care  Anticipated outcomes  Possible risks and side effects  This patient would benefit from PT intervention to resume normal activities.   Rehab potential is good.    Frequency:  2 X week, once daily  Duration:  for 2 weeks tapering to 1 X a week over 4 weeks  Discharge Plan:  Achieve all LTG.  Independent in home treatment program.  Reach maximal therapeutic benefit.    Please refer to the daily flowsheet for treatment today, total treatment time and time spent performing 1:1 timed codes.

## 2017-12-21 ENCOUNTER — THERAPY VISIT (OUTPATIENT)
Dept: PHYSICAL THERAPY | Facility: CLINIC | Age: 32
End: 2017-12-21
Payer: COMMERCIAL

## 2017-12-21 DIAGNOSIS — M54.50 LUMBAGO: ICD-10-CM

## 2017-12-21 DIAGNOSIS — M54.6 THORACIC BACK PAIN: ICD-10-CM

## 2017-12-21 PROCEDURE — 97530 THERAPEUTIC ACTIVITIES: CPT | Mod: GP | Performed by: PHYSICAL THERAPIST

## 2017-12-21 PROCEDURE — 97112 NEUROMUSCULAR REEDUCATION: CPT | Mod: GP | Performed by: PHYSICAL THERAPIST

## 2017-12-21 PROCEDURE — 97110 THERAPEUTIC EXERCISES: CPT | Mod: GP | Performed by: PHYSICAL THERAPIST

## 2017-12-21 NOTE — PROGRESS NOTES
[facility] Evaluation  Subjective:    HPI                    Objective:    System    Physical Exam    General     ROS    Assessment/Plan:      PROGRESS  REPORT    Progress reporting period is from 12/12/2017 to 12/21/2017.     SUBJECTIVE    Subjective: Patient reports no change after first session. She is more aware of slouched sitting posture but is unable to maintain upright posture due to pain. Does not tolerate even light manual therapy or massage of any kind and cannot tolerate exercises with theraband. Tried exercising lightly in the pool and tolerated that better.    Current Pain level: 7/10   Initial Pain level: 7/10 (up tp 10/10 at times)     OBJECTIVE    Objective: Discussed trying kinesiotape on her back for postural assistance and given information on whta kind to buy. She will try it on her skin on a small area to make sure she does not react to it. Also showed her different types of supportive posture tanks that she could try if her skin cannot tolerate tape.      ASSESSMENT/PLAN  Updated problem list and treatment plan: Diagnosis 1:  Back pain  Pain -  splint/taping/bracing/orthotics and self management  Decreased ROM/flexibility - manual therapy, therapeutic exercise and home program  Decreased strength - therapeutic exercise, therapeutic activities and home program  Impaired posture - neuro re-education  STG/LTGs have been met or progress has been made towards goals:  None  Assessment of Progress: The patient's condition is unchanged.  Self Management Plans:  Patient has been instructed in a home treatment program.  Patient  has been instructed in self management of symptoms.  I have re-evaluated this patient and find that the nature, scope, duration and intensity of the therapy is appropriate for the medical condition of the patient.  Mary continues to require the following intervention to meet STG and LTG's: PT      Recommendations:  This patient would benefit from continued  therapy.     Frequency:  1x/week for trial of kinesiotaping.  Duration:  for 2 visits    Recommend she goes to pool for exercise, and may progress to exercises on land at a later date.        Please refer to the daily flowsheet for treatment today, total treatment time and time spent performing 1:1 timed codes.

## 2017-12-21 NOTE — Clinical Note
Mary has been seen for 3 sessions and cannot tolerate manual therapy or many exercises, but did well in pool. Will see her 1-2 more sessions for trial of kinesiotaping for back posture, then discontinue PT for now and she will go to pool instead for exercise. She may benefit from more PT at a later date. Progress note completed.  Thanks for your referral!  Tanja Cotton,PT

## 2017-12-26 ENCOUNTER — MYC MEDICAL ADVICE (OUTPATIENT)
Dept: FAMILY MEDICINE | Facility: CLINIC | Age: 32
End: 2017-12-26

## 2017-12-26 DIAGNOSIS — G89.29 CHRONIC MIDLINE LOW BACK PAIN WITHOUT SCIATICA: ICD-10-CM

## 2017-12-26 DIAGNOSIS — M54.50 CHRONIC MIDLINE LOW BACK PAIN WITHOUT SCIATICA: ICD-10-CM

## 2017-12-26 RX ORDER — HYDROCODONE BITARTRATE AND ACETAMINOPHEN 5; 325 MG/1; MG/1
1-2 TABLET ORAL EVERY 8 HOURS PRN
Qty: 14 TABLET | Refills: 0 | Status: SHIPPED | OUTPATIENT
Start: 2017-12-26 | End: 2018-01-10

## 2017-12-26 NOTE — TELEPHONE ENCOUNTER
Controlled Substance Refill Request for Norco  Problem List Complete:  No     PROVIDER TO CONSIDER COMPLETION OF PROBLEM LIST AND OVERVIEW/CONTROLLED SUBSTANCE AGREEMENT    Last Written Prescription Date:  12/8/17  Last Fill Quantity: 14,   # refills: 0    [place] [facility] [place]    Future Office visit:     Controlled substance agreement on file: No.     Processing:  Patient will  in clinic     checked in past 6 months?  Yes 12/26/17     Chronic pain has been added to PL per protocol due to amount of narcotics pt has received.    Gilda Hoyos RN, BSN

## 2017-12-26 NOTE — TELEPHONE ENCOUNTER
I spoke with PT and they have recommended pool therapy. Will fill this one last time. May need referral to pain clinic if pain continues and that is based on [facility] pain policy.

## 2018-01-04 ENCOUNTER — THERAPY VISIT (OUTPATIENT)
Dept: PHYSICAL THERAPY | Facility: CLINIC | Age: 33
End: 2018-01-04
Payer: COMMERCIAL

## 2018-01-04 DIAGNOSIS — M54.50 LUMBAGO: ICD-10-CM

## 2018-01-04 DIAGNOSIS — M54.6 THORACIC BACK PAIN: ICD-10-CM

## 2018-01-04 PROCEDURE — 97112 NEUROMUSCULAR REEDUCATION: CPT | Mod: GP | Performed by: PHYSICAL THERAPIST

## 2018-01-04 PROCEDURE — 97530 THERAPEUTIC ACTIVITIES: CPT | Mod: GP | Performed by: PHYSICAL THERAPIST

## 2018-01-05 ENCOUNTER — MYC MEDICAL ADVICE (OUTPATIENT)
Dept: FAMILY MEDICINE | Facility: CLINIC | Age: 33
End: 2018-01-05

## 2018-01-05 DIAGNOSIS — M54.2 NECK PAIN: Primary | ICD-10-CM

## 2018-01-05 NOTE — TELEPHONE ENCOUNTER
See my chart request and complete pended referral -  Writer did included both [facility] and [facility] pain as one may have sooner appt.     Sonja Suh RN

## 2018-01-09 NOTE — PROGRESS NOTES
"      [facility]     Date of visit: 1/9/2018    Reason for consultation:    Mary Lerma is a 32 year old female who is seen in consultation today at the request of her PCP,  Elena Mcdonough for evaluation of her pain issues and recommendations for management, with specific emphasis on  Please complete the following questions:    What is your diagnosis for the patient's pain? Fibromyalgia and neck pain.     Do you have any specific questions for the pain specialist? No    Are there any red flags that may impact the assessment or management of the patient? None     Please see the [facility] health questionnaire which the patient completed and reviewed with me in detail.    Review of [place] Prescription Monitoring Program (): No concern for abuse or misuse of controlled medications based on this report.     Pain medications are being prescribed by Elena Mcdonough CNP.     Subjective:    Chief Complaint:    Chief Complaint   Patient presents with     Pain     consult       Pain history:  Mary Lerma is a 32 year old female who presents for initial evaluation of chief complaint of back and widespread pain.      She first started having problems with low back pain as a teenager. Insidious onset, without acute precipitating event. She states she didn't try to manage the pain, tried to ignore it for several years. States the mid back pain started last year, denies injury. The back pain has been most problematic for last 6-12 months. She had a MRI, as ordered by her PCP, in June of last year as her pain was worsening. She started water aerobics 3-4 weeks, goes once to twice weekly. States, \"thats the best I feel when I'm doing that.\" She has had physical therapy on a regular basis without relief, recently stopped going due to severity of pain. She has tried kinetic tape with little relief, states it gives her more support. She uses a heating pad on a regular basis with " relief. She has tried Norco and Flexeril intermittently with some relief. She has a hx of weight issues, had a gastric sleeve in 2016. She has gained 30lbs since the surgery.  Also reports intermittent right hip pain.The pain is located in her low back and mid back. Denies radiation. Occasional numbness and tingling in right arm and anterior leg. Generalized weakness due to pain.    She first started having problems with widespread pain last year. She reports ongoing pain in bilateral hips, right knee (hx of x2 surgeries on knee), and neck. She was diagnosed with fibromyalgia by her PCP a few months ago. She was started on Cymbalta for this, states this is helpful for mood, unsure if helpful for widespread pain.       Pain description:  Location: mid back, low back, widespreac  Quality: miserable, shooting, unbearable, exhausting, sharp, stabbing  Severity/Intensity: 10/10 at worst, 3/10 at best, 7/10 on average  Aggravating factors include: too much activity  Relieving factors include: heating pack, water aerobics, PT, heat pack, tape on back, stretches    The patient otherwise denies bowel or bladder incontinence, parasthesias, weakness, saddle anesthesia, unintentional weight loss, or fever/chills/sweats.     Mary Lerma has not been seen at a pain clinic in the past.      Pain Treatments:  1. Medications:       Current pain medications:   Cymbalta 30mg BID- H for mood, unsure if H for joint pain     Imitrex 100mg- H  Current calculated MME: 0    1. Previous Pain Relevant Medications:  (H--helped; HI--Helped initially; [facility]--Somewhat helpful; [facility]--No help; W--worse; SE--side effects; ?--Unsure if helpful)   NOTE: This medication information taken from patient's intake form, not medical records.    Opiates: Norco- [facility], tramadol- [facility], Percocet- ?   NSAIDS: Ibuprofen- [facility]    Muscle Relaxants: Flexeril- ?, SE, sleepy   Anti-migraine mediations: Imitrex- H   Anti-depressants: Cymbalta- H for mood, Zoloft- ?   Sleep  aids: Melatonin- H   Anxiolytics: no   Neuropathics: no    Topicals: no   Other medications not covered above: Tylenol- [facility]    2. Physical Therapy: yes traditional- [facility]  3. Surgery: on right knee x2 (ACL/MCL/meniscus repair), carpal tunnel release right 2016  4. Injections: no  5. Chiropractic: yes- [facility]  6. Acupuncture: no  7. TENS Unit: yes- [facility]    Imaging:  MRI of lumbar spine was completed on 6/1/17 and shows:  FINDINGS: There are four nonrib-bearing lumbar-type vertebra. The S1  segment appears to be possibly transitional and partially lumbarized  on the right. Rudimentary disc is noted at S1-S2. Apophyseal joints  appear essentially within normal limits. Disc signal and disc height  appears within normal limits throughout the lumbar spine. Vertebral  body heights and sagittal alignment are within normal limits. The  conus medullaris is unremarkable in appearance on the sagittal images.  Marrow signal is within normal limits. Transverse images from L1 to  the sacrum reveal no evidence of disc bulge or herniation. There is no  central or foraminal stenosis.         IMPRESSION:  1. 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 The transitional segment, labeled S1  for the purposes of this report, may be partially lumbarized on the  left.  2. No lumbar disc degenerative loss of signal or herniation. No lumbar  stenosis or apparent neural impingement.    Past Medical History:  No past medical history on file.    Past Surgical History:  Past Surgical History:   Procedure Laterality Date     CHOLECYSTECTOMY       GI SURGERY  2015    gastric sleeve     MYRINGOTOMY, INSERT TUBE BILATERAL, COMBINED Bilateral 7/10/2017    Procedure: COMBINED MYRINGOTOMY, INSERT TUBE BILATERAL;  Bilateral Pressure Equalization Tubes placement and Bilateral Tonsillectomy;  Surgeon: Jacques Lawson MD;  Location:  OR      ORTHOPEDIC SURGERY       TONSILLECTOMY Bilateral 7/10/2017    Procedure: TONSILLECTOMY;;  Surgeon: Jacques Lawson MD;  Location:  OR       Medications:  Current Outpatient Prescriptions   Medication Sig Dispense Refill     gabapentin (NEURONTIN) 100 MG capsule Take 3 capsules (300 mg) by mouth 3 times daily 150 capsule 1     DULoxetine (CYMBALTA) 30 MG EC capsule Take 1 capsule (30 mg) by mouth 2 times daily 60 capsule 3     SUMAtriptan (IMITREX) 100 MG tablet Take 1 tablet (100 mg) by mouth at onset of headache for migraine May repeat in 2 hours. Max 2 tablets/24 hours. 18 tablet 1     medroxyPROGESTERone (DEPO-PROVERA) 150 MG/ML injection Inject 1 mL (150 mg) into the muscle every 3 months 1 mL 3       Allergies:     Allergies   Allergen Reactions     Strattera [Atomoxetine] Hives       Social History:  Home situation: lives in a trailer with  and two kids, cat, dog  Support system: , mom  Occupation/Schooling: [employer]  Tobacco use: yes, quit in the last few months   Drug use: no  Alcohol use: rarely  History of chemical dependency treatment: no  Mental health admissions: no    Family history:  Family History   Problem Relation Age of Onset     Family History Negative Mother      DIABETES Father      Hypertension Father      Family history of headaches: yes    Review of Systems:    POSTIVE IN BOLD  GENERAL: fever/chills, fatigue, general unwell feeling, weight gain/loss.  HEAD/EYES:  headache, dizziness, or vision changes.    EARS/NOSE/THROAT: nosebleeds, hearing loss, sinus infection, earache, tinnitus.  IMMUNE:  allergies, cancer, immune deficiency, or infections.  SKIN:  itching, rash, hives  HEME/Lymphatic: anemia, easy bruising, easy bleeding.  RESPIRATORY: cough, wheezing, or shortness of breath  CARDIOVASCULAR/Circulation: extremity edema, syncope, hypertension, tachycardia, or angina.  GASTROINTESTINAL: abdominal pain, nausea/emesis, diarrhea, constipation,  hematochezia,  or melena.  ENDOCRINE:  diabetes, steroid use,  thyroid disease or osteoporosis.  MUSCULOSKELETAL: joint pain, stiffness, neck pain, back pain, arthritis, or gout.  GENITOURINARY: frequency, urgency, dysuria, difficulty voiding, hematuria or incontinence.  NEUROLOGIC: weakness, numbness, paresthesias, seizure, tremor, stroke or memory loss.  PSYCHIATRIC: depression, anxiety, stress, suicidal thoughts or mood swings.     Objective:    Physical Exam:  Vitals:    01/10/18 1051   BP: 122/79   Pulse: 87   SpO2: 96%   Weight: 84.4 kg (186 lb)     Exam:  Constitutional: Well developed, well nourished, appears stated age.  HEENT: Head atraumatic, normocephalic. Eyes without conjunctival injection or jaundice. Oropharynx clear. Neck supple. No obvious neck masses.  Cardiovascular: Regular rate/rhythm; no murmurs/rubs/gallops appreciated.  Respiratory: Lungs clear to auscultation bilaterally. Good aeration. No wheezing/rales/rhonchi.   Skin: No rash, lesions, or petechiae of exposed skin.   Extremities: Peripheral pulses intact. No clubbing, cyanosis, or edema.  Psychiatric/mental status: Alert, without lethargy or stupor. Speech fluent. Appropriate affect. Mood normal. Able to follow commands without difficulty.     Musculoskeletal exam:  Able to walk on the heels and toes with mild difficulty. Patient does not have an antalgic gait .   Normal bulk and tone. Unremarkable spinal curvature.     Cervical spine:  Range of motion within normal limits   Tenderness in the cervical paraspinal muscles.No  Spurling's negative bilaterally.     Thoracic spine:    Kyphosis. No   Tenderness in the thoracic paraspinal muscles.Yes    Lumbar spine:    Flex:  50 degrees   Ext: 10 degrees   Tenderness in the lumbar paraspinal muscles.Yes   Rotation/ext to right: painful    Rotation/ext to left: painful     Myofascial tenderness:  Thoracic and lumbar paraspinals, also bilateral lats  Focal tenderness: No SI joint, gluteal, piriformis, or GT,  tenderness  Straight leg raise: negative   FADIR: negative     Hip exam:   Normal internal and external range of motion bilaterally. VERO negative .     Neurologic exam:  CN:  Cranial nerves 2-12 are grossly intact  Motor:  5/5 UE and LE strength  Strength:       C4 (shoulder shrug)  symmetric 5/5       C5 (shoulder abduction) symmetric 5/5       C6 (elbow flexion) symmetric 5/5       C7 (elbow extension) symmetric 5/5       C8 (finger abduction, thumb flexion) symmetric 5/5    Reflexes:     Biceps:     R:  2/4 L: 2/4   Brachioradialis   R:  2/4 L: 2/4   Patella:  R:  2/4 L: 2/4   Achilles:  R:  2/4 L: 2/4  Other reflexes:    No ankle clonus     Sensory:   Light touch: normal bilateral upper and lower extremities    Vibration: normal in LE   No allodynia, dysesthesia, or hyperalgesia.    Fibromyalgia Assessment:    Myofascial tenderness:  yes   Tender point survey:  16/18 2010 ACR Criteria for fibromyalgia - scoring   Widespread pain index of > or equal to 7 - yes   Symptoms present for at least 3 months - yes   No other disorder to explain their pain - yes   Symptoms Severity scale score > or equal to 5   Fatigue (0-3) - 3   Waking unrefreshed (0-3) - 3   Cognitive symptoms (0-3) - 3   Somatic Symptoms - 1  (None - 0, Few - 1, Moderate - 2, Great deal - 3)    Patient does meet the criteria for a diagnosis of Fibromyalgia.     DIRE Score for ongoing opioid management is calculated as follows:   Diagnosis = 2 pts (slowly progressive; moderate pain/objective findings)   Intractability = 2 pts (most treatments tried; patient not fully engaged/barriers)   Risk    Psych = 3 pts (no significant personality dysfunction/mental illness; good communication with clinic)    Chem Hlth = 2 pts (use of medications to cope with stress; chemical dependency in remission)   Reliability = 3 pts (highly reliable with meds, appointments, treatments)   Social = 3 pts (supportive family/close relationships; involved in work/school; no  isolation)   (Psych + Chem hlth + Reliability + Social) = 15     Efficacy = 2 pts (moderate benefit/function; low med dose; too early/not tried meds)         DIRE Score = 17        7-13: likely NOT suitable candidate for long-term opioid analgesia       14-21: may be a suitable candidate for long-term opioid analgesia     Assessment:  Mary Lerma is a 32 year old female with a past medical history significant for migraines who presents with complaints of back and widespread pain.     1. Back pain- etiology unclear, chronic in nature, likely related to fibromyalgia.   2. Widespread pain- etiology likely fibromyalgia.   3. Mental Health - the patient's mental health concerns, specifically situational depression, affect her experience of pain and contribute to her clinically significant distress.    1. Fibromyalgia    2. Chronic bilateral thoracic back pain    3. Chronic bilateral low back pain with sciatica, sciatica laterality unspecified        Plan:  The following recommendations were given to the patient. Diagnosis, treatment options, risks, benefits, and alternatives were discussed, and all questions were answered. The patient expressed understanding of the plan for management.     I am recommending a multidisciplinary treatment plan to help this patient better manage her pain.  This includes:      1.  Pain Physical Therapy:  YES   Schedule first visit with LOBO Villalta. Strongly encouraged regular physical activity and water aerobics.     2.  Pain Psychologist to address relaxation, behavioral change, coping style, and other factors important to improvement.  YES  Schedule first visit with Dr. Cade    3.  Medication Management:     1. Start gabapentin as directed below. Mary is concerned about side effect of weight gain. She will monitor her weight and contact me if she is gaining weight.    Gabapentin 1 tab= 100mg    AM   PM   Bedtime  0   0   100mg (1 tab).  After 3-5 days, increase as tolerated   100mg  (1 tab)  0   100mg (1 tab).  After 3-5 days, increase as tolerated   100mg (1 tab)  100mg (1 tab)  100mg (1 tab).  After 3-5 days, increase as tolerated   100mg (1 tab)  100mg (1 tab)  200mg (2 tabs). After 3-5 days, increase as tolerated   200mg (2 tabs)  100mg (1 tab)  200mg (2 tabs). After 3-5 days, increase as tolerated   200mg (2 tabs)  200mg (2 tabs)  200mg (2 tabs). After 3-5 days, increase as tolerated   200mg (2 tabs)  200mg (2 tabs)  300mg (3 tabs). After 3-5 days, increase as tolerated   300mg (3 tabs)  200mg (2 tabs)  300mg (3 tabs). After 3-5 days, increase as tolerated   300mg (3 tabs)  300mg (3 tabs)  300mg (3 tabs).     Call with any problems.  Caution for sedation.    Do not drive until you know how the medication affects you.   You can go slower if you need to or increasing only one dose at a time.  Do not stop abruptly once at higher doses.  This medication must be tapered off.    2. Consider over the counter lidocaine patches 4%, tiger balm, Biofreeze, or capsaicin cream.     3. When pain is severe, try using ice/heat, resting, using pillows to reposition for comfort, relaxation, deep breathing, or massage. Also try to stop doing what you are doing when the pain is building and rest.     4.  Potential procedures: may consider trigger point injections in the future. She is very interested in. Discuss at next visit and likely order.      5.  Consider acupuncture in future.    6.  Follow up with SUSAN Pierce CNP in 4 weeks.     Review of Electronic Chart: Today I have also reviewed available medical information in the patient's medical record at [facility] ([facility]), including relevant provider notes, laboratory work, and imaging.       I spent 60 minutes of time face to face with the patient.  Greater than 50% of this time was spent in patient counseling and/or coordination of care regarding principles of multidisciplinary care, medication management, and treatment options as discussed  above.      SUSAN Pierce CNP  [facility] [place]

## 2018-01-10 ENCOUNTER — OFFICE VISIT (OUTPATIENT)
Dept: PALLIATIVE MEDICINE | Facility: CLINIC | Age: 33
End: 2018-01-10
Payer: COMMERCIAL

## 2018-01-10 VITALS
SYSTOLIC BLOOD PRESSURE: 122 MMHG | DIASTOLIC BLOOD PRESSURE: 79 MMHG | HEART RATE: 87 BPM | OXYGEN SATURATION: 96 % | WEIGHT: 186 LBS | BODY MASS INDEX: 34.02 KG/M2

## 2018-01-10 DIAGNOSIS — M54.40 CHRONIC BILATERAL LOW BACK PAIN WITH SCIATICA, SCIATICA LATERALITY UNSPECIFIED: ICD-10-CM

## 2018-01-10 DIAGNOSIS — M54.6 CHRONIC BILATERAL THORACIC BACK PAIN: ICD-10-CM

## 2018-01-10 DIAGNOSIS — M79.7 FIBROMYALGIA: Primary | ICD-10-CM

## 2018-01-10 DIAGNOSIS — G89.29 CHRONIC BILATERAL THORACIC BACK PAIN: ICD-10-CM

## 2018-01-10 DIAGNOSIS — G89.29 CHRONIC BILATERAL LOW BACK PAIN WITH SCIATICA, SCIATICA LATERALITY UNSPECIFIED: ICD-10-CM

## 2018-01-10 PROCEDURE — 99205 OFFICE O/P NEW HI 60 MIN: CPT | Performed by: NURSE PRACTITIONER

## 2018-01-10 RX ORDER — GABAPENTIN 100 MG/1
300 CAPSULE ORAL 3 TIMES DAILY
Qty: 150 CAPSULE | Refills: 1 | Status: SHIPPED | OUTPATIENT
Start: 2018-01-10 | End: 2018-04-11

## 2018-01-10 ASSESSMENT — PAIN SCALES - GENERAL: PAINLEVEL: SEVERE PAIN (7)

## 2018-01-10 NOTE — NURSING NOTE
"Chief Complaint   Patient presents with     Pain       Initial /79  Pulse 87  Wt 84.4 kg (186 lb)  SpO2 96%  BMI 34.02 kg/m2 Estimated body mass index is 34.02 kg/(m^2) as calculated from the following:    Height as of 11/27/17: 1.575 m (5' 2\").    Weight as of this encounter: 84.4 kg (186 lb).      Patient prefers to be contacted via at Home.   Okay to leave detailed message: Yes  Patient uses MyChart: Yes    Brooke HASSAN LPN    "

## 2018-01-10 NOTE — MR AVS SNAPSHOT
After Visit Summary   1/10/2018    Mary Lerma    MRN: [id number]           Patient Information     Date Of Birth          1985        Visit Information        Provider Department      1/10/2018 11:00 AM Zaida Day APRN CNP [facility]        Today's Diagnoses     Fibromyalgia    -  1    Chronic bilateral thoracic back pain        Chronic bilateral low back pain with sciatica, sciatica laterality unspecified          Care Instructions    Diagnosis reviewed, treatment option addressed, and risk/benifits discussed.  Self-care instructions given.  I am recommending a multidisciplinary treatment plan to help this patient better manage her pain.       1.  Pain Physical Therapy:  YES   Schedule first visit with LOBO Villalta    2.  Pain Psychologist to address relaxation, behavioral change, coping style, and other factors important to improvement.  YES  Schedule first visit with Dr. Cade    3.  Medication Management:     1. Start gabapentin as directed below. Be mindful of your weight, weigh yourself twice weekly, let me know if you are gaining weight.  Gabapentin 1 tab= 100mg    AM   PM   Bedtime  0   0   100mg (1 tab).  After 3-5 days, increase as tolerated   100mg (1 tab)  0   100mg (1 tab).  After 3-5 days, increase as tolerated   100mg (1 tab)  100mg (1 tab)  100mg (1 tab).  After 3-5 days, increase as tolerated   100mg (1 tab)  100mg (1 tab)  200mg (2 tabs). After 3-5 days, increase as tolerated   200mg (2 tabs)  100mg (1 tab)  200mg (2 tabs). After 3-5 days, increase as tolerated   200mg (2 tabs)  200mg (2 tabs)  200mg (2 tabs). After 3-5 days, increase as tolerated   200mg (2 tabs)  200mg (2 tabs)  300mg (3 tabs). After 3-5 days, increase as tolerated   300mg (3 tabs)  200mg (2 tabs)  300mg (3 tabs). After 3-5 days, increase as tolerated   300mg (3 tabs)  300mg (3 tabs)  300mg (3 tabs).     Call with any problems.  Caution for sedation.    Do not drive until you know  how the medication affects you.   You can go slower if you need to or increasing only one dose at a time.  Do not stop abruptly once at higher doses.  This medication must be tapered off.    2. Consider over the counter lidocaine patches 4%, tiger balm, Biofreeze, or capsaicin cream.     3. When pain is severe, try using ice/heat, resting, using pillows to reposition for comfort, relaxation, deep breathing, or massage. Also try to stop doing what you are doing when the pain is building and rest.     4.  Potential procedures: may consider trigger point injections in the future.     5.  Consider acupuncture, if you are interested in this, I could place an order.   6.  Follow up with SUSAN Pierce CNP in 4 weeks.     Julianne Cade, Ph.D., L.P.   Clinical Health Psychologist  [email or web address]  [address] [place] [address]   [place] [address]  [phone]      ----------------------------------------------------------------  Nurse Triage line:  [phone]   Call this number with any questions or concerns. You may leave a detailed message anytime. Calls are typically returned Monday through Friday between 8 AM and 4:30 PM. We usually get back to you within 2 business days depending on the issue/request.       Medication refills:    For non-narcotic medications, call your pharmacy directly to request a refill. The pharmacy will contact the [facility] for authorization. Please allow 3-4 days for these refills to be processed.     For narcotic refills, call the nurse triage line or send a [employer] message. Please contact us 7-10 days before your refill is due. The message MUST include the name of the specific medication(s) requested and how you would like to receive the prescription(s). The options are as follows:    Pain Clinic staff can mail the prescription to your pharmacy. Please tell us the name of the pharmacy.    You may pick the prescription up at the Pain Clinic (tell us the location) or during a clinic  visit with your pain provider    Pain Clinic staff can deliver the prescription to the [facility] pharmacy in the clinic building. Please tell us the location.      Scheduling number: [phone].  Call this number to schedule or change appointments.    We believe regular attendance is key to your success in our program.    Any time you are unable to keep your appointment we ask that you call us at least 24 hours in advance to let us know. This will allow us to offer the appointment time to another patient.               Follow-ups after your visit        Additional Services     PAIN PHD EVAL/TREAT/FOLLOW UP           PAIN PT EVAL AND TREAT                 Who to contact     If you have questions or need follow up information about today's clinic visit or your schedule please contact [facility] directly at [phone].  Normal or non-critical lab and imaging results will be communicated to you by [employer], letter or phone within 4 business days after the clinic has received the results. If you do not hear from us within 7 days, please contact the clinic through [employer] or phone. If you have a critical or abnormal lab result, we will notify you by phone as soon as possible.  Submit refill requests through [employer] or call your pharmacy and they will forward the refill request to us. Please allow 3 business days for your refill to be completed.          Additional Information About Your Visit        [employer] Information     [employer] gives you secure access to your electronic health record. If you see a primary care provider, you can also send messages to your care team and make appointments. If you have questions, please call your primary care clinic.  If you do not have a primary care provider, please call [phone] and they will assist you.        Care EveryWhere ID     This is your Care EveryWhere ID. This could be used by other organizations to access your [facility] medical records  [id number]         Your Vitals Were     Pulse Pulse Oximetry BMI (Body Mass Index)             87 96% 34.02 kg/m2          Blood Pressure from Last 3 Encounters:   01/10/18 122/79   11/27/17 122/66   11/17/17 98/58    Weight from Last 3 Encounters:   01/10/18 84.4 kg (186 lb)   11/27/17 81.2 kg (179 lb)   11/17/17 79.8 kg (176 lb)              We Performed the Following     PAIN PHD EVAL/TREAT/FOLLOW UP     PAIN PT EVAL AND TREAT          Today's Medication Changes          These changes are accurate as of: 1/10/18 12:02 PM.  If you have any questions, ask your nurse or doctor.               Start taking these medicines.        Dose/Directions    gabapentin 100 MG capsule   Commonly known as:  NEURONTIN   Used for:  Fibromyalgia, Chronic bilateral thoracic back pain, Chronic bilateral low back pain with sciatica, sciatica laterality unspecified   Started by:  Zaida Day APRN CNP        Dose:  300 mg   Take 3 capsules (300 mg) by mouth 3 times daily   Quantity:  150 capsule   Refills:  1            Where to get your medicines      These medications were sent to [facility] [address] [place] [address] [facility] [address]  [address] [facility] [address]     Phone:  [phone]     gabapentin 100 MG capsule                Primary Care Provider Office Phone # Fax #    Elena KARL Mcdonough [phone]       [facility] [place] [address] [place] [id number]        Equal Access to Services     ANDERSON DARNELL AH: Hadii aad ku hadasho Soomaali, waaxda luqadaha, qaybta kaalmada adeegyada, waxay ivette yanez. So [facility] [phone].    ATENCIÓN: Si habla español, tiene a myers disposición servicios gratuitos de asistencia lingüística. Llame al [phone].    We comply with applicable federal civil rights laws and [place] laws. We do not discriminate on the basis of race, color, national origin, age, disability, sex, sexual orientation, or gender  identity.            Thank you!     Thank you for choosing [facility]  for your care. Our goal is always to provide you with excellent care. Hearing back from our patients is one way we can continue to improve our services. Please take a few minutes to complete the written survey that you may receive in the mail after your visit with us. Thank you!             Your Updated Medication List - Protect others around you: Learn how to safely use, store and throw away your medicines at www.disposemymeds.org.          This list is accurate as of: 1/10/18 12:02 PM.  Always use your most recent med list.                   Brand Name Dispense Instructions for use Diagnosis    DULoxetine 30 MG EC capsule    CYMBALTA    60 capsule    Take 1 capsule (30 mg) by mouth 2 times daily    Fibromyalgia       gabapentin 100 MG capsule    NEURONTIN    150 capsule    Take 3 capsules (300 mg) by mouth 3 times daily    Fibromyalgia, Chronic bilateral thoracic back pain, Chronic bilateral low back pain with sciatica, sciatica laterality unspecified       medroxyPROGESTERone 150 MG/ML injection    DEPO-PROVERA    1 mL    Inject 1 mL (150 mg) into the muscle every 3 months    Encounter for initial prescription of injectable contraceptive       SUMAtriptan 100 MG tablet    IMITREX    18 tablet    Take 1 tablet (100 mg) by mouth at onset of headache for migraine May repeat in 2 hours. Max 2 tablets/24 hours.    Other migraine without status migrainosus, not intractable

## 2018-01-10 NOTE — PATIENT INSTRUCTIONS
Diagnosis reviewed, treatment option addressed, and risk/benifits discussed.  Self-care instructions given.  I am recommending a multidisciplinary treatment plan to help this patient better manage her pain.       1.  Pain Physical Therapy:  YES   Schedule first visit with LOBO Villalta    2.  Pain Psychologist to address relaxation, behavioral change, coping style, and other factors important to improvement.  YES  Schedule first visit with Dr. Cade    3.  Medication Management:     1. Start gabapentin as directed below. Be mindful of your weight, weigh yourself twice weekly, let me know if you are gaining weight.  Gabapentin 1 tab= 100mg    AM   PM   Bedtime  0   0   100mg (1 tab).  After 3-5 days, increase as tolerated   100mg (1 tab)  0   100mg (1 tab).  After 3-5 days, increase as tolerated   100mg (1 tab)  100mg (1 tab)  100mg (1 tab).  After 3-5 days, increase as tolerated   100mg (1 tab)  100mg (1 tab)  200mg (2 tabs). After 3-5 days, increase as tolerated   200mg (2 tabs)  100mg (1 tab)  200mg (2 tabs). After 3-5 days, increase as tolerated   200mg (2 tabs)  200mg (2 tabs)  200mg (2 tabs). After 3-5 days, increase as tolerated   200mg (2 tabs)  200mg (2 tabs)  300mg (3 tabs). After 3-5 days, increase as tolerated   300mg (3 tabs)  200mg (2 tabs)  300mg (3 tabs). After 3-5 days, increase as tolerated   300mg (3 tabs)  300mg (3 tabs)  300mg (3 tabs).     Call with any problems.  Caution for sedation.    Do not drive until you know how the medication affects you.   You can go slower if you need to or increasing only one dose at a time.  Do not stop abruptly once at higher doses.  This medication must be tapered off.    2. Consider over the counter lidocaine patches 4%, tiger balm, Biofreeze, or capsaicin cream.     3. When pain is severe, try using ice/heat, resting, using pillows to reposition for comfort, relaxation, deep breathing, or massage. Also try to stop doing what you are doing when the pain is building  and rest.     4.  Potential procedures: may consider trigger point injections in the future.     5.  Consider acupuncture, if you are interested in this, I could place an order.   6.  Follow up with SUSAN Pierce CNP in 4 weeks.     Julianne Cade, Ph.D., L.P.   Clinical Health Psychologist  [email or web address]   [place] [address]  [phone]      ----------------------------------------------------------------  Nurse Triage line:  [phone]   Call this number with any questions or concerns. You may leave a detailed message anytime. Calls are typically returned Monday through Friday between 8 AM and 4:30 PM. We usually get back to you within 2 business days depending on the issue/request.       Medication refills:    For non-narcotic medications, call your pharmacy directly to request a refill. The pharmacy will contact the [facility] for authorization. Please allow 3-4 days for these refills to be processed.     For narcotic refills, call the nurse triage line or send a [employer] message. Please contact us 7-10 days before your refill is due. The message MUST include the name of the specific medication(s) requested and how you would like to receive the prescription(s). The options are as follows:    Pain Clinic staff can mail the prescription to your pharmacy. Please tell us the name of the pharmacy.    You may pick the prescription up at the Pain Clinic (tell us the location) or during a clinic visit with your pain provider    Pain Clinic staff can deliver the prescription to the [facility] pharmacy in the clinic building. Please tell us the location.      Scheduling number: [phone].  Call this number to schedule or change appointments.    We believe regular attendance is key to your success in our program.    Any time you are unable to keep your appointment we ask that you call us at least 24 hours in advance to let us know. This will allow us to offer the appointment time to  another patient.

## 2018-01-18 ENCOUNTER — TELEPHONE (OUTPATIENT)
Dept: PALLIATIVE MEDICINE | Facility: CLINIC | Age: 33
End: 2018-01-18

## 2018-01-18 NOTE — TELEPHONE ENCOUNTER
Received call from patient stating that she called yesterday and hadn't heard back from anyone. Advised that there wasn't any documentation of her calling and apologized. Patient is wanting a prescription for pain medicine. She states by the end of the day she is in a lot of pain and states she can barely do anything. She has been taking gabapentin and cymbalta and wants something more. Phone # [phone] - ok to leave message      Kristyn Puri    [facility]

## 2018-01-18 NOTE — TELEPHONE ENCOUNTER
Received call from patient who stated she hadn't heard back in regards to her medication request and to see if we had written a prescription for her. Advised that her original message was sent to the nursing staff at 3:00PM and that the nursing staff hadn't had time to address her request yet.       Kristyn Puri    [facility]

## 2018-01-19 NOTE — TELEPHONE ENCOUNTER
Called patient. Advised that we do not prescribe on the first visit. Explained UDS and OC first and that I do not see it indicated as being discussed at this point. She asked if we just expected her to be in pain and deal with it. Advised that she was recommended to see pain PT and PhD. That both of these would give her ways to cope and manage her pain for both the daily and for her flares. Encouraged her to make these appointments. Also advised that she is just beginning to start her Gabapentin increase, this will take time to get full benefit. Inquired about lidocaine creams/tiger balms ect as indicated in AVS, she states that she is taking using these. Reviewed self cares of AVS as well.  Reminded that we are only beginning to know her, she was seen 9 days ago, this will take time. Advised that she was recommended to f/u in 4 weeks and discuss possible TPI. Offered to assist in making appts (provider and PT), patient declined at this time stating that she needed to check her schedule first.       Per 01/10/18 OV note:  1.  Pain Physical Therapy:  YES   Schedule first visit with LOBO Villalta. Strongly encouraged regular physical activity and water aerobics.                          2.  Pain Psychologist to address relaxation, behavioral change, coping style, and other factors important to improvement.  YES  Schedule first visit with Dr. Cade                         3.  Medication Management:                                               1. Start gabapentin as directed below. Mary is concerned about side effect of weight gain. She will monitor her weight and contact me if she is gaining weight.    Gabapentin 1 tab= 100mg     AM                                                                     PM                                                                     Bedtime  0                                                                         0                                                                          100mg (1 tab).  After 3-5 days, increase as tolerated   100mg (1 tab)                                          0                                                                         100mg (1 tab).  After 3-5 days, increase as tolerated   100mg (1 tab)                                          100mg (1 tab)                                          100mg (1 tab).  After 3-5 days, increase as tolerated   100mg (1 tab)                                          100mg (1 tab)                                          200mg (2 tabs). After 3-5 days, increase as tolerated   200mg (2 tabs)                                        100mg (1 tab)                                          200mg (2 tabs). After 3-5 days, increase as tolerated   200mg (2 tabs)                                        200mg (2 tabs)                                        200mg (2 tabs). After 3-5 days, increase as tolerated   200mg (2 tabs)                                        200mg (2 tabs)                                        300mg (3 tabs). After 3-5 days, increase as tolerated   300mg (3 tabs)                                        200mg (2 tabs)                                        300mg (3 tabs). After 3-5 days, increase as tolerated   300mg (3 tabs)                                        300mg (3 tabs)                                        300mg (3 tabs).      Call with any problems.  Caution for sedation.    Do not drive until you know how the medication affects you.   You can go slower if you need to or increasing only one dose at a time.  Do not stop abruptly once at higher doses.  This medication must be tapered off.                                              2. Consider over the counter lidocaine patches 4%, tiger balm, Biofreeze, or capsaicin cream.                                               3. When pain is severe, try using ice/heat, resting, using pillows to reposition for comfort, relaxation, deep breathing, or massage.  Also try to stop doing what you are doing when the pain is building and rest.                          4.  Potential procedures: may consider trigger point injections in the future. She is very interested in. Discuss at next visit and likely order.                           5.  Consider acupuncture in future.                         6.  Follow up with SUSAN Pierce CNP in 4 weeks.     Zainab GONZALEZN-RN Care Coordinator  [facility]

## 2018-01-26 ENCOUNTER — ALLIED HEALTH/NURSE VISIT (OUTPATIENT)
Dept: NURSING | Facility: CLINIC | Age: 33
End: 2018-01-26
Payer: COMMERCIAL

## 2018-01-26 DIAGNOSIS — Z30.42 DEPO-PROVERA CONTRACEPTIVE STATUS: Primary | ICD-10-CM

## 2018-01-26 PROCEDURE — 96372 THER/PROPH/DIAG INJ SC/IM: CPT

## 2018-02-02 ENCOUNTER — TELEPHONE (OUTPATIENT)
Dept: PALLIATIVE MEDICINE | Facility: CLINIC | Age: 33
End: 2018-02-02

## 2018-02-02 DIAGNOSIS — M79.18 MYOFASCIAL PAIN: Primary | ICD-10-CM

## 2018-02-02 NOTE — TELEPHONE ENCOUNTER
Routing to  to schedule TPI with patient.     Janet GONZALEZN-RN Care Coordinator  [facility]

## 2018-02-02 NOTE — TELEPHONE ENCOUNTER
Routing to provider to review.     Janet GONZALEZN-RN Care Coordinator  [facility]

## 2018-02-02 NOTE — TELEPHONE ENCOUNTER
Patient would like to schedule TPI, please place appropriate order.      Romina RUTHERFORD    [facility]

## 2018-02-02 NOTE — TELEPHONE ENCOUNTER
Left VM for patient to schedule TPI.        Romina RUTHERFORD    [facility]

## 2018-02-05 ENCOUNTER — OFFICE VISIT (OUTPATIENT)
Dept: PALLIATIVE MEDICINE | Facility: CLINIC | Age: 33
End: 2018-02-05
Payer: COMMERCIAL

## 2018-02-05 DIAGNOSIS — M54.6 CHRONIC BILATERAL THORACIC BACK PAIN: ICD-10-CM

## 2018-02-05 DIAGNOSIS — M79.7 FIBROMYALGIA: ICD-10-CM

## 2018-02-05 DIAGNOSIS — G89.29 CHRONIC PAIN: Primary | ICD-10-CM

## 2018-02-05 DIAGNOSIS — G89.29 CHRONIC BILATERAL THORACIC BACK PAIN: ICD-10-CM

## 2018-02-05 PROCEDURE — 97162 PT EVAL MOD COMPLEX 30 MIN: CPT | Mod: GP | Performed by: PHYSICAL THERAPIST

## 2018-02-05 PROCEDURE — 97530 THERAPEUTIC ACTIVITIES: CPT | Mod: GP | Performed by: PHYSICAL THERAPIST

## 2018-02-05 NOTE — PROGRESS NOTES
[facility]    Date of visit: 2/6/2018    Chief complaint:   Chief Complaint   Patient presents with     Pain       Interval history:  Mary Lerma was last seen on 1/10/18.      Recommendations/plan at the last visit included:                  1.  Pain Physical Therapy:  YES   Schedule first visit with LOBO Villalta. Strongly encouraged regular physical activity and water aerobics.                          2.  Pain Psychologist to address relaxation, behavioral change, coping style, and other factors important to improvement.  YES  Schedule first visit with Dr. Cade                         3.  Medication Management:                                               1. Start gabapentin as directed below. Mary is concerned about side effect of weight gain. She will monitor her weight and contact me if she is gaining weight.    Gabapentin 1 tab= 100mg     AM                                                                     PM                                                                     Bedtime  0                                                                         0                                                         100mg (1 tab).  After 3-5 days, increase as tolerated   100mg (1 tab)                                          0                                                                 100mg (1 tab).  After 3-5 days, increase as tolerated   100mg (1 tab)                                          100mg (1 tab)                                          100mg (1 tab).  After 3-5 days, increase as tolerated   100mg (1 tab)                                          100mg (1 tab)                                          200mg (2 tabs). After 3-5 days, increase as tolerated   200mg (2 tabs)                                        100mg (1 tab)                                          200mg (2 tabs). After 3-5 days, increase as tolerated   200mg (2 tabs)                                         200mg (2 tabs)                                        200mg (2 tabs). After 3-5 days, increase as tolerated   200mg (2 tabs)                                        200mg (2 tabs)                                        300mg (3 tabs). After 3-5 days, increase as tolerated   300mg (3 tabs)                                        200mg (2 tabs)                                        300mg (3 tabs). After 3-5 days, increase as tolerated   300mg (3 tabs)                                        300mg (3 tabs)                                        300mg (3 tabs).      Call with any problems.  Caution for sedation.    Do not drive until you know how the medication affects you.   You can go slower if you need to or increasing only one dose at a time.  Do not stop abruptly once at higher doses.  This medication must be tapered off.                                              2. Consider over the counter lidocaine patches 4%, tiger balm, Biofreeze, or capsaicin cream.                                               3. When pain is severe, try using ice/heat, resting, using pillows to reposition for comfort, relaxation, deep breathing, or massage. Also try to stop doing what you are doing when the pain is building and rest.                          4.  Potential procedures: may consider trigger point injections in the future. She is very interested in. Discuss at next visit and likely order.                           5.  Consider acupuncture in future.                         6.  Follow up with SUSAN Pierce CNP in 4 weeks    Since her last visit, Mary Lerma reports:  -Her pain is worse than it was at last visit. She attributes part of this to be due to the cold weather and being busy.   -She has been taking gabapentin as prescribed, didn't realize she was to continue increasing so is currently taking 100mg TID. Has not noticed a benefit yet.   -She states her mid back has been most painful  recently. She wonders if the correct imaging was ordered after an injury in May 2017. She is interested in having further imaging of thoracic spine.  -She had her first visit with LOBO Villalta. She plans to continue to see her. [facility] Ambar recommended further evaluation of mid back.   -[facility] January was a busy month for her.  -She has not made an appointment with Dr. Cade, plans to do so soon.   -She tried lidocaine patches, tiger balm, Biofreeze, and capsaicin cream with minimal improvement in pain.   -She has an appointment for trigger point injections scheduled with Dr. Silverio on 2/19/18.     Pain scores:   Pain quality: Miserable, Sharp, Stabbing and Unbearable    Pain timing: Constant     Pain rating: intensity ranges from 4/10 to 9/10, and averages 6/10 on a 0-10 scale.   Aggravating factors include: activity   Relieving factors include: heating pad    Annual Controlled Substance Agreement/UDS due date: N/A    Current pain treatments:    Gabapentin 300mg TID   Cymbalta 30mg BID- H for mood, unsure if H for joint pain      Imitrex 100mg- H    Current MME: 0    Past pain treatments:  1. Previous Pain Relevant Medications:  (H--helped; HI--Helped initially; [facility]--Somewhat helpful; NH--No help; W--worse; SE--side effects; ?--Unsure if helpful)   NOTE: This medication information taken from patient's intake form, not medical records.                         Opiates: Norco- [facility], tramadol- [facility], Percocet- ?                        NSAIDS: Ibuprofen- NH                        Muscle Relaxants: Flexeril- ?, SE, sleepy                        Anti-migraine mediations: Imitrex- H                        Anti-depressants: Cymbalta- H for mood, Zoloft- ?                        Sleep aids: Melatonin- H                        Anxiolytics: no                        Neuropathics: no                                           Topicals: no                        Other medications not covered above: Tylenol- NH     2. Physical  Therapy: yes traditional- NH  3. Surgery: on right knee x2 (ACL/MCL/meniscus repair), carpal tunnel release right 2016  4. Injections: no  5. Chiropractic: yes- NH  6. Acupuncture: no  7. TENS Unit: yes- [facility]    [place] Board of Pharmacy Data Base Reviewed:    YES; As expected, no concern for misuse/abuse of controlled medications based on this report.    Medications:  Current Outpatient Prescriptions   Medication Sig Dispense Refill     diazepam (VALIUM) 10 MG tablet Take 1 tablet (10 mg) by mouth once for 1 dose Take 30-60 minutes before MRI.  Do not operate a vehicle after taking this medication. 1 tablet 0     methocarbamol (ROBAXIN) 500 MG tablet Take 1-2 tablets (500-1,000 mg) by mouth 3 times daily as needed for muscle spasms 75 tablet 0     gabapentin (NEURONTIN) 100 MG capsule Take 3 capsules (300 mg) by mouth 3 times daily 150 capsule 1     DULoxetine (CYMBALTA) 30 MG EC capsule Take 1 capsule (30 mg) by mouth 2 times daily 60 capsule 3     SUMAtriptan (IMITREX) 100 MG tablet Take 1 tablet (100 mg) by mouth at onset of headache for migraine May repeat in 2 hours. Max 2 tablets/24 hours. 18 tablet 1     medroxyPROGESTERone (DEPO-PROVERA) 150 MG/ML injection Inject 1 mL (150 mg) into the muscle every 3 months 1 mL 3       Medical History: any changes in medical history since they were last seen? No    Imaging:  MRI of lumbar spine was completed on 6/1/17 and shows:  FINDINGS: There are four nonrib-bearing lumbar-type vertebra. The S1  segment appears to be possibly transitional and partially lumbarized  on the right. Rudimentary disc is noted at S1-S2. Apophyseal joints  appear essentially within normal limits. Disc signal and disc height  appears within normal limits throughout the lumbar spine. Vertebral  body heights and sagittal alignment are within normal limits. The  conus medullaris is unremarkable in appearance on the sagittal images.  Marrow signal is within normal limits. Transverse images from L1  "to  the sacrum reveal no evidence of disc bulge or herniation. There is no  central or foraminal stenosis.          IMPRESSION:  1. 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 The transitional segment, labeled S1  for the purposes of this report, may be partially lumbarized on the  left.  2. No lumbar disc degenerative loss of signal or herniation. No lumbar  stenosis or apparent neural impingement.     Review of Systems:  The 14 system ROS was reviewed from the intake questionnaire, and is positive for: back pain, neck pain.  Any bowel or bladder problems: denies  Mood: \"tired\"    Physical Exam:  Blood pressure 112/71, pulse 89, weight 85.3 kg (188 lb), SpO2 97 %, not currently breastfeeding.  General: A&O x4, no signs of distress.   Gait: Normal  MSK exam: 5/5 upper and lower extremity strength. Tender points.     Assessment:   Mary Lerma is a 32 year old female with a past medical history significant for migraines who presents with complaints of back and widespread pain.      1. Back pain- etiology unclear, chronic in nature, likely related to fibromyalgia, MRI of thoracic spine ordered today for further evaluation.   2. Widespread pain- etiology likely fibromyalgia.   3. Mental Health - the patient's mental health concerns, specifically situational depression, affect her experience of pain and contribute to her clinically significant distress.      1. Chronic bilateral thoracic back pain    2. Fibromyalgia    3. Claustrophobia    4. Muscle spasm          Plan:     1.  Pain Physical Therapy:  YES   Continue to see LOBO Villalta as recommended.    2.  Pain Psychologist to address relaxation, behavioral change, coping style, and other factors important to improvement.  YES  Continue to see Dr. Cade as recommended.    3.  Diagnostic Studies:  I ordered a thoracic MRI today. They will call " to schedule. Just in case: [phone]. Take one time dose of Valium 30-60 minutes prior to MRI. I will MyChart with results.    4.  Medication Management:     1. Continue gabapentin increase as directed below. Would like to titrate up to 600mg TID to determine pain benefit prior to moving on.   Gabapentin 1 tab= 100mg    AM   PM   Bedtime  100mg (1 tab)  100mg (1 tab)  200mg (2 tabs). After 3-5 days, increase as tolerated   200mg (2 tabs)  100mg (1 tab)  200mg (2 tabs). After 3-5 days, increase as tolerated   200mg (2 tabs)  200mg (2 tabs)  200mg (2 tabs). After 3-5 days, increase as tolerated   200mg (2 tabs)  200mg (2 tabs)  300mg (3 tabs). After 3-5 days, increase as tolerated   300mg (3 tabs)  200mg (2 tabs)  300mg (3 tabs). After 3-5 days, increase as tolerated   300mg (3 tabs)  300mg (3 tabs)  300mg (3 tabs).     Call with any problems.  Caution for sedation.    Do not drive until you know how the medication affects you.   You can go slower if you need to or increasing only one dose at a time.  Do not stop abruptly once at higher doses.  This medication must be tapered off.     2. Flexeril has been somewhat helpful in the past but is sedating, she is interested in a different muscle relaxant. Prescription for Robaxin today. Take 1-2 tablets as needed for muscle spasm. We will discuss effectiveness at next visit. Continue nonpharmacologic measures as well.    5.  Potential procedures: have trigger point injections as scheduled.     6.  Follow up with SUSAN Pierce CNP in 4 weeks.     Total time spent was 30 minutes, and more than 50% of face to face time was spent in counseling and/or coordination of care regarding chronic pain management.      SUSAN Pierce CNP  [facility]

## 2018-02-05 NOTE — TELEPHONE ENCOUNTER
Chart check, scheduled for 02/19/18. Closing    Zainab Dorsey  BSN-RN Care Coordinator  [facility]

## 2018-02-05 NOTE — PROGRESS NOTES
"PHYSICAL THERAPY INITIAL EVALUATION and PLAN OF CARE    Patient Name: Mary Lerma     : 1985    MRN: [id number]   Pain Management Provider:  SUSAN Hernandez CNP    Diagnosis:    Chronic pain  Chronic bilateral thoracic back pain  Fibromyalgia    SUBJECTIVE:    PRESENTATION AND ETIOLOGY    Chief Complaint: mid and low back pain; worse over the last 6-12 months; pain located around T-L junction and T7-8 area; right > left      Onset / Etiology: low back pain as a teenager    Pattern Since Onset: Worsened    Pain is described as miserable, shooting, unbearable, exhausting, sharp, stabbing      Frequency: Constant      Intensity: Best 3/10, Worst 10/10;  Current 8/10 ; Average 7/10    Fatigue Level: (scale 0-10)  6-7/10 average    LEVEL OF FUNCTION AT START OF CARE    Walking tolerance: 10 minutes  Sitting tolerance: 10 minutes  Standing tolerance: 5 minutes  Housework tolerance: 10 minutes; tends to \"do it all and then take a rest\"  Sleep: delayed onset x 30-60 minutes; wakes 3-4 x/night    Current Aggrevating Activities / Functional Limitations: too much activity      CURRENT / PREVIOUS INTERVENTION(S):     Relieving Activities / Self Care: heating pack, water aerobics,tape on back, stretches    Previous / Current therapies for current chief complaint: PT not helpful      DEMOGRAPHICS  Employment Status: works full time as HUC at [employer]    Social Support: lives with , two children ages 8 and 4 yrs, cat , dog    Patient's perceived quality of life:  good    Pertinent Medical  / Surgical History: Epic Snapshot Reviewed, See provider's note; had gastric sleeve 3 years ago and trying to avoid weight gain due to medication    Patient's goals for physical therapy: have less back pain so able to be more physically active and improve mood irritability    ===============================================================  OBJECTIVE:  POSTURE:  Observation: Patient demonstrates forward " "head, protracted shoulders and thoracic kyphosis in standing and sitting posture.  Noted protective guarding with hands folded across upper abdomen.  Restricted exhalation. Tends to stop exhalation in response to painful movements  Difficulty with lying supine due to extended lower thoracic spine.  Tolerates supine position better with manual support to lower thoracic spine      GAIT, LOCOMOTION, and BALANCE:  Gait and Locomotion: normal velocity; tends to fold arms across abdomen to support lower thoracic spine  Balance: Pt able to perform single leg standing balance for 20 seconds on the left LE and 20 seconds on the right LE      RANGE OF MOTION:   Cervical: AROM WFL all directions  Lumbar: AROM limited to 25% all directions due to segmental restrictions and muscle guarding T10-T12  Shoulders: noted increased lower thoracic back pain with reaching forward and overhead; right >left  Hips: unable to perform supine hip flexion due to \"pulling pain\" in back      MUSCLE PERFORMANCE:   Strength: demonstrates core instability      FUNCTIONAL TESTING/OBSERVATION: independent chair and bed mobility; see above  Restricted segmental mobility lower thoracic spine approximately T10-T 12    Pain behaviors: None    ===============================================================  Today's Treatment:  Initial evaluation  Therapeutic Activity:   For 15 minutes including Pt educated on the concept of the nervous system as a hypersensitive and hyperactive alarm system and the role of physical therapy in desensitizing the nerves and reducing pain. Patient was educated about the importance of beginning body awareness, mini breaks and pacing principles.  Focus next session:  Self cares, breathing, pacing, body scan, cardio  Recommend further imaging for mid-lower thoracic spine prior to progression of HEP.    ===============================================================  ASSESSMENT:  Physical Therapy Diagnosis:Impaired Posture and " Impaired Muscle Performance    Patient requires PT intervention for the following impairments: Limited knowledge of condition and / or self care - inability to control symptoms, Impaired functional mobility, Impaired posture / muscle imbalance, Joint hypomobility, Muscle flexibility limitations, Pain, ROM limitations and Deconditioning    Anticipated Goals and Expected Outcomes:  8 weeks  Patient will report the use of 2 self care practices during their day.  Patient will report the participation in 20-30 minutes of aerobic activity daily and practicing stretching daily.  Patient will demonstrate the ability to find core strength in neutral posture.  Patient will demonstrate the ability to relax muscle group before stretching.  16 weeks  Patient will be independent with a home exercise program.  Patient will be independent with posture correction.  Patient will report independence with a self care/flare management program.  Patient will demonstrate improved functional strength and endurance as reports by increased tolerance for IADLs and more consistent participation in daily activity.     Rehab potential for achieving goals: excellent.    ===============================================================  PLAN:   Patient will benefit from skilled physical therapy consisting of:  neuromuscular reeducation of: kinesthetic sense and posture for sitting and/or standing activities, education in self care / home management training to include instruction in: symptom control techniques, therapeutic activities to achieve improved functional performance in: daily actvities and therapeutic exercise to develop: strength and endurance, flexibility and core stability    Assessment will be ongoing with changes in treatment as indicated.  Benefits/risks/alternatives to treatment have been reviewed and the patient has been instructed to contact this office if they have any questions or concerns.  This plan of care has been discussed  with the patient and the patient is in agreement.     Frequency / Duration:  Patient will be seen for a total of 13 visits; 16 weeks    Total Visit Time: 45  minutes            Ambar Amanda, PT                                      Date:  2/5/2018      =====================================================  **  Referring Provider Certification: Referring Provider reviewing certifies that the above treatment / plan of care is required and authorized, and that the patient's plan will be reviewed every thirty (30) days **.   ======================================================     PRESENT:  NA    MULTIDISCIPLINARY PATIENT / FAMILY EDUCATION RECORD  Department:  Physical Therapy    Readiness to Learn: Ability to understand verbal instructions, Ability to understand written instructions, Knowledge of educational needs / treatment plan  Specific Barriers to Learning: None  Referrals: None  Learning Needs: Rehabilitation techniques to improve functional independence Pain management education to improve daily activity tolerance.  Who: Patient  How: Demonstration, Verbal instructions, Written instructions  Response: Appropriate verbal response, Asked questions, Demonstrated ability, Verbalized recall / understanding

## 2018-02-05 NOTE — MR AVS SNAPSHOT
After Visit Summary   2/5/2018    Mary Lerma    MRN: [id number]           Patient Information     Date Of Birth          1985        Visit Information        Provider Department      2/5/2018 9:30 AM Ambar Amanda PT [facility]        Today's Diagnoses     Chronic pain    -  1    Chronic bilateral thoracic back pain        Fibromyalgia           Follow-ups after your visit        Your next 10 appointments already scheduled     Feb 06, 2018 10:00 AM CST   Return Visit with SUSAN Mosher CNP   [facility] ([facility])    [address] [address] [address]   [phone]            Feb 19, 2018  1:30 PM CST   PROCEDURE with Ike Sivlerio MD   [facility] ([facility])    [address] [address] [address]   [phone]              Who to contact     If you have questions or need follow up information about today's clinic visit or your schedule please contact [facility] directly at [phone].  Normal or non-critical lab and imaging results will be communicated to you by [employer], letter or phone within 4 business days after the clinic has received the results. If you do not hear from us within 7 days, please contact the clinic through [employer] or phone. If you have a critical or abnormal lab result, we will notify you by phone as soon as possible.  Submit refill requests through [employer] or call your pharmacy and they will forward the refill request to us. Please allow 3 business days for your refill to be completed.          Additional Information About Your Visit        [employer] Information     [employer] gives you secure access to your electronic health record. If you see a primary care provider, you can also send messages to your care team and make appointments. If you have questions, please call your primary care  clinic.  If you do not have a primary care provider, please call [phone] and they will assist you.        Care EveryWhere ID     This is your Care EveryWhere ID. This could be used by other organizations to access your [facility] medical records  [id number]         Blood Pressure from Last 3 Encounters:   01/10/18 122/79   11/27/17 122/66   11/17/17 98/58    Weight from Last 3 Encounters:   01/10/18 84.4 kg (186 lb)   11/27/17 81.2 kg (179 lb)   11/17/17 79.8 kg (176 lb)              We Performed the Following     HC PT EVAL, MODERATE COMPLEXITY     THERAPEUTIC ACTIVITIES        Primary Care Provider Office Phone # Fax #    Elena Mcdonough [phone]       [facility] [address] [address] [facility] [address]        Equal Access to Services     [facility] : Hadii aad ku hadasho Soomaali, waaxda luqadaha, qaybta kaalmada adeegyada, waxay idiin hayaan adeamy kharanick steward . So [facility] [phone].    ATENCIÓN: Si habla español, tiene a myers disposición servicios gratuitos de asistencia lingüística. Llame al [phone].    We comply with applicable federal civil rights laws and [place] laws. We do not discriminate on the basis of race, color, national origin, age, disability, sex, sexual orientation, or gender identity.            Thank you!     Thank you for choosing [facility]  for your care. Our goal is always to provide you with excellent care. Hearing back from our patients is one way we can continue to improve our services. Please take a few minutes to complete the written survey that you may receive in the mail after your visit with us. Thank you!             Your Updated Medication List - Protect others around you: Learn how to safely use, store and throw away your medicines at www.disposemymeds.org.          This list is accurate as of 2/5/18 10:31 AM.  Always use your most recent med list.                   Brand Name Dispense Instructions for use Diagnosis     DULoxetine 30 MG EC capsule    CYMBALTA    60 capsule    Take 1 capsule (30 mg) by mouth 2 times daily    Fibromyalgia       gabapentin 100 MG capsule    NEURONTIN    150 capsule    Take 3 capsules (300 mg) by mouth 3 times daily    Fibromyalgia, Chronic bilateral thoracic back pain, Chronic bilateral low back pain with sciatica, sciatica laterality unspecified       medroxyPROGESTERone 150 MG/ML injection    DEPO-PROVERA    1 mL    Inject 1 mL (150 mg) into the muscle every 3 months    Encounter for initial prescription of injectable contraceptive       SUMAtriptan 100 MG tablet    IMITREX    18 tablet    Take 1 tablet (100 mg) by mouth at onset of headache for migraine May repeat in 2 hours. Max 2 tablets/24 hours.    Other migraine without status migrainosus, not intractable

## 2018-02-06 ENCOUNTER — OFFICE VISIT (OUTPATIENT)
Dept: PALLIATIVE MEDICINE | Facility: CLINIC | Age: 33
End: 2018-02-06
Payer: COMMERCIAL

## 2018-02-06 VITALS
HEART RATE: 89 BPM | SYSTOLIC BLOOD PRESSURE: 112 MMHG | OXYGEN SATURATION: 97 % | WEIGHT: 188 LBS | DIASTOLIC BLOOD PRESSURE: 71 MMHG | BODY MASS INDEX: 34.39 KG/M2

## 2018-02-06 DIAGNOSIS — M54.6 CHRONIC BILATERAL THORACIC BACK PAIN: Primary | ICD-10-CM

## 2018-02-06 DIAGNOSIS — F40.240 CLAUSTROPHOBIA: ICD-10-CM

## 2018-02-06 DIAGNOSIS — M62.838 MUSCLE SPASM: ICD-10-CM

## 2018-02-06 DIAGNOSIS — G89.29 CHRONIC BILATERAL THORACIC BACK PAIN: Primary | ICD-10-CM

## 2018-02-06 DIAGNOSIS — M79.7 FIBROMYALGIA: ICD-10-CM

## 2018-02-06 PROCEDURE — 99214 OFFICE O/P EST MOD 30 MIN: CPT | Performed by: NURSE PRACTITIONER

## 2018-02-06 RX ORDER — METHOCARBAMOL 500 MG/1
500-1000 TABLET, FILM COATED ORAL 3 TIMES DAILY PRN
Qty: 75 TABLET | Refills: 0 | Status: SHIPPED | OUTPATIENT
Start: 2018-02-06 | End: 2018-03-09

## 2018-02-06 RX ORDER — DIAZEPAM 10 MG
10 TABLET ORAL ONCE
Qty: 1 TABLET | Refills: 0 | Status: SHIPPED | OUTPATIENT
Start: 2018-02-06 | End: 2018-02-06

## 2018-02-06 ASSESSMENT — PAIN SCALES - GENERAL: PAINLEVEL: EXTREME PAIN (8)

## 2018-02-06 NOTE — PATIENT INSTRUCTIONS
Diagnosis reviewed, treatment option addressed, and risk/benifits discussed.  Self-care instructions given.  I am recommending a multidisciplinary treatment plan to help this patient better manage her pain.       1.  Pain Physical Therapy:  YES   Continue to see LOBO Villalta as recommended.    2.  Pain Psychologist to address relaxation, behavioral change, coping style, and other factors important to improvement.  YES  Continue to see Dr. Cade as recommended.    3.  Diagnostic Studies:  I ordered your thoracic MRI today. They will call you to schedule. Just in case: [phone]. Take one time dose of Valium 30-60 minutes prior to MRI. I will MyChart you with results.    4.  Medication Management:     1. Continue gabapentin increase as directed below.  Gabapentin 1 tab= 100mg    AM   PM   Bedtime  100mg (1 tab)  100mg (1 tab)  200mg (2 tabs). After 3-5 days, increase as tolerated   200mg (2 tabs)  100mg (1 tab)  200mg (2 tabs). After 3-5 days, increase as tolerated   200mg (2 tabs)  200mg (2 tabs)  200mg (2 tabs). After 3-5 days, increase as tolerated   200mg (2 tabs)  200mg (2 tabs)  300mg (3 tabs). After 3-5 days, increase as tolerated   300mg (3 tabs)  200mg (2 tabs)  300mg (3 tabs). After 3-5 days, increase as tolerated   300mg (3 tabs)  300mg (3 tabs)  300mg (3 tabs).     Call with any problems.  Caution for sedation.    Do not drive until you know how the medication affects you.   You can go slower if you need to or increasing only one dose at a time.  Do not stop abruptly once at higher doses.  This medication must be tapered off.     2. Prescription for Robaxin today. Take 1-2 tablets as needed for muscle spasm. We will discuss effectiveness at next visit.    5.  Potential procedures: have trigger point injections as scheduled.     6.  Follow up with SUSAN Pierce CNP in 4 weeks.       ----------------------------------------------------------------  Nurse Triage line:  [phone]   Call this number  with any questions or concerns. You may leave a detailed message anytime. Calls are typically returned Monday through Friday between 8 AM and 4:30 PM. We usually get back to you within 2 business days depending on the issue/request.       Medication refills:    For non-narcotic medications, call your pharmacy directly to request a refill. The pharmacy will contact the [facility] for authorization. Please allow 3-4 days for these refills to be processed.   Scheduling number: [phone].  Call this number to schedule or change appointments.    We believe regular attendance is key to your success in our program.    Any time you are unable to keep your appointment we ask that you call us at least 24 hours in advance to let us know. This will allow us to offer the appointment time to another patient.

## 2018-02-06 NOTE — MR AVS SNAPSHOT
After Visit Summary   2/6/2018    Mary Lerma    MRN: [id number]           Patient Information     Date Of Birth          1985        Visit Information        Provider Department      2/6/2018 10:00 AM Zaida Day APRN CNP [facility]        Today's Diagnoses     Chronic bilateral thoracic back pain    -  1    Fibromyalgia        Claustrophobia          Care Instructions    Diagnosis reviewed, treatment option addressed, and risk/benifits discussed.  Self-care instructions given.  I am recommending a multidisciplinary treatment plan to help this patient better manage her pain.       1.  Pain Physical Therapy:  YES   Continue to see LOBO Villalta as recommended.    2.  Pain Psychologist to address relaxation, behavioral change, coping style, and other factors important to improvement.  YES  Continue to see Dr. Cade as recommended.    3.  Diagnostic Studies:  I ordered your thoracic MRI today. They will call you to schedule. Just in case: [phone]. Take one time dose of Valium 30-60 minutes prior to MRI. I will MyChart you with results.    4.  Medication Management:     1. Continue gabapentin increase as directed below.  Gabapentin 1 tab= 100mg    AM   PM   Bedtime  100mg (1 tab)  100mg (1 tab)  200mg (2 tabs). After 3-5 days, increase as tolerated   200mg (2 tabs)  100mg (1 tab)  200mg (2 tabs). After 3-5 days, increase as tolerated   200mg (2 tabs)  200mg (2 tabs)  200mg (2 tabs). After 3-5 days, increase as tolerated   200mg (2 tabs)  200mg (2 tabs)  300mg (3 tabs). After 3-5 days, increase as tolerated   300mg (3 tabs)  200mg (2 tabs)  300mg (3 tabs). After 3-5 days, increase as tolerated   300mg (3 tabs)  300mg (3 tabs)  300mg (3 tabs).     Call with any problems.  Caution for sedation.    Do not drive until you know how the medication affects you.   You can go slower if you need to or increasing only one dose at a time.  Do not stop abruptly once at higher  doses.  This medication must be tapered off.    5.  Potential procedures: have trigger point injections as scheduled.     6.  Follow up with SUSAN Pierce CNP in 4 weeks.       ----------------------------------------------------------------  Nurse Triage line:  [phone]   Call this number with any questions or concerns. You may leave a detailed message anytime. Calls are typically returned Monday through Friday between 8 AM and 4:30 PM. We usually get back to you within 2 business days depending on the issue/request.       Medication refills:    For non-narcotic medications, call your pharmacy directly to request a refill. The pharmacy will contact the [facility] for authorization. Please allow 3-4 days for these refills to be processed.   Scheduling number: [phone].  Call this number to schedule or change appointments.    We believe regular attendance is key to your success in our program.    Any time you are unable to keep your appointment we ask that you call us at least 24 hours in advance to let us know. This will allow us to offer the appointment time to another patient.               Follow-ups after your visit        Your next 10 appointments already scheduled     Feb 19, 2018  1:30 PM CST   PROCEDURE with Ike Silverio MD   [facility] ([facility])    [address] [address] [address]   [phone]              Future tests that were ordered for you today     Open Future Orders        Priority Expected Expires Ordered    MR Thoracic Spine w/o Contrast Routine  2/6/2019 2/6/2018            Who to contact     If you have questions or need follow up information about today's clinic visit or your schedule please contact [facility] directly at [phone].  Normal or non-critical lab and imaging results will be communicated to you by MyChart, letter or phone within 4 business days after the  clinic has received the results. If you do not hear from us within 7 days, please contact the clinic through [employer] Â® or phone. If you have a critical or abnormal lab result, we will notify you by phone as soon as possible.  Submit refill requests through [employer] Â® or call your pharmacy and they will forward the refill request to us. Please allow 3 business days for your refill to be completed.          Additional Information About Your Visit        [facility] [facility] Information     [employer] Â® gives you secure access to your electronic health record. If you see a primary care provider, you can also send messages to your care team and make appointments. If you have questions, please call your primary care clinic.  If you do not have a primary care provider, please call [phone] and they will assist you.        Care EveryWhere ID     This is your Care EveryWhere ID. This could be used by other organizations to access your [facility] medical records  [id number]        Your Vitals Were     Pulse Pulse Oximetry BMI (Body Mass Index)             89 97% 34.39 kg/m2          Blood Pressure from Last 3 Encounters:   02/06/18 112/71   01/10/18 122/79   11/27/17 122/66    Weight from Last 3 Encounters:   02/06/18 85.3 kg (188 lb)   01/10/18 84.4 kg (186 lb)   11/27/17 81.2 kg (179 lb)                 Today's Medication Changes          These changes are accurate as of 2/6/18 10:19 AM.  If you have any questions, ask your nurse or doctor.               Start taking these medicines.        Dose/Directions    diazepam 10 MG tablet   Commonly known as:  VALIUM   Used for:  Claustrophobia   Started by:  Zaida Day APRN CNP        Dose:  10 mg   Take 1 tablet (10 mg) by mouth once for 1 dose Take 30-60 minutes before MRI.  Do not operate a vehicle after taking this medication.   Quantity:  1 tablet   Refills:  0            Where to get your medicines      Some of these will need a paper prescription and others can be bought over the  counter.  Ask your nurse if you have questions.     Bring a paper prescription for each of these medications     diazepam 10 MG tablet                Primary Care Provider Office Phone # Fax #    Elena McdonoughEDENILSON [phone]       [facility] [address] [address] [facility] [address]        Equal Access to Services     ANDERSON DARNELL : Hadii aad ku hadasho Soomaali, waaxda luqadaha, qaybta kaalmada adeegyada, waxay jacobin hayaan adeamy khjaynick steward . So [facility] [phone].    ATENCIÓN: Si habla español, tiene a myers disposición servicios gratuitos de asistencia lingüística. Lulu al [phone].    We comply with applicable federal civil rights laws and [place] laws. We do not discriminate on the basis of race, color, national origin, age, disability, sex, sexual orientation, or gender identity.            Thank you!     Thank you for choosing [facility]  for your care. Our goal is always to provide you with excellent care. Hearing back from our patients is one way we can continue to improve our services. Please take a few minutes to complete the written survey that you may receive in the mail after your visit with us. Thank you!             Your Updated Medication List - Protect others around you: Learn how to safely use, store and throw away your medicines at www.disposemymeds.org.          This list is accurate as of 2/6/18 10:19 AM.  Always use your most recent med list.                   Brand Name Dispense Instructions for use Diagnosis    diazepam 10 MG tablet    VALIUM    1 tablet    Take 1 tablet (10 mg) by mouth once for 1 dose Take 30-60 minutes before MRI.  Do not operate a vehicle after taking this medication.    Claustrophobia       DULoxetine 30 MG EC capsule    CYMBALTA    60 capsule    Take 1 capsule (30 mg) by mouth 2 times daily    Fibromyalgia       gabapentin 100 MG capsule    NEURONTIN    150 capsule    Take 3 capsules (300 mg) by mouth 3 times daily     Fibromyalgia, Chronic bilateral thoracic back pain, Chronic bilateral low back pain with sciatica, sciatica laterality unspecified       medroxyPROGESTERone 150 MG/ML injection    DEPO-PROVERA    1 mL    Inject 1 mL (150 mg) into the muscle every 3 months    Encounter for initial prescription of injectable contraceptive       SUMAtriptan 100 MG tablet    IMITREX    18 tablet    Take 1 tablet (100 mg) by mouth at onset of headache for migraine May repeat in 2 hours. Max 2 tablets/24 hours.    Other migraine without status migrainosus, not intractable

## 2018-02-06 NOTE — NURSING NOTE
"Chief Complaint   Patient presents with     Pain       Initial /71  Pulse 89  Wt 85.3 kg (188 lb)  SpO2 97%  BMI 34.39 kg/m2 Estimated body mass index is 34.39 kg/(m^2) as calculated from the following:    Height as of 11/27/17: 1.575 m (5' 2\").    Weight as of this encounter: 85.3 kg (188 lb).      "

## 2018-02-08 ENCOUNTER — HOSPITAL ENCOUNTER (OUTPATIENT)
Dept: MRI IMAGING | Facility: CLINIC | Age: 33
Discharge: HOME | End: 2018-02-08
Attending: NURSE PRACTITIONER | Admitting: NURSE PRACTITIONER
Payer: COMMERCIAL

## 2018-02-08 DIAGNOSIS — G89.29 CHRONIC BILATERAL THORACIC BACK PAIN: ICD-10-CM

## 2018-02-08 DIAGNOSIS — M54.6 CHRONIC BILATERAL THORACIC BACK PAIN: ICD-10-CM

## 2018-02-08 PROCEDURE — 72146 MRI CHEST SPINE W/O DYE: CPT

## 2018-02-09 ENCOUNTER — TELEPHONE (OUTPATIENT)
Dept: PALLIATIVE MEDICINE | Facility: CLINIC | Age: 33
End: 2018-02-09

## 2018-02-09 NOTE — TELEPHONE ENCOUNTER
"Received call from patient's mother, Antoinette, who is requesting to speak to Lora Day. When asked what the call was in regards to the patient's mother stated \"I'm a nurse and I want to talk to her about her MRI\". She states she'll have her daughter call to give consent to speak with her. Phone #[phone] (Antoinette)      Kristyn Puri    [facility]    "

## 2018-02-09 NOTE — TELEPHONE ENCOUNTER
Called patient. LM that we received call from someone inquiring about recent results. Advised to call triage and LM with time to reach her at and what specific questions she may have.     Zainab Dorsey  RN-BSN Care Coordinator  [facility]

## 2018-02-13 NOTE — TELEPHONE ENCOUNTER
Called patient again and LM to call triage with time to discuss her questions.     Zainab Dorsey  RN-BSN Care Coordinator  [facility]

## 2018-02-14 ENCOUNTER — TRANSFERRED RECORDS (OUTPATIENT)
Dept: HEALTH INFORMATION MANAGEMENT | Facility: CLINIC | Age: 33
End: 2018-02-14

## 2018-02-19 ENCOUNTER — OFFICE VISIT (OUTPATIENT)
Dept: PALLIATIVE MEDICINE | Facility: CLINIC | Age: 33
End: 2018-02-19
Payer: COMMERCIAL

## 2018-02-19 VITALS
BODY MASS INDEX: 34.39 KG/M2 | DIASTOLIC BLOOD PRESSURE: 71 MMHG | OXYGEN SATURATION: 100 % | WEIGHT: 188 LBS | HEART RATE: 70 BPM | SYSTOLIC BLOOD PRESSURE: 118 MMHG

## 2018-02-19 DIAGNOSIS — M79.18 MYOFASCIAL MUSCLE PAIN: Primary | ICD-10-CM

## 2018-02-19 PROCEDURE — 20553 NJX 1/MLT TRIGGER POINTS 3/>: CPT | Performed by: PAIN MEDICINE

## 2018-02-19 ASSESSMENT — PAIN SCALES - GENERAL: PAINLEVEL: SEVERE PAIN (6)

## 2018-02-19 NOTE — TELEPHONE ENCOUNTER
No return call. Closing    Zainab Dorsey  RN-BSN Care Coordinator  [facility]

## 2018-02-19 NOTE — MR AVS SNAPSHOT
After Visit Summary   2/19/2018    Mary Lerma    MRN: [id number]           Patient Information     Date Of Birth          1985        Visit Information        Provider Department      2/19/2018 1:30 PM Ike Silverio MD [facility]        Today's Diagnoses     Myofascial muscle pain    -  1      Care Instructions    [facility]   Post Procedure Instructions    Today you had:  trigger point injections        Medications used:  lidocaine   bupivicaine   kenolog   dexamethasone          Go to the emergency room if you develop any shortness of breath    Monitor the injection sites for signs and symptoms of infection-fever, chills, redness, swelling, warmth, or drainage to areas.    You may have soreness at injection sites for up to 24 hours.    You may apply ice to the painful areas to help minimize the discomfort of the needle pokes.    Do not apply heat to sites for at least 12 hours.    You may use anti-inflammatory medications or Tylenol for pain control if necessary  Nurse line: [phone]  After hours provider line: [phone]  Appointment line: [phone]              Follow-ups after your visit        Who to contact     If you have questions or need follow up information about today's clinic visit or your schedule please contact [facility] directly at [phone].  Normal or non-critical lab and imaging results will be communicated to you by MyChart, letter or phone within 4 business days after the clinic has received the results. If you do not hear from us within 7 days, please contact the clinic through [employer] or phone. If you have a critical or abnormal lab result, we will notify you by phone as soon as possible.  Submit refill requests through [employer] or call your pharmacy and they will forward the refill request to us. Please allow 3 business days for your refill to be completed.          Additional Information  About Your Visit        [employer] [facility] Information     [employer] gives you secure access to your electronic health record. If you see a primary care provider, you can also send messages to your care team and make appointments. If you have questions, please call your primary care clinic.  If you do not have a primary care provider, please call [phone] and they will assist you.        Care EveryWhere ID     This is your Care EveryWhere ID. This could be used by other organizations to access your [facility] medical records  [id number]        Your Vitals Were     Pulse Pulse Oximetry BMI (Body Mass Index)             70 100% 34.39 kg/m2          Blood Pressure from Last 3 Encounters:   02/19/18 118/71   02/06/18 112/71   01/10/18 122/79    Weight from Last 3 Encounters:   02/19/18 85.3 kg (188 lb)   02/06/18 85.3 kg (188 lb)   01/10/18 84.4 kg (186 lb)              We Performed the Following     NO CHARGE LOS        Primary Care Provider Office Phone # Fax #    Elena Mcdonough [phone]       [facility] [address] [facility] [address]        Equal Access to Services     BIA DARNELL : Hadii aad ku hadasho Soomaali, waaxda luqadaha, qaybta kaalmada adeegyada, waxay ivette haymaria esthern yue yanez. So [facility] [phone].    ATENCIÓN: Si habla español, tiene a myers disposición servicios gratuitos de asistencia lingüística. Nimaame al [phone].    We comply with applicable federal civil rights laws and [place] laws. We do not discriminate on the basis of race, color, national origin, age, disability, sex, sexual orientation, or gender identity.            Thank you!     Thank you for choosing [facility]  for your care. Our goal is always to provide you with excellent care. Hearing back from our patients is one way we can continue to improve our services. Please take a few minutes to complete the written survey that you may receive in the mail after your visit with us.  Thank you!             Your Updated Medication List - Protect others around you: Learn how to safely use, store and throw away your medicines at www.disposemymeds.org.          This list is accurate as of 2/19/18  3:11 PM.  Always use your most recent med list.                   Brand Name Dispense Instructions for use Diagnosis    DULoxetine 30 MG EC capsule    CYMBALTA    60 capsule    Take 1 capsule (30 mg) by mouth 2 times daily    Fibromyalgia       gabapentin 100 MG capsule    NEURONTIN    150 capsule    Take 3 capsules (300 mg) by mouth 3 times daily    Fibromyalgia, Chronic bilateral thoracic back pain, Chronic bilateral low back pain with sciatica, sciatica laterality unspecified       medroxyPROGESTERone 150 MG/ML injection    DEPO-PROVERA    1 mL    Inject 1 mL (150 mg) into the muscle every 3 months    Encounter for initial prescription of injectable contraceptive       methocarbamol 500 MG tablet    ROBAXIN    75 tablet    Take 1-2 tablets (500-1,000 mg) by mouth 3 times daily as needed for muscle spasms    Muscle spasm       SUMAtriptan 100 MG tablet    IMITREX    18 tablet    Take 1 tablet (100 mg) by mouth at onset of headache for migraine May repeat in 2 hours. Max 2 tablets/24 hours.    Other migraine without status migrainosus, not intractable

## 2018-02-19 NOTE — PATIENT INSTRUCTIONS
[facility]   Post Procedure Instructions    Today you had:  trigger point injections        Medications used:  lidocaine   bupivicaine   kenolog   dexamethasone          Go to the emergency room if you develop any shortness of breath    Monitor the injection sites for signs and symptoms of infection-fever, chills, redness, swelling, warmth, or drainage to areas.    You may have soreness at injection sites for up to 24 hours.    You may apply ice to the painful areas to help minimize the discomfort of the needle pokes.    Do not apply heat to sites for at least 12 hours.    You may use anti-inflammatory medications or Tylenol for pain control if necessary  Nurse line: [phone]  After hours provider line: [phone]  Appointment line: [phone]

## 2018-02-26 PROBLEM — M54.50 LUMBAGO: Status: RESOLVED | Noted: 2017-12-12 | Resolved: 2018-02-26

## 2018-02-26 PROBLEM — M54.6 THORACIC BACK PAIN: Status: RESOLVED | Noted: 2017-12-12 | Resolved: 2018-02-26

## 2018-02-26 NOTE — PROGRESS NOTES
Subjective:  HPI  Oswestry Score: 38 %                 Objective:  System    Physical Exam    General     ROS    Assessment/Plan:    DISCHARGE REPORT    Progress reporting period is from 12/12/2017 to 1/4/2018.     SUBJECTIVE    Subjective: Patient states she has not had much relief of back pain yet. She reports going to pool therapy regularly and doing home stretches daily. She brought in kinesiotape for postural feedback and reports her  can help with that once she knows how to place it on her back   Current Pain level: 7/10   Initial Pain level: 7/10 (up to 10/10 at times)       OBJECTIVE    Objective: Placed kinesiotape in an X across upper back and vertically along lumbar paraspinals. Reviewed posture principles.                        Patient has failed to return to therapy so current objective findings are unknown.    ASSESSMENT/PLAN    STG/LTGs have been met or progress has been made towards goals:  None  Assessment of Progress: The patient's condition is unchanged.  Self Management Plans:  Patient has been instructed in a home treatment program.  Patient  has been instructed in self management of symptoms.    Mary continues to require the following intervention to meet STG and LTG's: PT intervention is no longer required to meet STG/LTG.    Recommendations:    This patient will be discharged from therapy and continue their home treatment program.    Please refer to the daily flowsheet for treatment today, total treatment time and time spent performing 1:1 timed codes.

## 2018-03-09 ENCOUNTER — MYC REFILL (OUTPATIENT)
Dept: PALLIATIVE MEDICINE | Facility: CLINIC | Age: 33
End: 2018-03-09

## 2018-03-09 ENCOUNTER — MYC REFILL (OUTPATIENT)
Dept: FAMILY MEDICINE | Facility: CLINIC | Age: 33
End: 2018-03-09

## 2018-03-09 DIAGNOSIS — M79.7 FIBROMYALGIA: ICD-10-CM

## 2018-03-09 DIAGNOSIS — M62.838 MUSCLE SPASM: ICD-10-CM

## 2018-03-09 RX ORDER — METHOCARBAMOL 500 MG/1
500-1000 TABLET, FILM COATED ORAL 3 TIMES DAILY PRN
Qty: 75 TABLET | Refills: 0 | Status: SHIPPED | OUTPATIENT
Start: 2018-03-09 | End: 2018-04-12

## 2018-03-09 NOTE — TELEPHONE ENCOUNTER
Please see [employer] msgs.  Ok to fill at higher dosing?  Or do you want to see pt?  Last OV 11/27/18.

## 2018-03-09 NOTE — TELEPHONE ENCOUNTER
Message from [employer]:  Original authorizing provider: EDENILSON Lopez would like a refill of the following medications:  DULoxetine (CYMBALTA) 30 MG EC capsule [Elena Mcdonough NP]    Preferred pharmacy: [facility] [address] [place] [id number] [facility] [address]    Comment:      Medication renewals requested in this message routed to other providers:  methocarbamol (ROBAXIN) 500 MG tablet [Zaida Day, APRN CNP]

## 2018-03-09 NOTE — TELEPHONE ENCOUNTER
Message from [employer]:  Original authorizing provider: SUSAN Hernandez CNP would like a refill of the following medications:  methocarbamol (ROBAXIN) 500 MG tablet [SUSAN Hernandez CNP]    Preferred pharmacy: [facility] [address] [place] [id number] [facility] [address]    Comment:      Medication renewals requested in this message routed to other providers:  DULoxetine (CYMBALTA) 30 MG EC capsule [Elena M Mcdonough, NP]

## 2018-03-09 NOTE — TELEPHONE ENCOUNTER
Pt. Is requesting medication     [facility] [address] [facility] [address] [facility] AT [address] [address] [facility] [address]  Phone: [phone] Fax: [phone]       requesting refill(s) for     methocarbamol (ROBAXIN) 500 MG tablet    Last refilled on 2/6/18    Pt last seen on 2/19/18  Next appt scheduled for none     Will facilitate refill.

## 2018-03-09 NOTE — TELEPHONE ENCOUNTER
Routing to MA pool to gather info    Janet Sorto   BSN-RN Care Coordinator  [facility]

## 2018-03-09 NOTE — TELEPHONE ENCOUNTER
Signed Prescriptions:                        Disp   Refills    methocarbamol (ROBAXIN) 500 MG tablet      75 tab*0        Sig: Take 1-2 tablets (500-1,000 mg) by mouth 3 times           daily as needed for muscle spasms  Authorizing Provider: DELFIN HARDING APRN [facility]

## 2018-03-12 RX ORDER — DULOXETIN HYDROCHLORIDE 60 MG/1
60 CAPSULE, DELAYED RELEASE ORAL DAILY
Qty: 30 CAPSULE | Refills: 3 | Status: SHIPPED | OUTPATIENT
Start: 2018-03-12 | End: 2018-04-11

## 2018-03-12 NOTE — TELEPHONE ENCOUNTER
According to the guidelines for fibromyalgia, dosing should be 60 mg once daily. We started at 30 but now can go with once a day dosing. Prescription sent.

## 2018-04-11 ENCOUNTER — OFFICE VISIT (OUTPATIENT)
Dept: FAMILY MEDICINE | Facility: CLINIC | Age: 33
End: 2018-04-11
Payer: COMMERCIAL

## 2018-04-11 VITALS
HEIGHT: 62 IN | BODY MASS INDEX: 36.62 KG/M2 | DIASTOLIC BLOOD PRESSURE: 62 MMHG | WEIGHT: 199 LBS | OXYGEN SATURATION: 98 % | HEART RATE: 77 BPM | RESPIRATION RATE: 16 BRPM | SYSTOLIC BLOOD PRESSURE: 100 MMHG | TEMPERATURE: 99.1 F

## 2018-04-11 DIAGNOSIS — M79.7 FIBROMYALGIA: ICD-10-CM

## 2018-04-11 DIAGNOSIS — E66.3 OVERWEIGHT: Primary | ICD-10-CM

## 2018-04-11 PROCEDURE — 99213 OFFICE O/P EST LOW 20 MIN: CPT | Performed by: NURSE PRACTITIONER

## 2018-04-11 RX ORDER — DULOXETIN HYDROCHLORIDE 60 MG/1
60 CAPSULE, DELAYED RELEASE ORAL DAILY
Qty: 90 CAPSULE | Refills: 1 | Status: SHIPPED | OUTPATIENT
Start: 2018-04-11

## 2018-04-11 ASSESSMENT — PATIENT HEALTH QUESTIONNAIRE - PHQ9: 5. POOR APPETITE OR OVEREATING: SEVERAL DAYS

## 2018-04-11 ASSESSMENT — ANXIETY QUESTIONNAIRES
6. BECOMING EASILY ANNOYED OR IRRITABLE: SEVERAL DAYS
3. WORRYING TOO MUCH ABOUT DIFFERENT THINGS: SEVERAL DAYS
2. NOT BEING ABLE TO STOP OR CONTROL WORRYING: NOT AT ALL
1. FEELING NERVOUS, ANXIOUS, OR ON EDGE: NOT AT ALL
5. BEING SO RESTLESS THAT IT IS HARD TO SIT STILL: NOT AT ALL
7. FEELING AFRAID AS IF SOMETHING AWFUL MIGHT HAPPEN: NOT AT ALL
IF YOU CHECKED OFF ANY PROBLEMS ON THIS QUESTIONNAIRE, HOW DIFFICULT HAVE THESE PROBLEMS MADE IT FOR YOU TO DO YOUR WORK, TAKE CARE OF THINGS AT HOME, OR GET ALONG WITH OTHER PEOPLE: NOT DIFFICULT AT ALL
GAD7 TOTAL SCORE: 3

## 2018-04-11 NOTE — PROGRESS NOTES
SUBJECTIVE:   Mary Lerma is a 32 year old female who presents to clinic today for the following health issues:      Medication Followup and refill    Taking Medication as prescribed: yes    Side Effects:  None    Medication Helping Symptoms:  yes   Here for a refill on Cymbalta. Taking 60 mg daily and feels it is helpful. Is concerned about her increase in weight and has stopped taking the gabapentin. History of gastric sleeve and is frustrated with her weight gain. Not exercising due to lethargy. Back pain is worse. Interested in going to a nutritional  to see if she can get back on track. Is wondering if she needs to go back to a liquid diet.        Problem list and histories reviewed & adjusted, as indicated.  Additional history: none    Patient Active Problem List   Diagnosis     Overweight     Acute thoracic back pain, unspecified back pain laterality     Encounter for surveillance of injectable contraceptive     Intractable chronic migraine without aura     Chronic pain     Fibromyalgia     Past Surgical History:   Procedure Laterality Date     CHOLECYSTECTOMY       GI SURGERY  2015    gastric sleeve     MYRINGOTOMY, INSERT TUBE BILATERAL, COMBINED Bilateral 7/10/2017    Procedure: COMBINED MYRINGOTOMY, INSERT TUBE BILATERAL;  Bilateral Pressure Equalization Tubes placement and Bilateral Tonsillectomy;  Surgeon: Jacques Lawson MD;  Location:  OR     ORTHOPEDIC SURGERY       TONSILLECTOMY Bilateral 7/10/2017    Procedure: TONSILLECTOMY;;  Surgeon: Jacques Lawson MD;  Location:  OR       Social History   Substance Use Topics     Smoking status: Former Smoker     Types: Cigarettes     Quit date: 2/9/2017     Smokeless tobacco: Never Used     Alcohol use No     Family History   Problem Relation Age of Onset     Family History Negative Mother      DIABETES Father      Hypertension Father            Reviewed and updated as needed this visit by clinical staff  Tobacco  Allergies  Meds  Problems   "Med Hx  Surg Hx  Fam Hx  Soc Hx        Reviewed and updated as needed this visit by Provider  Allergies  Meds  Problems  Med Hx  Surg Hx  Fam Hx         ROS:  Constitutional, HEENT, cardiovascular, pulmonary, gi and gu systems are negative, except as otherwise noted.    OBJECTIVE:     /62 (BP Location: Right arm, Patient Position: Chair, Cuff Size: Adult Regular)  Pulse 77  Temp 99.1  F (37.3  C) (Oral)  Resp 16  Ht 5' 2\" (1.575 m)  Wt 199 lb (90.3 kg)  SpO2 98%  Breastfeeding? No  BMI 36.4 kg/m2  Body mass index is 36.4 kg/(m^2).  GENERAL: healthy, alert and no distress  RESP: lungs clear to auscultation - no rales, rhonchi or wheezes  CV: regular rate and rhythm, normal S1 S2, no S3 or S4, no murmur, click or rub, no peripheral edema and peripheral pulses strong  MS: extremities normal- no gross deformities noted  PSYCH: mentation appears normal and fatigued        ASSESSMENT/PLAN:             1. Fibromyalgia  Stable. Refilled for 6 months.   - DULoxetine (CYMBALTA) 60 MG EC capsule; Take 1 capsule (60 mg) by mouth daily  Dispense: 90 capsule; Refill: 1    2. Overweight  Referral to bariatric for help with diet. Explained the importance of staying active and eating a healthy diet.   - BARIATRIC ADULT REFERRAL    Follow up in 6 months.     Elena Mcdonough NP  [facility]    "

## 2018-04-11 NOTE — NURSING NOTE
"Chief Complaint   Patient presents with     Refill Request       Initial /62 (BP Location: Right arm, Patient Position: Chair, Cuff Size: Adult Regular)  Pulse 77  Temp 99.1  F (37.3  C) (Oral)  Resp 16  Ht 5' 2\" (1.575 m)  Wt 199 lb (90.3 kg)  SpO2 98%  Breastfeeding? No  BMI 36.4 kg/m2 Estimated body mass index is 36.4 kg/(m^2) as calculated from the following:    Height as of this encounter: 5' 2\" (1.575 m).    Weight as of this encounter: 199 lb (90.3 kg).  Medication Reconciliation: complete     Wilber Hyatt CMA      "

## 2018-04-12 ENCOUNTER — MYC REFILL (OUTPATIENT)
Dept: PALLIATIVE MEDICINE | Facility: CLINIC | Age: 33
End: 2018-04-12

## 2018-04-12 DIAGNOSIS — M62.838 MUSCLE SPASM: ICD-10-CM

## 2018-04-12 ASSESSMENT — ANXIETY QUESTIONNAIRES: GAD7 TOTAL SCORE: 3

## 2018-04-12 ASSESSMENT — PATIENT HEALTH QUESTIONNAIRE - PHQ9: SUM OF ALL RESPONSES TO PHQ QUESTIONS 1-9: 11

## 2018-04-13 RX ORDER — METHOCARBAMOL 500 MG/1
500-1000 TABLET, FILM COATED ORAL 3 TIMES DAILY PRN
Qty: 75 TABLET | Refills: 0 | Status: SHIPPED | OUTPATIENT
Start: 2018-04-13 | End: 2018-05-22

## 2018-04-13 NOTE — TELEPHONE ENCOUNTER
Message from MyChart:  Original authorizing provider: SUSAN Hernandez CNP would like a refill of the following medications:  methocarbamol (ROBAXIN) 500 MG tablet [SUSAN Hernandez CNP]    Preferred pharmacy: [facility] [address] [place] [address] [id number]    Comment:

## 2018-04-13 NOTE — TELEPHONE ENCOUNTER
Signed Prescriptions:                        Disp   Refills    methocarbamol (ROBAXIN) 500 MG tablet      75 tab*0        Sig: Take 1-2 tablets (500-1,000 mg) by mouth 3 times           daily as needed for muscle spasms  Authorizing Provider: SHUN BRADLEY, RN CNP, FNP  [facility]    Signing for colleague who is out of the clinic today.

## 2018-04-13 NOTE — TELEPHONE ENCOUNTER
Script prepped for Robaxin, Last picked up 3/12/18      Janet GONZALEZN-RN Care Coordinator  [facility]

## 2018-04-13 NOTE — TELEPHONE ENCOUNTER
My chart sent to patient:    Stanislav Hernandez,  A new prescription for you Robaxin has been sent to your pharmacy. Let us know if you have any questions.     Take care,    Janet GONZALEZN-RN Care Coordinator  [facility] [place]

## 2018-04-13 NOTE — TELEPHONE ENCOUNTER
Message from MyChart:  Original authorizing provider: SUSAN Hernandez CNP would like a refill of the following medications:  methocarbamol (ROBAXIN) 500 MG tablet [SUSAN Hernandez CNP]    Preferred pharmacy: [facility] [id number] [place] [address] [id number]    Comment:      Janet ACEVEDO-RN Care Coordinator  [facility] [place]

## 2018-04-18 ENCOUNTER — RADIANT APPOINTMENT (OUTPATIENT)
Dept: GENERAL RADIOLOGY | Facility: CLINIC | Age: 33
End: 2018-04-18
Attending: FAMILY MEDICINE
Payer: COMMERCIAL

## 2018-04-18 ENCOUNTER — OFFICE VISIT (OUTPATIENT)
Dept: FAMILY MEDICINE | Facility: CLINIC | Age: 33
End: 2018-04-18
Payer: COMMERCIAL

## 2018-04-18 VITALS
HEART RATE: 91 BPM | DIASTOLIC BLOOD PRESSURE: 68 MMHG | BODY MASS INDEX: 36.62 KG/M2 | TEMPERATURE: 98 F | HEIGHT: 62 IN | SYSTOLIC BLOOD PRESSURE: 124 MMHG | WEIGHT: 199 LBS | OXYGEN SATURATION: 99 %

## 2018-04-18 DIAGNOSIS — M79.601 PAIN OF RIGHT UPPER EXTREMITY: ICD-10-CM
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
## 2018-04-18 DIAGNOSIS — Z30.42 ENCOUNTER FOR SURVEILLANCE OF INJECTABLE CONTRACEPTIVE: ICD-10-CM

## 2018-04-18 DIAGNOSIS — M79.601 PAIN OF RIGHT UPPER EXTREMITY: Primary | ICD-10-CM

## 2018-04-18 DIAGNOSIS — Z30.42 ON DEPO-PROVERA FOR CONTRACEPTION: ICD-10-CM

## 2018-04-18 PROCEDURE — 99214 OFFICE O/P EST MOD 30 MIN: CPT | Mod: 25 | Performed by: FAMILY MEDICINE

## 2018-04-18 PROCEDURE — 73130 X-RAY EXAM OF HAND: CPT | Mod: RT

## 2018-04-18 PROCEDURE — 96372 THER/PROPH/DIAG INJ SC/IM: CPT | Performed by: FAMILY MEDICINE

## 2018-04-18 ASSESSMENT — ENCOUNTER SYMPTOMS
CARDIOVASCULAR NEGATIVE: 1
APPETITE CHANGE: 1
ACTIVITY CHANGE: 1
ENDOCRINE NEGATIVE: 1
RESPIRATORY NEGATIVE: 1
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
## 2018-04-20 ENCOUNTER — OFFICE VISIT (OUTPATIENT)
Dept: FAMILY MEDICINE | Facility: CLINIC | Age: 33
End: 2018-04-20
Payer: COMMERCIAL

## 2018-04-20 VITALS
HEIGHT: 62 IN | WEIGHT: 199 LBS | BODY MASS INDEX: 36.62 KG/M2 | HEART RATE: 83 BPM | OXYGEN SATURATION: 99 % | SYSTOLIC BLOOD PRESSURE: 108 MMHG | TEMPERATURE: 98.3 F | RESPIRATION RATE: 16 BRPM | DIASTOLIC BLOOD PRESSURE: 64 MMHG

## 2018-04-20 DIAGNOSIS — M79.641 PAIN OF RIGHT HAND: Primary | ICD-10-CM

## 2018-04-20 PROCEDURE — 99213 OFFICE O/P EST LOW 20 MIN: CPT | Performed by: NURSE PRACTITIONER

## 2018-04-20 RX ORDER — OXYCODONE AND ACETAMINOPHEN 5; 325 MG/1; MG/1
1 TABLET ORAL 2 TIMES DAILY PRN
Qty: 6 TABLET | Refills: 0 | Status: SHIPPED | OUTPATIENT
Start: 2018-04-20 | End: 2018-05-22
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
## 2018-04-20 NOTE — NURSING NOTE
"Chief Complaint   Patient presents with     Musculoskeletal Problem     f/u on hand injury       Initial /64 (BP Location: Right arm, Patient Position: Chair, Cuff Size: Adult Regular)  Pulse 83  Temp 98.3  F (36.8  C) (Oral)  Resp 16  Ht 5' 2\" (1.575 m)  Wt 199 lb (90.3 kg)  SpO2 99%  BMI 36.4 kg/m2 Estimated body mass index is 36.4 kg/(m^2) as calculated from the following:    Height as of this encounter: 5' 2\" (1.575 m).    Weight as of this encounter: 199 lb (90.3 kg).  Medication Reconciliation: complete     Wilber Hyatt CMA      "

## 2018-04-20 NOTE — MR AVS SNAPSHOT
After Visit Summary   4/20/2018    Mary Lerma    MRN: [id number]           Patient Information     Date Of Birth          1985        Visit Information        Provider Department      4/20/2018 8:45 AM Elena Mcdonough NP [facility]        Today's Diagnoses     Pain of right hand    -  1       Follow-ups after your visit        Additional Services     ORTHOPEDICS ADULT REFERRAL       Your provider has referred you to: [facility] [phone]   [email or web address]    Please be aware that coverage of these services is subject to the terms and limitations of your health insurance plan.  Call member services at your health plan with any benefit or coverage questions.      Please bring the following to your appointment:    >>   Any x-rays, CTs or MRIs which have been performed.  Contact the facility where they were done to arrange for  prior to your scheduled appointment.    >>   List of current medications   >>   This referral request   >>   Any documents/labs given to you for this referral                  Follow-up notes from your care team     Return if symptoms worsen or fail to improve.      Who to contact     If you have questions or need follow up information about today's clinic visit or your schedule please contact [facility] directly at [phone].  Normal or non-critical lab and imaging results will be communicated to you by MyChart, letter or phone within 4 business days after the clinic has received the results. If you do not hear from us within 7 days, please contact the clinic through MyChart or phone. If you have a critical or abnormal lab result, we will notify you by phone as soon as possible.  Submit refill requests through [employer] or call your pharmacy and they will forward the refill request to us. Please allow 3 business days for your refill to be completed.          Additional  "Information About Your Visit        [employer] Information     [employer] gives you secure access to your electronic health record. If you see a primary care provider, you can also send messages to your care team and make appointments. If you have questions, please call your primary care clinic.  If you do not have a primary care provider, please call [phone] and they will assist you.        Care EveryWhere ID     This is your Care EveryWhere ID. This could be used by other organizations to access your [place] medical records  [id number]        Your Vitals Were     Pulse Temperature Respirations Height Pulse Oximetry BMI (Body Mass Index)    83 98.3  F (36.8  C) (Oral) 16 5' 2\" (1.575 m) 99% 36.4 kg/m2       Blood Pressure from Last 3 Encounters:   04/20/18 108/64   04/18/18 124/68   04/11/18 100/62    Weight from Last 3 Encounters:   04/20/18 199 lb (90.3 kg)   04/18/18 199 lb (90.3 kg)   04/11/18 199 lb (90.3 kg)              We Performed the Following     ORTHOPEDICS ADULT REFERRAL          Today's Medication Changes          These changes are accurate as of 4/20/18  9:47 AM.  If you have any questions, ask your nurse or doctor.               Start taking these medicines.        Dose/Directions    oxyCODONE-acetaminophen 5-325 MG per tablet   Commonly known as:  PERCOCET   Used for:  Pain of right hand   Started by:  Elena Mcdonough NP        Dose:  1 tablet   Take 1 tablet by mouth 2 times daily as needed for severe pain maximum 2 tablets per day   Quantity:  6 tablet   Refills:  0            Where to get your medicines      Some of these will need a paper prescription and others can be bought over the counter.  Ask your nurse if you have questions.     Bring a paper prescription for each of these medications     oxyCODONE-acetaminophen 5-325 MG per tablet               Information about OPIOIDS     PRESCRIPTION OPIOIDS: WHAT YOU NEED TO KNOW   You have a prescription for an opioid (narcotic) pain medicine. " Opioids can cause addiction. If you have a history of chemical dependency of any type, you are at a higher risk of becoming addicted to opioids. Only take this medicine after all other options have been tried. Take it for as short a time and as few doses as possible.     Do not:    Drive. If you drive while taking these medicines, you could be arrested for driving under the influence (DUI).    Operate heavy machinery    Do any other dangerous activities while taking these medicines.     Drink any alcohol while taking these medicines.      Take with any other medicines that contain acetaminophen. Read all labels carefully. Look for the word  acetaminophen  or  Tylenol.  Ask your pharmacist if you have questions or are unsure.    Store your pills in a secure place, locked if possible. We will not replace any lost or stolen medicine. If you don t finish your medicine, please throw away (dispose) as directed by your pharmacist. The [place] [employer] has more information about safe disposal: https://www.pca.[facility]./living-green/managing-unwanted-medications    All opioids tend to cause constipation. Drink plenty of water and eat foods that have a lot of fiber, such as fruits, vegetables, prune juice, apple juice and high-fiber cereal. Take a laxative (Miralax, milk of magnesia, Colace, Senna) if you don t move your bowels at least every other day.          Primary Care Provider Office Phone # Fax #    Elena Mcdonough [phone]       [address] [address]        Equal Access to Services     ANDERSON DARNELL : Hadii aad ku hadasho Sogenoali, waaxda luqadaha, qaybta kaalmada adeamyyahéctor major. So [facility] [phone].    ATENCIÓN: Si habla español, tiene a myers disposición servicios gratuitos de asistencia lingüística. Llame al [phone].    We comply with applicable federal civil rights laws and [place] laws. We do  not discriminate on the basis of race, color, national origin, age, disability, sex, sexual orientation, or gender identity.            Thank you!     Thank you for choosing [facility]  for your care. Our goal is always to provide you with excellent care. Hearing back from our patients is one way we can continue to improve our services. Please take a few minutes to complete the written survey that you may receive in the mail after your visit with us. Thank you!             Your Updated Medication List - Protect others around you: Learn how to safely use, store and throw away your medicines at www.disposemymeds.org.          This list is accurate as of 4/20/18  9:47 AM.  Always use your most recent med list.                   Brand Name Dispense Instructions for use Diagnosis    DULoxetine 60 MG EC capsule    CYMBALTA    90 capsule    Take 1 capsule (60 mg) by mouth daily    Fibromyalgia       medroxyPROGESTERone 150 MG/ML injection    DEPO-PROVERA    1 mL    Inject 1 mL (150 mg) into the muscle every 3 months    Encounter for initial prescription of injectable contraceptive       methocarbamol 500 MG tablet    ROBAXIN    75 tablet    Take 1-2 tablets (500-1,000 mg) by mouth 3 times daily as needed for muscle spasms    Muscle spasm       nabumetone 750 MG tablet    RELAFEN    30 tablet    Take 1 tablet (750 mg) by mouth 2 times daily as needed for moderate pain    Pain of right upper extremity       order for DME     1 Units    Equipment being ordered: wrist splint    Pain of right upper extremity       oxyCODONE-acetaminophen 5-325 MG per tablet    PERCOCET    6 tablet    Take 1 tablet by mouth 2 times daily as needed for severe pain maximum 2 tablets per day    Pain of right hand       SUMAtriptan 100 MG tablet    IMITREX    18 tablet    Take 1 tablet (100 mg) by mouth at onset of headache for migraine May repeat in 2 hours. Max 2 tablets/24 hours.    Other migraine without status migrainosus, not  intractable

## 2018-04-21 ASSESSMENT — PATIENT HEALTH QUESTIONNAIRE - PHQ9: SUM OF ALL RESPONSES TO PHQ QUESTIONS 1-9: 12

## 2018-05-22 ENCOUNTER — OFFICE VISIT (OUTPATIENT)
Dept: FAMILY MEDICINE | Facility: CLINIC | Age: 33
End: 2018-05-22
Payer: COMMERCIAL

## 2018-05-22 VITALS
SYSTOLIC BLOOD PRESSURE: 110 MMHG | TEMPERATURE: 98.6 F | WEIGHT: 202 LBS | HEART RATE: 83 BPM | RESPIRATION RATE: 16 BRPM | HEIGHT: 62 IN | DIASTOLIC BLOOD PRESSURE: 66 MMHG | BODY MASS INDEX: 37.17 KG/M2 | OXYGEN SATURATION: 98 %

## 2018-05-22 DIAGNOSIS — R42 VERTIGO: Primary | ICD-10-CM

## 2018-05-22 PROCEDURE — 99213 OFFICE O/P EST LOW 20 MIN: CPT | Performed by: NURSE PRACTITIONER

## 2018-05-22 RX ORDER — MECLIZINE HYDROCHLORIDE 25 MG/1
25 TABLET ORAL EVERY 6 HOURS PRN
Qty: 30 TABLET | Refills: 1 | Status: SHIPPED | OUTPATIENT
Start: 2018-05-22

## 2018-05-22 NOTE — PROGRESS NOTES
SUBJECTIVE:   Mary Lerma is a 32 year old female who presents to clinic today for the following health issues:      Dizziness      Duration: x 2-3 days    Description   Feeling faint:  YES  Feeling like the surroundings are moving: YES  Loss of consciousness or falls: no but feels like it    Intensity:  moderate    Accompanying signs and symptoms:   Nausea/vomitting: nausea  Palpitations: YES  Weakness in arms or legs: no   Vision or speech changes: no   Ringing in ears (Tinnitus): no   Hearing loss related to dizziness: no   Other (fevers/chills/sweating/dyspnea): just HA    History (similar episodes/head trauma/previous evaluation/recent bleeding): None    Precipitating or alleviating factors (new meds/chemicals): None  Worse with activity/head movement: YES- does not do that    Therapies tried and outcome: None    Here with complaints of dizziness for the past two days. Feels like the room is spinning when she moves her head or changes positions. Went to work but was not feeling well.       Problem list and histories reviewed & adjusted, as indicated.  Additional history: none    Patient Active Problem List   Diagnosis     Overweight     Acute thoracic back pain, unspecified back pain laterality     Encounter for surveillance of injectable contraceptive     Intractable chronic migraine without aura     Chronic pain     Fibromyalgia     Past Surgical History:   Procedure Laterality Date     CHOLECYSTECTOMY       GI SURGERY  2015    gastric sleeve     MYRINGOTOMY, INSERT TUBE BILATERAL, COMBINED Bilateral 7/10/2017    Procedure: COMBINED MYRINGOTOMY, INSERT TUBE BILATERAL;  Bilateral Pressure Equalization Tubes placement and Bilateral Tonsillectomy;  Surgeon: Jacques Lawson MD;  Location:  OR     ORTHOPEDIC SURGERY       TONSILLECTOMY Bilateral 7/10/2017    Procedure: TONSILLECTOMY;;  Surgeon: Jacques Lawson MD;  Location:  OR       Social History   Substance Use Topics     Smoking status: Former Smoker  "    Types: Cigarettes     Quit date: 2/9/2017     Smokeless tobacco: Never Used     Alcohol use No     Family History   Problem Relation Age of Onset     Family History Negative Mother      DIABETES Father      Hypertension Father            Reviewed and updated as needed this visit by clinical staff  Allergies  Meds  Problems  Med Hx  Surg Hx  Fam Hx       Reviewed and updated as needed this visit by Provider  Allergies  Meds  Problems  Med Hx  Surg Hx  Fam Hx         ROS:  Constitutional, HEENT, cardiovascular, pulmonary, gi and gu systems are negative, except as otherwise noted.    OBJECTIVE:     /66 (BP Location: Right arm, Patient Position: Chair, Cuff Size: Adult Regular)  Pulse 83  Temp 98.6  F (37  C) (Oral)  Resp 16  Ht 5' 2\" (1.575 m)  Wt 202 lb (91.6 kg)  SpO2 98%  Breastfeeding? No  BMI 36.95 kg/m2  Body mass index is 36.95 kg/(m^2).  GENERAL: healthy, alert and no distress  EYES: Eyes grossly normal to inspection, PERRL and nystagmus   HENT: normal cephalic/atraumatic, both ears: PE tube well placed, nose and mouth without ulcers or lesions, oropharynx clear and oral mucous membranes moist  NECK: no adenopathy, no asymmetry, masses, or scars and thyroid normal to palpation  RESP: lungs clear to auscultation - no rales, rhonchi or wheezes  CV: regular rate and rhythm, normal S1 S2, no S3 or S4, no murmur, click or rub, no peripheral edema and peripheral pulses strong  PSYCH: mentation appears normal, affect normal/bright        ASSESSMENT/PLAN:             1. Vertigo  Will start on meclizine prn. Referral to balance/vestibular center if symptoms do not improve. Advised to not drive when having symptoms and to change positions slowly.   - meclizine (ANTIVERT) 25 MG tablet; Take 1 tablet (25 mg) by mouth every 6 hours as needed for dizziness  Dispense: 30 tablet; Refill: 1  - PHYSICAL THERAPY REFERRAL        Elena Mcdonough NP  [facility]    "

## 2018-05-22 NOTE — NURSING NOTE
"Chief Complaint   Patient presents with     Dizziness       Initial /66 (BP Location: Right arm, Patient Position: Chair, Cuff Size: Adult Regular)  Pulse 83  Temp 98.6  F (37  C) (Oral)  Resp 16  Ht 5' 2\" (1.575 m)  Wt 202 lb (91.6 kg)  SpO2 98%  Breastfeeding? No  BMI 36.95 kg/m2 Estimated body mass index is 36.95 kg/(m^2) as calculated from the following:    Height as of this encounter: 5' 2\" (1.575 m).    Weight as of this encounter: 202 lb (91.6 kg).  Medication Reconciliation: complete      Health Maintenance addressed:  NONE    Wilber Hyatt CMA  .      "

## 2018-05-22 NOTE — LETTER
[facility]  [address] [place] [address] [phone]          May 22, 2018    RE:  Mary Lerma                                                                                                                                                       [address] [facility] [address]  [facility] [id number]            To whom it may concern:    Mary Lerma is under my professional care for vertigo. She has been started on medications to help with symptoms. She will be going to the balance/vestibular center if symptoms do not improve.     Sincerely,        Elena MENDEZNP

## 2018-05-22 NOTE — MR AVS SNAPSHOT
"              After Visit Summary   5/22/2018    Mary Lerma    MRN: [id number]           Patient Information     Date Of Birth          1985        Visit Information        Provider Department      5/22/2018 2:45 PM Elena Mcdonough NP [facility]        Today's Diagnoses     Vertigo    -  1       Follow-ups after your visit        Additional Services     PHYSICAL THERAPY REFERRAL       *This therapy referral will be filtered to a centralized scheduling office at [facility] and the patient will receive a call to schedule an appointment at a [facility] location most convenient for them. *     [facility] provides Physical Therapy evaluation and treatment and many specialty services across the [facility] system.  If requesting a specialty program, please choose from the list below.    If you have not heard from the scheduling office within 2 business days, please call [phone] for all locations, with the exception of [place], please call [phone] and [facility], please call [phone]  Treatment: Evaluation & Treatment  Special Instructions/Modalities:   Special Programs: Balance/Vestibular    Please be aware that coverage of these services is subject to the terms and limitations of your health insurance plan.  Call member services at your health plan with any benefit or coverage questions.      **Note to Provider:  If you are referring outside of [facility] for the therapy appointment, please list the name of the location in the \"special instructions\" above, print the referral and give to the patient to schedule the appointment.                  Who to contact     If you have questions or need follow up information about today's clinic visit or your schedule please contact [facility] directly at [phone].  Normal or non-critical lab and imaging results will be communicated to you by MyChart, letter or phone within 4 business " "days after the clinic has received the results. If you do not hear from us within 7 days, please contact the clinic through [employer] or phone. If you have a critical or abnormal lab result, we will notify you by phone as soon as possible.  Submit refill requests through [employer] or call your pharmacy and they will forward the refill request to us. Please allow 3 business days for your refill to be completed.          Additional Information About Your Visit        [employer] Information     [employer] gives you secure access to your electronic health record. If you see a primary care provider, you can also send messages to your care team and make appointments. If you have questions, please call your primary care clinic.  If you do not have a primary care provider, please call [phone] and they will assist you.        Care EveryWhere ID     This is your Care EveryWhere ID. This could be used by other organizations to access your [place] medical records  [id number]        Your Vitals Were     Pulse Temperature Respirations Height Pulse Oximetry Breastfeeding?    83 98.6  F (37  C) (Oral) 16 5' 2\" (1.575 m) 98% No    BMI (Body Mass Index)                   36.95 kg/m2            Blood Pressure from Last 3 Encounters:   05/22/18 110/66   04/20/18 108/64   04/18/18 124/68    Weight from Last 3 Encounters:   05/22/18 202 lb (91.6 kg)   04/20/18 199 lb (90.3 kg)   04/18/18 199 lb (90.3 kg)              We Performed the Following     PHYSICAL THERAPY REFERRAL          Today's Medication Changes          These changes are accurate as of 5/22/18  2:55 PM.  If you have any questions, ask your nurse or doctor.               Start taking these medicines.        Dose/Directions    meclizine 25 MG tablet   Commonly known as:  ANTIVERT   Used for:  Vertigo        Dose:  25 mg   Take 1 tablet (25 mg) by mouth every 6 hours as needed for dizziness   Quantity:  30 tablet   Refills:  1            Where to get your medicines      These " medications were sent to [facility] [address] [facility] [address] [address] [facility], [facility] [address]     Phone:  [phone]     meclizine 25 MG tablet                Primary Care Provider Office Phone # Fax #    Elena Mcdonough, EDENILSON [phone]       [facility] [address]  [place] [address]        Equal Access to Services     ANDERSON DARNELL : Hadii aad ku hadasho Soomaali, waaxda luqadaha, qaybta kaalmada adeegyada, waxay idiin hayaan adeeg kharash lajoshua . So [facility] [phone].    ATENCIÓN: Si habla español, tiene a myers disposición servicios gratuitos de asistencia lingüística. [facility] [phone].    We comply with applicable federal civil rights laws and [place] laws. We do not discriminate on the basis of race, color, national origin, age, disability, sex, sexual orientation, or gender identity.            Thank you!     Thank you for choosing [facility]  for your care. Our goal is always to provide you with excellent care. Hearing back from our patients is one way we can continue to improve our services. Please take a few minutes to complete the written survey that you may receive in the mail after your visit with us. Thank you!             Your Updated Medication List - Protect others around you: Learn how to safely use, store and throw away your medicines at www.disposemymeds.org.          This list is accurate as of 5/22/18  2:55 PM.  Always use your most recent med list.                   Brand Name Dispense Instructions for use Diagnosis    DULoxetine 60 MG EC capsule    CYMBALTA    90 capsule    Take 1 capsule (60 mg) by mouth daily    Fibromyalgia       meclizine 25 MG tablet    ANTIVERT    30 tablet    Take 1 tablet (25 mg) by mouth every 6 hours as needed for dizziness    Vertigo       medroxyPROGESTERone 150 MG/ML injection    DEPO-PROVERA    1 mL    Inject 1 mL (150 mg) into the muscle every 3  months    Encounter for initial prescription of injectable contraceptive       nabumetone 750 MG tablet    RELAFEN    30 tablet    Take 1 tablet (750 mg) by mouth 2 times daily as needed for moderate pain    Pain of right upper extremity       order for DME     1 Units    Equipment being ordered: wrist splint    Pain of right upper extremity       SUMAtriptan 100 MG tablet    IMITREX    18 tablet    Take 1 tablet (100 mg) by mouth at onset of headache for migraine May repeat in 2 hours. Max 2 tablets/24 hours.    Other migraine without status migrainosus, not intractable

## 2018-07-10 ENCOUNTER — ALLIED HEALTH/NURSE VISIT (OUTPATIENT)
Dept: NURSING | Facility: CLINIC | Age: 33
End: 2018-07-10
Payer: COMMERCIAL

## 2018-07-10 DIAGNOSIS — Z30.9 CONTRACEPTIVE MANAGEMENT: Primary | ICD-10-CM

## 2018-07-10 PROCEDURE — 96372 THER/PROPH/DIAG INJ SC/IM: CPT

## 2019-11-06 ENCOUNTER — HEALTH MAINTENANCE LETTER (OUTPATIENT)
Age: 34
End: 2019-11-06

## 2020-11-29 ENCOUNTER — HEALTH MAINTENANCE LETTER (OUTPATIENT)
Age: 35
End: 2020-11-29

## 2021-09-19 ENCOUNTER — HEALTH MAINTENANCE LETTER (OUTPATIENT)
Age: 36
End: 2021-09-19

## 2022-01-09 ENCOUNTER — HEALTH MAINTENANCE LETTER (OUTPATIENT)
Age: 37
End: 2022-01-09

## 2022-10-20 ENCOUNTER — TRANSFERRED RECORDS (OUTPATIENT)
Dept: HEALTH INFORMATION MANAGEMENT | Facility: CLINIC | Age: 37
End: 2022-10-20

## 2022-11-08 ENCOUNTER — MEDICAL CORRESPONDENCE (OUTPATIENT)
Dept: HEALTH INFORMATION MANAGEMENT | Facility: CLINIC | Age: 37
End: 2022-11-08

## 2022-11-09 ENCOUNTER — TRANSCRIBE ORDERS (OUTPATIENT)
Dept: OTHER | Age: 37
End: 2022-11-09

## 2022-11-09 DIAGNOSIS — H69.90 ETD (EUSTACHIAN TUBE DYSFUNCTION): Primary | ICD-10-CM

## 2023-04-16 ENCOUNTER — HEALTH MAINTENANCE LETTER (OUTPATIENT)
Age: 38
End: 2023-04-16

## (undated) DEVICE — ESU SUCTION CAUTERY 10FR FOOT CONTROL E2505-10FR

## (undated) DEVICE — GLOVE PROTEXIS POWDER FREE SMT 7.5  2D72PT75X

## (undated) DEVICE — ESU GROUND PAD ADULT W/CORD E7507

## (undated) DEVICE — SPECIMEN CONTAINER W/10% BUFFERED FORMALIN 120ML 591201

## (undated) DEVICE — ESU CORD BIPOLAR GREEN 10-4000

## (undated) DEVICE — LINEN TOWEL PACK X5 5464

## (undated) DEVICE — ESU PENCIL W/COATED BLADE E2450H

## (undated) DEVICE — ANTIFOG SOLUTION W/FOAM PAD 31142527

## (undated) DEVICE — SOL NACL 0.9% IRRIG 1000ML BOTTLE 2F7124

## (undated) DEVICE — TUBING SUCTION 12"X1/4" N612

## (undated) DEVICE — SPONGE TONSIL W/STRING MED 23275-680

## (undated) DEVICE — TUBE EAR GOODE T SIL 10-16010

## (undated) DEVICE — Device

## (undated) DEVICE — SUCTION MANIFOLD NEPTUNE 2 SYS 4 PORT 0702-020-000

## (undated) DEVICE — ESU ELEC BLADE 2.75" COATED/INSULATED E1455

## (undated) DEVICE — SUCTION TIP YANKAUER STR K87

## (undated) RX ORDER — ACETAMINOPHEN 325 MG/1
TABLET ORAL
Status: DISPENSED
Start: 2017-07-10

## (undated) RX ORDER — PROPOFOL 10 MG/ML
INJECTION, EMULSION INTRAVENOUS
Status: DISPENSED
Start: 2017-07-10

## (undated) RX ORDER — DEXAMETHASONE SODIUM PHOSPHATE 10 MG/ML
INJECTION, SOLUTION INTRAMUSCULAR; INTRAVENOUS
Status: DISPENSED
Start: 2017-07-10

## (undated) RX ORDER — CEFAZOLIN SODIUM 1 G/3ML
INJECTION, POWDER, FOR SOLUTION INTRAMUSCULAR; INTRAVENOUS
Status: DISPENSED
Start: 2017-07-10

## (undated) RX ORDER — HYDROCODONE BITARTRATE AND ACETAMINOPHEN 7.5; 325 MG/15ML; MG/15ML
SOLUTION ORAL
Status: DISPENSED
Start: 2017-07-10

## (undated) RX ORDER — FENTANYL CITRATE 50 UG/ML
INJECTION, SOLUTION INTRAMUSCULAR; INTRAVENOUS
Status: DISPENSED
Start: 2017-07-10

## (undated) RX ORDER — ONDANSETRON 2 MG/ML
INJECTION INTRAMUSCULAR; INTRAVENOUS
Status: DISPENSED
Start: 2017-07-10